# Patient Record
Sex: FEMALE | Race: BLACK OR AFRICAN AMERICAN | Employment: FULL TIME | URBAN - METROPOLITAN AREA
[De-identification: names, ages, dates, MRNs, and addresses within clinical notes are randomized per-mention and may not be internally consistent; named-entity substitution may affect disease eponyms.]

---

## 2017-02-15 ENCOUNTER — OFFICE VISIT (OUTPATIENT)
Dept: ENDOCRINOLOGY | Age: 37
End: 2017-02-15

## 2017-02-15 VITALS
HEIGHT: 60 IN | SYSTOLIC BLOOD PRESSURE: 125 MMHG | WEIGHT: 195.6 LBS | HEART RATE: 82 BPM | BODY MASS INDEX: 38.4 KG/M2 | DIASTOLIC BLOOD PRESSURE: 76 MMHG

## 2017-02-15 DIAGNOSIS — E05.90 HYPERTHYROIDISM: Primary | ICD-10-CM

## 2017-02-15 DIAGNOSIS — R00.0 TACHYCARDIA: ICD-10-CM

## 2017-02-15 RX ORDER — PROPRANOLOL HYDROCHLORIDE 20 MG/1
10 TABLET ORAL 2 TIMES DAILY
Qty: 60 TAB | Refills: 3
Start: 2017-02-15 | End: 2017-02-16 | Stop reason: SDUPTHER

## 2017-02-15 NOTE — PROGRESS NOTES
Chief Complaint   Patient presents with    Thyroid Problem     pcp and pharmacy verified   Records since last visit reviewed  History of Present Illness: Andrew Argueta is a 39 y.o. female here for follow up of hyperthyroidism. Pt was originally diagnosed with hyperthyroidism in 2010. She was started on MMI back in 2010 but she was not always able to afford the medication so she has not been on MMI consistently. Dr. Agus Shelby restarted her MMI at 20mg daily in March 2016. In February her TRAb was elevated at 21.29. At her last visit in October 2016 her TSH was 0.776 with FT4 of 0.95 and TT3 of 106 on MMI 10mg daily. She had been feeling sluggish and sleepy, so we decreased her MMI to 5mg daily. After 6 weeks her TSH was 0.192 with FT4 of 1.27 and TT3 of 128. Pt is taking the MMI 5mg daily and Propranolol 20mg BID. She is not having as much of the sluggish and sleepy feelings, she is concerned that she has gained about 7 pounds since October 2016. She denies issues of dysphagia, dysphonia or chocking. She denies CP, SOB, palpitations, tremors, diarrhea. She denies heat or cold intolerance. Pt is still having issues of dry eyes, she is using saline eye drops as needed. Mostly at night. She denies erythema or eye pain. She saw her eye doctor who gave her glasses and eye drops to help. Current Outpatient Prescriptions   Medication Sig    propranolol (INDERAL) 20 mg tablet Take 0.5 Tabs by mouth two (2) times a day.  methIMAzole (TAPAZOLE) 5 mg tablet TAKE ONE TABLET BY MOUTH ONCE DAILY    doxycycline (MONODOX) 100 mg capsule Take 100 mg by mouth two (2) times a day.  busPIRone (BUSPAR) 15 mg tablet Take 0.5 Tabs by mouth two (2) times a day. May increase to 1 tab 2 times daily in 5-7 days if needed.  albuterol (PROVENTIL HFA, VENTOLIN HFA, PROAIR HFA) 90 mcg/actuation inhaler Take 1-2 Puffs by inhalation every six (6) hours as needed for Wheezing.      No current facility-administered medications for this visit. No Known Allergies  Review of Systems:  - Cardiovascular: no chest pain  - Neurological: no tremors  - Integumentary: skin is normal    Physical Examination:  Blood pressure 125/76, pulse 82, height 5' (1.524 m), weight 195 lb 9.6 oz (88.7 kg). - General: pleasant, no distress, good eye contact, + exopthalmous  - Neck: minimal thyromegaly or thyroid bruits  - Cardiovascular: regular, normal rate, nl s1 and s2, no m/r/g   - Integumentary: skin is normal, no edema  - Neurological: reflexes 2+ at biceps, no tremors  - Psychiatric: normal mood and affect    Data Reviewed:   Component      Latest Ref Rng 10/11/2016 10/11/2016 10/11/2016           9:12 AM  9:12 AM  9:12 AM   TSH      0.450 - 4.500 uIU/mL  0.776    T4, Free      0.82 - 1.77 ng/dL   0.95   T3, total      71 - 180 ng/dL 106         Assessment/Plan:   1) Hyperthyroidism > Pt is clinically euthyroid today. He symptoms of edema have improved, she is not having as much sluggishness or fatigue and the cramping has improved. Will check TFTs today as adjust her dose as needed. Pt instructed to cut her Propranolol in half and take 10mg BID. Pt voices understanding and agreement with the plan. RTC 3 months    Patient Instructions   1) Cut your Propranolol in half, and take 1/2 pill twice daily. Follow-up Disposition:  Return in about 3 months (around 5/15/2017).     Copy sent to:  Dr. Ortiz Soni

## 2017-02-15 NOTE — MR AVS SNAPSHOT
Visit Information Date & Time Provider Department Dept. Phone Encounter #  
 2/15/2017 10:50 AM Shadia Hebert MD [facility] [phone] [id number] Upcoming Health Maintenance Date Due DTaP/Tdap/Td series (1 - Tdap) 5/8/2001 PAP AKA CERVICAL CYTOLOGY 5/8/2001 INFLUENZA AGE 9 TO ADULT 8/1/2016 Allergies as of 2/15/2017  Review Complete On: 2/15/2017 By: Shadia Hebert MD  
 No Known Allergies Current Immunizations  Never Reviewed No immunizations on file. Not reviewed this visit Vitals BP Pulse Height(growth percentile) Weight(growth percentile) BMI OB Status 125/76 82 5' (1.524 m) 195 lb 9.6 oz (88.7 kg) 38.2 kg/m2 Having regular periods Smoking Status Never Smoker Vitals History BMI and BSA Data Body Mass Index Body Surface Area  
 38.2 kg/m 2 1.94 m 2 Preferred Pharmacy Pharmacy Name Phone [facility] [address] [place] [address] [phone] Your Updated Medication List  
  
   
This list is accurate as of: 2/15/17 11:14 AM.  Always use your most recent med list.  
  
  
  
  
 albuterol 90 mcg/actuation inhaler Commonly known as:  PROVENTIL HFA, VENTOLIN HFA, PROAIR HFA Take 1-2 Puffs by inhalation every six (6) hours as needed for Wheezing. busPIRone 15 mg tablet Commonly known as:  BUSPAR Take 0.5 Tabs by mouth two (2) times a day. May increase to 1 tab 2 times daily in 5-7 days if needed. doxycycline 100 mg capsule Commonly known as:  Donnella Span Take 100 mg by mouth two (2) times a day. methIMAzole 5 mg tablet Commonly known as:  TAPAZOLE  
TAKE ONE TABLET BY MOUTH ONCE DAILY propranolol 20 mg tablet Commonly known as:  INDERAL Take 1 Tab by mouth two (2) times a day. Patient Instructions 1) Cut your Propranolol in half, and take 1/2 pill twice daily. Introducing [facility]! [facility] introduces [employer] patient portal. Now you can access parts of your medical record, email your doctor's office, and request medication refills online. 1. In your internet browser, go to [email or web address] 2. Click on the First Time User? Click Here link in the Sign In box. You will see the New Member Sign Up page. 3. Enter your [employer] Access Code exactly as it appears below. You will not need to use this code after youve completed the sign-up process. If you do not sign up before the expiration date, you must request a new code. · [employer] Access Code: [email or web address] Expires: 5/16/2017 10:50 AM 
 
4. Enter the last four digits of your Social Security Number (xxxx) and Date of Birth (mm/dd/yyyy) as indicated and click Submit. You will be taken to the next sign-up page. 5. Create a [employer] ID. This will be your [employer] login ID and cannot be changed, so think of one that is secure and easy to remember. 6. Create a [employer] password. You can change your password at any time. 7. Enter your Password Reset Question and Answer. This can be used at a later time if you forget your password. 8. Enter your e-mail address. You will receive e-mail notification when new information is available in [address] 9. Click Sign Up. You can now view and download portions of your medical record. 10. Click the Download Summary menu link to download a portable copy of your medical information. If you have questions, please visit the Frequently Asked Questions section of the [employer] website. Remember, [employer] is NOT to be used for urgent needs. For medical emergencies, dial 911. Now available from your iPhone and Android! Please provide this summary of care documentation to your next provider. Your primary care clinician is listed as [address]. If you have any questions after today's visit, please call [phone].

## 2017-02-16 DIAGNOSIS — R00.0 TACHYCARDIA: ICD-10-CM

## 2017-02-16 DIAGNOSIS — E05.90 HYPERTHYROIDISM: ICD-10-CM

## 2017-02-16 LAB
T3 SERPL-MCNC: 118 NG/DL (ref 71–180)
T4 FREE SERPL-MCNC: 1.32 NG/DL (ref 0.82–1.77)
TSH SERPL DL<=0.005 MIU/L-ACNC: 0.21 UIU/ML (ref 0.45–4.5)

## 2017-02-16 RX ORDER — PROPRANOLOL HYDROCHLORIDE 10 MG/1
10 TABLET ORAL 2 TIMES DAILY
Qty: 60 TAB | Refills: 3 | Status: SHIPPED | OUTPATIENT
Start: 2017-02-16 | End: 2018-01-18 | Stop reason: SDUPTHER

## 2017-02-16 NOTE — PROGRESS NOTES
Spoke with pt regarding her thyroid labs. Her T4/T4 are in a good range and her TSH is stable. Pt is clinically euthyroid. Will continue the MMI 5mg daily. Pt to decrease her Propranolol to 10mg BID. Pt voices understanding and agreement with the plan.

## 2017-03-19 ENCOUNTER — HOSPITAL ENCOUNTER (EMERGENCY)
Age: 37
Discharge: HOME | End: 2017-03-19
Attending: EMERGENCY MEDICINE | Admitting: EMERGENCY MEDICINE
Payer: MEDICAID

## 2017-03-19 VITALS
WEIGHT: 196 LBS | HEART RATE: 87 BPM | TEMPERATURE: 98.6 F | HEIGHT: 60 IN | DIASTOLIC BLOOD PRESSURE: 89 MMHG | SYSTOLIC BLOOD PRESSURE: 129 MMHG | OXYGEN SATURATION: 98 % | BODY MASS INDEX: 38.48 KG/M2 | RESPIRATION RATE: 16 BRPM

## 2017-03-19 DIAGNOSIS — R09.81 NASAL SINUS CONGESTION: Primary | ICD-10-CM

## 2017-03-19 PROCEDURE — 99282 EMERGENCY DEPT VISIT SF MDM: CPT

## 2017-03-19 RX ORDER — FLUTICASONE PROPIONATE 50 MCG
2 SPRAY, SUSPENSION (ML) NASAL DAILY
Qty: 1 BOTTLE | Refills: 0 | Status: SHIPPED | OUTPATIENT
Start: 2017-03-19 | End: 2019-06-25

## 2017-03-19 RX ORDER — FLUTICASONE PROPIONATE 50 MCG
2 SPRAY, SUSPENSION (ML) NASAL DAILY
Qty: 1 BOTTLE | Refills: 0 | Status: SHIPPED | OUTPATIENT
Start: 2017-03-19 | End: 2017-03-19

## 2017-03-19 RX ORDER — CETIRIZINE HYDROCHLORIDE, PSEUDOEPHEDRINE HYDROCHLORIDE 5; 120 MG/1; MG/1
1 TABLET, FILM COATED, EXTENDED RELEASE ORAL 2 TIMES DAILY
Qty: 20 TAB | Refills: 0 | Status: SHIPPED | OUTPATIENT
Start: 2017-03-19 | End: 2017-03-19

## 2017-03-19 RX ORDER — CETIRIZINE HYDROCHLORIDE, PSEUDOEPHEDRINE HYDROCHLORIDE 5; 120 MG/1; MG/1
1 TABLET, FILM COATED, EXTENDED RELEASE ORAL 2 TIMES DAILY
Qty: 20 TAB | Refills: 0 | Status: SHIPPED | OUTPATIENT
Start: 2017-03-19 | End: 2019-06-25

## 2017-03-19 NOTE — ED PROVIDER NOTES
HPI Comments: Viri Hall is a 39 y.o. female who presents home to the ED with a c/o congestion. Pt states that she has had a lot of sinus congestion for the last couple weeks. She has been taking mucinex with minimal to no relief. She states that she has been spitting up yellow mucous, has head pressure, and has a sore throat. She denies any ear ache. She specifically denies any fevers, chills, nausea, vomiting, chest pain, shortness of breath, headache, rash, diarrhea, sweating or weight loss. PCP: None  PMHx significant for: Past Medical History:  No date: Anxiety      Comment: History of panic attacks. No date: Asthma  No date: Endocrine disease      Comment: hyperthyroidism    PSHx significant for: Past Surgical History:  2008: HX  SECTION    Social Hx: Tobacco: None EtOH: Social Illicit drug use: none. She is a collection caller for work    There are no further complaints or symptoms at this time. )    Note written by Lawanda Martino, as dictated by Chaz SALMERON 9:39 AM      The history is provided by the patient. Past Medical History:   Diagnosis Date    Anxiety     History of panic attacks.  Asthma     Endocrine disease     hyperthyroidism       Past Surgical History:   Procedure Laterality Date    HX  SECTION           Family History:   Problem Relation Age of Onset    Heart Attack Mother     Diabetes Father     Diabetes Sister     Thyroid Disease Sister     No Known Problems Brother     Stroke Paternal Grandmother     Diabetes Paternal Grandmother     Thyroid Disease Paternal Aunt     Cancer Neg Hx        Social History     Social History    Marital status: SINGLE     Spouse name: N/A    Number of children: N/A    Years of education: N/A     Occupational History    Not on file.      Social History Main Topics    Smoking status: Never Smoker    Smokeless tobacco: Never Used    Alcohol use 0.0 oz/week     0 Standard drinks or equivalent per week      Comment: socially    Drug use: No    Sexual activity: Not Currently     Other Topics Concern    Not on file     Social History Narrative         ALLERGIES: Review of patient's allergies indicates no known allergies. Review of Systems   HENT: Positive for congestion, sinus pressure and sore throat. Negative for ear pain. All other systems reviewed and are negative. Vitals:    03/19/17 0933   BP: 129/89   Pulse: 87   Resp: 16   Temp: 98.6 °F (37 °C)   SpO2: 98%   Weight: 88.9 kg (196 lb)   Height: 5' (1.524 m)            Physical Exam   Constitutional: She is oriented to person, place, and time. She appears well-developed and well-nourished. No distress. HENT:   Head: Normocephalic and atraumatic. Right Ear: External ear normal.   Left Ear: External ear normal.   Erythematous boggy nares with clear rhinorrhea + PND   Neck: Neck supple. Cardiovascular: Normal rate, regular rhythm, normal heart sounds and intact distal pulses. Exam reveals no gallop and no friction rub. No murmur heard. Pulmonary/Chest: Effort normal and breath sounds normal. No stridor. No respiratory distress. She has no wheezes. She has no rales. She exhibits no tenderness. Abdominal: Soft. Bowel sounds are normal. She exhibits no distension and no mass. There is no tenderness. There is no rebound and no guarding. Musculoskeletal: Normal range of motion. She exhibits no edema, tenderness or deformity. Neurological: She is alert and oriented to person, place, and time. No cranial nerve deficit. Coordination normal.   Skin: No rash noted. No erythema. No pallor. Psychiatric: She has a normal mood and affect. Her behavior is normal.   Nursing note and vitals reviewed.        MDM  Number of Diagnoses or Management Options  Nasal sinus congestion:      Amount and/or Complexity of Data Reviewed  Review and summarize past medical records: yes    Patient Progress  Patient progress: stable    ED Course Procedures    9:45 AM  Discussed pt, sx, hx and current findings with Dr Scout De Anda. He is in agreement with plan  Domenico Adams. DANILO Downey      LABORATORY TESTS:  No results found for this or any previous visit (from the past 12 hour(s)). IMAGING RESULTS:  No orders to display       MEDICATIONS GIVEN:  Medications - No data to display    IMPRESSION:  1. Nasal sinus congestion        PLAN:  1. Discharge Medication List as of 3/19/2017  9:47 AM      START taking these medications    Details   fluticasone (FLONASE) 50 mcg/actuation nasal spray 2 Sprays by Both Nostrils route daily. , Normal, Disp-1 Bottle, R-0      cetirizine-psuedoePHEDrine (ZYRTEC-D) 5-120 mg per tablet Take 1 Tab by mouth two (2) times a day., Normal, Disp-20 Tab, R-0         CONTINUE these medications which have NOT CHANGED    Details   propranolol (INDERAL) 10 mg tablet Take 1 Tab by mouth two (2) times a day., Normal, Disp-60 Tab, R-3      methIMAzole (TAPAZOLE) 5 mg tablet TAKE ONE TABLET BY MOUTH ONCE DAILY, Normal, Disp-30 Tab, R-3      busPIRone (BUSPAR) 15 mg tablet Take 0.5 Tabs by mouth two (2) times a day.  May increase to 1 tab 2 times daily in 5-7 days if needed., Normal, Disp-60 Tab, R-1      albuterol (PROVENTIL HFA, VENTOLIN HFA, PROAIR HFA) 90 mcg/actuation inhaler Take 1-2 Puffs by inhalation every six (6) hours as needed for Wheezing., Print, Disp-1 Inhaler, R-0         STOP taking these medications       doxycycline (MONODOX) 100 mg capsule Comments:   Reason for Stoppin.   Follow-up Information     Follow up With Details Comments [address], [place] Schedule an appointment as soon as possible for a visit 2-4 days for recheck [address] and [facility] [address]  [address] Schedule an appointment as soon as possible for a visit 2-4 days for recheck [phone] Return to ED if worse     9:45 AM  Pt has been reexamined. Pt has no new complaints, changes or physical findings. Care plan outlined and precautions discussed. All available results were reviewed with pt. All medications were reviewed with pt. All of pt's questions and concerns were addressed. Pt agrees to F/U as instructed and agrees to return to ED upon further deterioration. Pt is ready to go home.   Tulio Larsen PA-C

## 2017-03-19 NOTE — DISCHARGE INSTRUCTIONS
Sinusitis: Care Instructions  Your Care Instructions    Sinusitis is an infection of the lining of the sinus cavities in your head. Sinusitis often follows a cold. It causes pain and pressure in your head and face. In most cases, sinusitis gets better on its own in 1 to 2 weeks. But some mild symptoms may last for several weeks. Sometimes antibiotics are needed. Follow-up care is a key part of your treatment and safety. Be sure to make and go to all appointments, and call your doctor if you are having problems. It's also a good idea to know your test results and keep a list of the medicines you take. How can you care for yourself at home? · Take an over-the-counter pain medicine, such as acetaminophen (Tylenol), ibuprofen (Advil, Motrin), or naproxen (Aleve). Read and follow all instructions on the label. · If the doctor prescribed antibiotics, take them as directed. Do not stop taking them just because you feel better. You need to take the full course of antibiotics. · Be careful when taking over-the-counter cold or flu medicines and Tylenol at the same time. Many of these medicines have acetaminophen, which is Tylenol. Read the labels to make sure that you are not taking more than the recommended dose. Too much acetaminophen (Tylenol) can be harmful. · Breathe warm, moist air from a steamy shower, a hot bath, or a sink filled with hot water. Avoid cold, dry air. Using a humidifier in your home may help. Follow the directions for cleaning the machine. · Use saline (saltwater) nasal washes to help keep your nasal passages open and wash out mucus and bacteria. You can buy saline nose drops at a grocery store or drugstore. Or you can make your own at home by adding 1 teaspoon of salt and 1 teaspoon of baking soda to 2 cups of distilled water. If you make your own, fill a bulb syringe with the solution, insert the tip into your nostril, and squeeze gently. Zabrina Jennings your nose.   · Put a hot, wet towel or a warm gel pack on your face 3 or 4 times a day for 5 to 10 minutes each time. · Try a decongestant nasal spray like oxymetazoline (Afrin). Do not use it for more than 3 days in a row. Using it for more than 3 days can make your congestion worse. When should you call for help? Call your doctor now or seek immediate medical care if:  · You have new or worse swelling or redness in your face or around your eyes. · You have a new or higher fever. Watch closely for changes in your health, and be sure to contact your doctor if:  · You have new or worse facial pain. · The mucus from your nose becomes thicker (like pus) or has new blood in it. · You are not getting better as expected. Where can you learn more? Go to http://darek-esther.info/. Enter Z791 in the search box to learn more about \"Sinusitis: Care Instructions. \"  Current as of: July 29, 2016  Content Version: 11.1  © 4855-2149 [employer]. Care instructions adapted under license by [employer] (which disclaims liability or warranty for this information). If you have questions about a medical condition or this instruction, always ask your healthcare professional. [place] [address] any warranty or liability for your use of this information. Saline Nasal Washes: Care Instructions  Your Care Instructions  Saline nasal washes help keep the nasal passages open by washing out thick or dried mucus. This simple remedy can help relieve symptoms of allergies, sinusitis, and colds. It also can make the nose feel more comfortable by keeping the mucous membranes moist. You may notice a little burning sensation in your nose the first few times you use the solution, but this usually gets better in a few days. Follow-up care is a key part of your treatment and safety. Be sure to make and go to all appointments, and call your doctor if you are having problems.  It's also a good idea to know your test results and keep a list of the medicines you take. How can you care for yourself at home? · You can buy premixed saline solution in a squeeze bottle or other sinus rinse products at a drugstore. Read and follow the instructions on the label. · You also can make your own saline solution by adding 1 teaspoon of salt and 1 teaspoon of baking soda to 2 cups of distilled water. · If you use a homemade solution, pour a small amount into a clean bowl. Using a rubber bulb syringe, squeeze the syringe and place the tip in the salt water. Pull a small amount of the salt water into the syringe by relaxing your hand. · Sit down with your head tilted slightly back. Do not lie down. Put the tip of the bulb syringe or the squeeze bottle a little way into one of your nostrils. Gently drip or squirt a few drops into the nostril. Repeat with the other nostril. Some sneezing and gagging are normal at first.  · Gently blow your nose. · Wipe the syringe or bottle tip clean after each use. · Repeat this 2 or 3 times a day. · Use nasal washes gently if you have nosebleeds often. When should you call for help? Watch closely for changes in your health, and be sure to contact your doctor if:  · You often get nosebleeds. · You have problems doing the nasal washes. Where can you learn more? Go to http://darek-esther.info/. Enter 441 [id number] in the search box to learn more about \"Saline Nasal Washes: Care Instructions. \"  Current as of: July 29, 2016  Content Version: 11.1  © 6696-5955 [employer]. Care instructions adapted under license by [employer] (which disclaims liability or warranty for this information). If you have questions about a medical condition or this instruction, always ask your healthcare professional. [address] any warranty or liability for your use of this information. We hope that we have addressed all of your medical concerns.  The examination and treatment you received in the Emergency Department were for an emergent problem and were not intended as complete care. It is important that you follow up with your healthcare provider(s) for ongoing care. If your symptoms worsen or do not improve as expected, and you are unable to reach your usual health care provider(s), you should return to the Emergency Department. Today's healthcare is undergoing tremendous change, and patient satisfaction surveys are one of the many tools to assess the quality of medical care. You may receive a survey from the [employer] regarding your experience in the Emergency Department. I hope that your experience has been completely positive, particularly the medical care that I provided. As such, please participate in the survey; anything less than excellent does not meet my expectations or intentions. [address] [facility] and [address] [facility] participate in nationally recognized quality of care measures. If your blood pressure is greater than 120/80, as reported below, we urge that you seek medical care to address the potential of high blood pressure, commonly known as hypertension. Hypertension can be hereditary or can be caused by certain medical conditions, pain, stress, or \"white coat syndrome. \"       Please make an appointment with your health care provider(s) for follow up of your Emergency Department visit. VITALS:   Patient Vitals for the past 8 hrs:   Temp Pulse Resp BP SpO2   03/19/17 0933 98.6 °F (37 °C) 87 16 129/89 98 %          Thank you for allowing us to provide you with medical care today. We realize that you have many choices for your emergency care needs. Please choose us in the future for any continued health care needs. [employer]  [place], [address] [facility] [address]   Office: [phone]

## 2017-03-19 NOTE — ED TRIAGE NOTES
Pt reports nasal congestion and sinus pain for the past 2 weeks. Pt ha been taking Mucinex OTC with minimal relief.

## 2017-05-22 ENCOUNTER — OFFICE VISIT (OUTPATIENT)
Dept: ENDOCRINOLOGY | Age: 37
End: 2017-05-22

## 2017-05-22 VITALS
SYSTOLIC BLOOD PRESSURE: 118 MMHG | HEIGHT: 60 IN | BODY MASS INDEX: 38.56 KG/M2 | HEART RATE: 95 BPM | DIASTOLIC BLOOD PRESSURE: 79 MMHG | WEIGHT: 196.4 LBS

## 2017-05-22 DIAGNOSIS — E05.90 HYPERTHYROIDISM: Primary | ICD-10-CM

## 2017-05-22 NOTE — PATIENT INSTRUCTIONS
Radioactive Iodine: What to Expect at Home  Your Recovery  Radioactive iodine is absorbed and concentrated by the thyroid gland. You get it in liquid or pill form. The radiation will pass out of your body through your urine within days. Until that time, you will give off radiation in your sweat, your saliva, your urine, and anything else that comes out of your body. It is important to avoid exposing other people to the radioactivity from your body. Your doctor will give you more written instructions. Follow these carefully. The instructions will tell you how far to stay away from people and how long you need to follow the precautions listed below. They will list other ways to keep other people safe. They will also tell you when it will be safe to go out, go to work, and do other activities. How can you care for yourself at home? General recommendations  · For a period of time, you will need to keep your distance from other people, especially young children and pregnant women. · Avoid close contact, kissing, and sexual activity. You may need to sleep in a separate bed from your partner. · Keep the toilet very clean. Men should urinate sitting down to avoid splashing. Flush the toilet 2 or 3 times after each use. Wash your hands well with soap and lots of water each time you use the toilet. · Rinse the bathroom sink and tub well after you use them. · Use separate towels, washcloths, and sheets. Wash these and your personal clothing by themselves. Don't wash them with other people's laundry. · You may want to use a special plastic trash bag for all your trash, such as bandages, paper or plastic dishes, menstrual pads, tissues, or paper towels. Talk to your treatment facility to see if they will handle the disposal. Or after 80 days, this bag can be thrown out with your other trash. · Wash your dishes in a  or by hand.  If you use disposable dishes, they must be thrown away in the special plastic trash bag. · Don't cook for other people. If you must cook, use plastic gloves. Then throw them away in the special plastic trash bag. Don't share cups, dishes, or utensils. Pregnancy and children  · Your doctor will tell you when it is safe to have sex and become pregnant. · You should not breastfeed your baby after you have been treated with radioactive iodine. Ask your doctor when it's safe to breastfeed. Travel  · Don't take public transportation. If you are able, it's best to drive yourself. · It is important to prepare for any problems you may have at airport security. People who have had radioactive iodine treatment can set off the radiation detection machines in airports for a week to 10 days. Check with local authorities about any steps or permission you may need to travel. · If you plan to travel on the interstate, you may set off radiation detectors. Most police and transportation workers are aware of medical radiation, but it may help to carry some paperwork from your doctor. Follow-up care is a key part of your treatment and safety. Be sure to make and go to all appointments, and call your doctor if you are having problems. It's also a good idea to know your test results and keep a list of the medicines you take. When should you call for help? Watch closely for any changes in your health, and be sure to contact your doctor if:  · You have a sore throat. · You vomit. · You have diarrhea. Where can you learn more? Go to http://darek-esther.info/. Enter C257 in the search box to learn more about \"Radioactive Iodine: What to Expect at Home. \"  Current as of: July 28, 2016  Content Version: 11.2  © 4009-2878 [employer]. Care instructions adapted under license by [employer] (which disclaims liability or warranty for this information).  If you have questions about a medical condition or this instruction, always ask your healthcare professional. [facility] disclaims any warranty or liability for your use of this information. Thyroidectomy: Before Your Surgery  What is a thyroidectomy? A thyroidectomy is surgery to take out your thyroid gland. This gland is shaped like a butterfly. It lies across the windpipe (trachea). The gland makes hormones that control how your body makes and uses energy (metabolism). A doctor removes the gland when it gets too big, does not work right, or has a tumor. Most tumors that grow in this gland are benign. This means they are not cancer. The doctor will take out the thyroid through a cut (incision) in the front of your neck. You will likely have a tube, called a drain, in your neck to let fluid out of the cut. The drain is most often taken out before you go home. You may go home on the same day. Or you may stay one or more nights in the hospital. You may return to work or your normal routine in 1 to 2 weeks. This depends on whether you need more treatment and how you feel. It may also depend on the kind of work you do. Your doctor will check your incision in about a week. You may need to take thyroid medicine. If you have thyroid cancer, you may need to have radioactive iodine therapy. Your doctor will talk to you about what happens next. Follow-up care is a key part of your treatment and safety. Be sure to make and go to all appointments, and call your doctor if you are having problems. It's also a good idea to know your test results and keep a list of the medicines you take. What happens before surgery? Surgery can be stressful. This information will help you understand what you can expect. And it will help you safely prepare for surgery. Preparing for surgery  · Understand exactly what surgery is planned, along with the risks, benefits, and other options. · Tell your doctors ALL the medicines, vitamins, supplements, and herbal remedies you take.  Some of these can increase the risk of bleeding or interact with anesthesia. · If you take blood thinners, such as warfarin (Coumadin), clopidogrel (Plavix), or aspirin, be sure to talk to your doctor. He or she will tell you if you should stop taking these medicines before your surgery. Make sure that you understand exactly what your doctor wants you to do. · Your doctor will tell you which medicines to take or stop before your surgery. You may need to stop taking certain medicines a week or more before surgery. So talk to your doctor as soon as you can. · If you have an advance directive, let your doctor know. It may include a living will and a durable power of  for health care. Bring a copy to the hospital. If you don't have one, you may want to prepare one. It lets your doctor and loved ones know your health care wishes. Doctors suggest that everyone prepare these papers before any type of surgery or procedure. What happens on the day of surgery? · Follow the instructions exactly about when to stop eating and drinking. If you don't, your surgery may be canceled. If your doctor told you to take your medicines on the day of surgery, take them with only a sip of water. · Take a bath or shower before you come in for your surgery. Do not apply lotions, perfumes, deodorants, or nail polish. · Do not shave the surgical site yourself. · Take off all jewelry and piercings. And take out contact lenses, if you wear them. At the hospital or surgery center  · Bring a picture ID. · The area for surgery is often marked to make sure there are no errors. · You will be kept comfortable and safe by your anesthesia provider. You will be asleep during the surgery. · How long your surgery takes depends on how complex it is. Going home  · Be sure you have someone to drive you home. Anesthesia and pain medicine make it unsafe for you to drive. · You will be given more specific instructions about recovering from your surgery.  They will cover things like diet, wound care, follow-up care, driving, and getting back to your normal routine. When should you call your doctor? · You have questions or concerns. · You don't understand how to prepare for your surgery. · You become ill before the surgery (such as fever, flu, or a cold). · You need to reschedule or have changed your mind about having the surgery. Where can you learn more? Go to http://darek-esther.info/. Enter H883 in the search box to learn more about \"Thyroidectomy: Before Your Surgery. \"  Current as of: July 28, 2016  Content Version: 11.2  © 1702-2539 [employer]. Care instructions adapted under license by [employer] (which disclaims liability or warranty for this information). If you have questions about a medical condition or this instruction, always ask your healthcare professional. [address] any warranty or liability for your use of this information.

## 2017-05-22 NOTE — PROGRESS NOTES
Chief Complaint   Patient presents with    Thyroid Problem     pcp and pharmacy verified   Records since last visit reviewed  History of Present Illness: Dari Ferreira is a 40 y.o. female here for follow up of hyperthyroidism. Pt was originally diagnosed with hyperthyroidism in 2010. She was started on MMI back in 2010 but she was not always able to afford the medication so she has not been on MMI consistently. Dr. Mary Arriaga restarted her MMI at 20mg daily in March 2016. In February her TRAb was elevated at 21.29. At her last visit in February 2017 her TSH was 0.207 with FT4 of 1.32 and TT3 of 118 on MMI 5mg daily and Propranolol 20mg BID. I instructed her to decrease her Propranolol to 10mg daily and continue the MMI 5mg daily. Pt notes she had her wisdom teeth surgically removed in April and just recently returned to work. Pt is taking the MMI 5mg daily and Propranolol 10mg daily. Pt notes she is feeling stressed out and \"overwhelmed\" because of stress at work and school. She weight is stable since February 2017. She denies issues of dysphagia, dysphonia or chocking. She denies CP, SOB, palpitations, tremors, diarrhea. She denies heat or cold intolerance. Pt is still having issues of dry eyes, she is using saline eye drops as needed. Mostly at night. She denies erythema or eye pain. She saw her eye doctor in March 2017, they told her she was near sighted and gave her glasses. He also gave her eye drops to help with the dry eyes, which have helped. Current Outpatient Prescriptions   Medication Sig    fluticasone (FLONASE) 50 mcg/actuation nasal spray 2 Sprays by Both Nostrils route daily.  cetirizine-psuedoePHEDrine (ZYRTEC-D) 5-120 mg per tablet Take 1 Tab by mouth two (2) times a day.  propranolol (INDERAL) 10 mg tablet Take 1 Tab by mouth two (2) times a day.     methIMAzole (TAPAZOLE) 5 mg tablet TAKE ONE TABLET BY MOUTH ONCE DAILY    busPIRone (BUSPAR) 15 mg tablet Take 0.5 Tabs by mouth two (2) times a day. May increase to 1 tab 2 times daily in 5-7 days if needed.  albuterol (PROVENTIL HFA, VENTOLIN HFA, PROAIR HFA) 90 mcg/actuation inhaler Take 1-2 Puffs by inhalation every six (6) hours as needed for Wheezing. No current facility-administered medications for this visit. No Known Allergies  Review of Systems:  - Cardiovascular: no chest pain  - Neurological: no tremors  - Integumentary: skin is normal    Physical Examination:  Blood pressure 118/79, pulse 95, height 5' (1.524 m), weight 196 lb 6.4 oz (89.1 kg). - General: pleasant, no distress, good eye contact, + exopthalmous  - Neck: minimal thyromegaly or thyroid bruits  - Cardiovascular: regular, normal rate, nl s1 and s2, no m/r/g   - Integumentary: skin is normal, no edema  - Neurological: reflexes 2+ at biceps, no tremors  - Psychiatric: normal mood and affect    Data Reviewed:   -none    Assessment/Plan:   1) Hyperthyroidism > Pt is clinically euthyroid today on MMI 5mg daily. Will check TFTs today and adjust her dose as able. We discussed at length [facility] and thyroidectomy, the pros and cons vs long term MMI. Pt voices understanding and agreement with the plan. RTC 3 months    Patient Instructions        Radioactive Iodine: What to Expect at Home  Your Recovery  Radioactive iodine is absorbed and concentrated by the thyroid gland. You get it in liquid or pill form. The radiation will pass out of your body through your urine within days. Until that time, you will give off radiation in your sweat, your saliva, your urine, and anything else that comes out of your body. It is important to avoid exposing other people to the radioactivity from your body. Your doctor will give you more written instructions. Follow these carefully. The instructions will tell you how far to stay away from people and how long you need to follow the precautions listed below.  They will list other ways to keep other people safe. They will also tell you when it will be safe to go out, go to work, and do other activities. How can you care for yourself at home? General recommendations  · For a period of time, you will need to keep your distance from other people, especially young children and pregnant women. · Avoid close contact, kissing, and sexual activity. You may need to sleep in a separate bed from your partner. · Keep the toilet very clean. Men should urinate sitting down to avoid splashing. Flush the toilet 2 or 3 times after each use. Wash your hands well with soap and lots of water each time you use the toilet. · Rinse the bathroom sink and tub well after you use them. · Use separate towels, washcloths, and sheets. Wash these and your personal clothing by themselves. Don't wash them with other people's laundry. · You may want to use a special plastic trash bag for all your trash, such as bandages, paper or plastic dishes, menstrual pads, tissues, or paper towels. Talk to your treatment facility to see if they will handle the disposal. Or after 80 days, this bag can be thrown out with your other trash. · Wash your dishes in a  or by hand. If you use disposable dishes, they must be thrown away in the special plastic trash bag. · Don't cook for other people. If you must cook, use plastic gloves. Then throw them away in the special plastic trash bag. Don't share cups, dishes, or utensils. Pregnancy and children  · Your doctor will tell you when it is safe to have sex and become pregnant. · You should not breastfeed your baby after you have been treated with radioactive iodine. Ask your doctor when it's safe to breastfeed. Travel  · Don't take public transportation. If you are able, it's best to drive yourself. · It is important to prepare for any problems you may have at airport security.  People who have had radioactive iodine treatment can set off the radiation detection machines in airports for a week to 10 days. Check with local authorities about any steps or permission you may need to travel. · If you plan to travel on the interstate, you may set off radiation detectors. Most police and transportation workers are aware of medical radiation, but it may help to carry some paperwork from your doctor. Follow-up care is a key part of your treatment and safety. Be sure to make and go to all appointments, and call your doctor if you are having problems. It's also a good idea to know your test results and keep a list of the medicines you take. When should you call for help? Watch closely for any changes in your health, and be sure to contact your doctor if:  · You have a sore throat. · You vomit. · You have diarrhea. Where can you learn more? Go to http://darek-esther.info/. Enter M581 in the search box to learn more about \"Radioactive Iodine: What to Expect at Home. \"  Current as of: July 28, 2016  Content Version: 11.2  © 3314-0069 [employer]. Care instructions adapted under license by [employer] (which disclaims liability or warranty for this information). If you have questions about a medical condition or this instruction, always ask your healthcare professional. [place] [address] any warranty or liability for your use of this information. Thyroidectomy: Before Your Surgery  What is a thyroidectomy? A thyroidectomy is surgery to take out your thyroid gland. This gland is shaped like a butterfly. It lies across the windpipe (trachea). The gland makes hormones that control how your body makes and uses energy (metabolism). A doctor removes the gland when it gets too big, does not work right, or has a tumor. Most tumors that grow in this gland are benign. This means they are not cancer. The doctor will take out the thyroid through a cut (incision) in the front of your neck.  You will likely have a tube, called a drain, in your neck to let fluid out of the cut. The drain is most often taken out before you go home. You may go home on the same day. Or you may stay one or more nights in the hospital. You may return to work or your normal routine in 1 to 2 weeks. This depends on whether you need more treatment and how you feel. It may also depend on the kind of work you do. Your doctor will check your incision in about a week. You may need to take thyroid medicine. If you have thyroid cancer, you may need to have radioactive iodine therapy. Your doctor will talk to you about what happens next. Follow-up care is a key part of your treatment and safety. Be sure to make and go to all appointments, and call your doctor if you are having problems. It's also a good idea to know your test results and keep a list of the medicines you take. What happens before surgery? Surgery can be stressful. This information will help you understand what you can expect. And it will help you safely prepare for surgery. Preparing for surgery  · Understand exactly what surgery is planned, along with the risks, benefits, and other options. · Tell your doctors ALL the medicines, vitamins, supplements, and herbal remedies you take. Some of these can increase the risk of bleeding or interact with anesthesia. · If you take blood thinners, such as warfarin (Coumadin), clopidogrel (Plavix), or aspirin, be sure to talk to your doctor. He or she will tell you if you should stop taking these medicines before your surgery. Make sure that you understand exactly what your doctor wants you to do. · Your doctor will tell you which medicines to take or stop before your surgery. You may need to stop taking certain medicines a week or more before surgery. So talk to your doctor as soon as you can. · If you have an advance directive, let your doctor know. It may include a living will and a durable power of  for health care.  Bring a copy to the hospital. If you don't have one, you may want to prepare one. It lets your doctor and loved ones know your health care wishes. Doctors suggest that everyone prepare these papers before any type of surgery or procedure. What happens on the day of surgery? · Follow the instructions exactly about when to stop eating and drinking. If you don't, your surgery may be canceled. If your doctor told you to take your medicines on the day of surgery, take them with only a sip of water. · Take a bath or shower before you come in for your surgery. Do not apply lotions, perfumes, deodorants, or nail polish. · Do not shave the surgical site yourself. · Take off all jewelry and piercings. And take out contact lenses, if you wear them. At the hospital or surgery center  · Bring a picture ID. · The area for surgery is often marked to make sure there are no errors. · You will be kept comfortable and safe by your anesthesia provider. You will be asleep during the surgery. · How long your surgery takes depends on how complex it is. Going home  · Be sure you have someone to drive you home. Anesthesia and pain medicine make it unsafe for you to drive. · You will be given more specific instructions about recovering from your surgery. They will cover things like diet, wound care, follow-up care, driving, and getting back to your normal routine. When should you call your doctor? · You have questions or concerns. · You don't understand how to prepare for your surgery. · You become ill before the surgery (such as fever, flu, or a cold). · You need to reschedule or have changed your mind about having the surgery. Where can you learn more? Go to http://darek-esther.info/. Enter A345 in the search box to learn more about \"Thyroidectomy: Before Your Surgery. \"  Current as of: July 28, 2016  Content Version: 11.2  © 6342-6529 [employer].  Care instructions adapted under license by [employer] (which disclaims liability or warranty for this information). If you have questions about a medical condition or this instruction, always ask your healthcare professional. [place] [address] any warranty or liability for your use of this information. Follow-up Disposition:  Return in about 3 months (around 8/22/2017).     Copy sent to:  Dr. Daquan Real

## 2017-05-22 NOTE — MR AVS SNAPSHOT
Visit Information Date & Time Provider Department Dept. Phone Encounter #  
 5/22/2017 11:30 AM Annetta Hanna, [address] [place] [address] Diabetes and Endocrinology [phone] [id number] Upcoming Health Maintenance Date Due DTaP/Tdap/Td series (1 - Tdap) 5/8/2001 PAP AKA CERVICAL CYTOLOGY 5/8/2001 INFLUENZA AGE 9 TO ADULT 8/1/2017 Allergies as of 5/22/2017  Review Complete On: 5/22/2017 By: Annetta Hanna MD  
 No Known Allergies Current Immunizations  Never Reviewed No immunizations on file. Not reviewed this visit Vitals BP Pulse Height(growth percentile) Weight(growth percentile) BMI OB Status 118/79 95 5' (1.524 m) 196 lb 6.4 oz (89.1 kg) 38.36 kg/m2 Having regular periods Smoking Status Never Smoker Vitals History BMI and BSA Data Body Mass Index Body Surface Area  
 38.36 kg/m 2 1.94 m 2 Preferred Pharmacy Pharmacy Name Phone [facility] [address] [place] [address] [phone] Your Updated Medication List  
  
   
This list is accurate as of: 5/22/17 12:01 PM.  Always use your most recent med list.  
  
  
  
  
 albuterol 90 mcg/actuation inhaler Commonly known as:  PROVENTIL HFA, VENTOLIN HFA, PROAIR HFA Take 1-2 Puffs by inhalation every six (6) hours as needed for Wheezing. busPIRone 15 mg tablet Commonly known as:  BUSPAR Take 0.5 Tabs by mouth two (2) times a day. May increase to 1 tab 2 times daily in 5-7 days if needed. cetirizine-psuedoePHEDrine 5-120 mg per tablet Commonly known as:  ZyrTEC-D Take 1 Tab by mouth two (2) times a day. fluticasone 50 mcg/actuation nasal spray Commonly known as:  Latrice Aid 2 Sprays by Both Nostrils route daily. methIMAzole 5 mg tablet Commonly known as:  TAPAZOLE  
TAKE ONE TABLET BY MOUTH ONCE DAILY propranolol 10 mg tablet Commonly known as:  INDERAL Take 1 Tab by mouth two (2) times a day. Patient Instructions Radioactive Iodine: What to Expect at [facility] Your Recovery Radioactive iodine is absorbed and concentrated by the thyroid gland. You get it in liquid or pill form. The radiation will pass out of your body through your urine within days. Until that time, you will give off radiation in your sweat, your saliva, your urine, and anything else that comes out of your body. It is important to avoid exposing other people to the radioactivity from your body. Your doctor will give you more written instructions. Follow these carefully. The instructions will tell you how far to stay away from people and how long you need to follow the precautions listed below. They will list other ways to keep other people safe. They will also tell you when it will be safe to go out, go to work, and do other activities. How can you care for yourself at home? General recommendations · For a period of time, you will need to keep your distance from other people, especially young children and pregnant women. · Avoid close contact, kissing, and sexual activity. You may need to sleep in a separate bed from your partner. · Keep the toilet very clean. Men should urinate sitting down to avoid splashing. Flush the toilet 2 or 3 times after each use. Wash your hands well with soap and lots of water each time you use the toilet. · Rinse the bathroom sink and tub well after you use them. · Use separate towels, washcloths, and sheets. Wash these and your personal clothing by themselves. Don't wash them with other people's laundry. · You may want to use a special plastic trash bag for all your trash, such as bandages, paper or plastic dishes, menstrual pads, tissues, or paper towels. Talk to your treatment facility to see if they will handle the disposal. Or after 80 days, this bag can be thrown out with your other trash. · Wash your dishes in a  or by hand.  If you use disposable dishes, they must be thrown away in the special plastic trash bag. · Don't cook for other people. If you must cook, use plastic gloves. Then throw them away in the special plastic trash bag. Don't share cups, dishes, or utensils. Pregnancy and children · Your doctor will tell you when it is safe to have sex and become pregnant. · You should not breastfeed your baby after you have been treated with radioactive iodine. Ask your doctor when it's safe to breastfeed. Travel · Don't take public transportation. If you are able, it's best to drive yourself. · It is important to prepare for any problems you may have at airport security. People who have had radioactive iodine treatment can set off the radiation detection machines in airports for a week to 10 days. Check with local authorities about any steps or permission you may need to travel. · If you plan to travel on the interstate, you may set off radiation detectors. Most police and transportation workers are aware of medical radiation, but it may help to carry some paperwork from your doctor. Follow-up care is a key part of your treatment and safety. Be sure to make and go to all appointments, and call your doctor if you are having problems. It's also a good idea to know your test results and keep a list of the medicines you take. When should you call for help? Watch closely for any changes in your health, and be sure to contact your doctor if: 
· You have a sore throat. · You vomit. · You have diarrhea. Where can you learn more? Go to http://darek-esther.info/. Enter C324 in the search box to learn more about \"Radioactive Iodine: What to Expect at Home. \" Current as of: July 28, 2016 Content Version: 11.2 © 5194-9870 [employer], [place]un[place]. Care instructions adapted under license by [employer] (which disclaims liability or warranty for this information).  If you have questions about a medical condition or this instruction, always ask your healthcare professional. [address] any warranty or liability for your use of this information. Thyroidectomy: Before Your Surgery What is a thyroidectomy? A thyroidectomy is surgery to take out your thyroid gland. This gland is shaped like a butterfly. It lies across the windpipe (trachea). The gland makes hormones that control how your body makes and uses energy (metabolism). A doctor removes the gland when it gets too big, does not work right, or has a tumor. Most tumors that grow in this gland are benign. This means they are not cancer. The doctor will take out the thyroid through a cut (incision) in the front of your neck. You will likely have a tube, called a drain, in your neck to let fluid out of the cut. The drain is most often taken out before you go home. You may go home on the same day. Or you may stay one or more nights in the hospital. You may return to work or your normal routine in 1 to 2 weeks. This depends on whether you need more treatment and how you feel. It may also depend on the kind of work you do. Your doctor will check your incision in about a week. You may need to take thyroid medicine. If you have thyroid cancer, you may need to have radioactive iodine therapy. Your doctor will talk to you about what happens next. Follow-up care is a key part of your treatment and safety. Be sure to make and go to all appointments, and call your doctor if you are having problems. It's also a good idea to know your test results and keep a list of the medicines you take. What happens before surgery? Surgery can be stressful. This information will help you understand what you can expect. And it will help you safely prepare for surgery. Preparing for surgery · Understand exactly what surgery is planned, along with the risks, benefits, and other options.  
· Tell your doctors ALL the medicines, vitamins, supplements, and herbal remedies you take. Some of these can increase the risk of bleeding or interact with anesthesia. · If you take blood thinners, such as warfarin (Coumadin), clopidogrel (Plavix), or aspirin, be sure to talk to your doctor. He or she will tell you if you should stop taking these medicines before your surgery. Make sure that you understand exactly what your doctor wants you to do. · Your doctor will tell you which medicines to take or stop before your surgery. You may need to stop taking certain medicines a week or more before surgery. So talk to your doctor as soon as you can. · If you have an advance directive, let your doctor know. It may include a living will and a durable power of  for health care. Bring a copy to the hospital. If you don't have one, you may want to prepare one. It lets your doctor and loved ones know your health care wishes. Doctors suggest that everyone prepare these papers before any type of surgery or procedure. What happens on the day of surgery? · Follow the instructions exactly about when to stop eating and drinking. If you don't, your surgery may be canceled. If your doctor told you to take your medicines on the day of surgery, take them with only a sip of water. · Take a bath or shower before you come in for your surgery. Do not apply lotions, perfumes, deodorants, or nail polish. · Do not shave the surgical site yourself. · Take off all jewelry and piercings. And take out contact lenses, if you wear them. At the hospital or surgery center · Bring a picture ID. · The area for surgery is often marked to make sure there are no errors. · You will be kept comfortable and safe by your anesthesia provider. You will be asleep during the surgery. · How long your surgery takes depends on how complex it is. Going home · Be sure you have someone to drive you home. Anesthesia and pain medicine make it unsafe for you to drive. · You will be given more specific instructions about recovering from your surgery. They will cover things like diet, wound care, follow-up care, driving, and getting back to your normal routine. When should you call your doctor? · You have questions or concerns. · You don't understand how to prepare for your surgery. · You become ill before the surgery (such as fever, flu, or a cold). · You need to reschedule or have changed your mind about having the surgery. Where can you learn more? Go to http://darek-esther.info/. Enter G685 in the search box to learn more about \"Thyroidectomy: Before Your Surgery. \" Current as of: July 28, 2016 Content Version: 11.2 © 3229-5480 [employer]. Care instructions adapted under license by [employer] (which disclaims liability or warranty for this information). If you have questions about a medical condition or this instruction, always ask your healthcare professional. [place] [address] any warranty or liability for your use of this information. Introducing [facility]! [employer] introduces [employer] patient portal. Now you can access parts of your medical record, email your doctor's office, and request medication refills online. 1. In your internet browser, go to [email or web address] 2. Click on the First Time User? Click Here link in the Sign In box. You will see the New Member Sign Up page. 3. Enter your [employer] Access Code exactly as it appears below. You will not need to use this code after youve completed the sign-up process. If you do not sign up before the expiration date, you must request a new code. · [employer] Access Code: [email or web address] Expires: 8/20/2017 12:01 PM 
 
4. Enter the last four digits of your Social Security Number (xxxx) and Date of Birth (mm/dd/yyyy) as indicated and click Submit. You will be taken to the next sign-up page. 5. Create a [employer] ID. This will be your [employer] login ID and cannot be changed, so think of one that is secure and easy to remember. 6. Create a [employer] password. You can change your password at any time. 7. Enter your Password Reset Question and Answer. This can be used at a later time if you forget your password. 8. Enter your e-mail address. You will receive e-mail notification when new information is available in [address]. 9. Click Sign Up. You can now view and download portions of your medical record. 10. Click the Download Summary menu link to download a portable copy of your medical information. If you have questions, please visit the Frequently Asked Questions section of the [employer] website. Remember, [employer] is NOT to be used for urgent needs. For medical emergencies, dial 911. Now available from your iPhone and Android! Please provide this summary of care documentation to your next provider. Your primary care clinician is listed as [address] [facility]. If you have any questions after today's visit, please call [phone].

## 2017-05-23 DIAGNOSIS — E05.90 HYPERTHYROIDISM: Primary | ICD-10-CM

## 2017-05-23 LAB
T3 SERPL-MCNC: 114 NG/DL (ref 71–180)
T4 FREE SERPL-MCNC: 1.28 NG/DL (ref 0.82–1.77)
TSH SERPL DL<=0.005 MIU/L-ACNC: 0.49 UIU/ML (ref 0.45–4.5)

## 2017-05-23 NOTE — PROGRESS NOTES
Her Thyroid labs are looking good. Decrease her Methimazole to 2.5mg (1/2 of a 5mg tablet) every day. She will need repeat thyroid labs (TSH, FT4 TT3) in 6 weeks to ensure she does not become hyperthyroid again, on the lower dose of the Methimazole.

## 2017-07-07 ENCOUNTER — LAB ONLY (OUTPATIENT)
Dept: ENDOCRINOLOGY | Age: 37
End: 2017-07-07

## 2017-07-07 DIAGNOSIS — E05.90 HYPERTHYROIDISM: Primary | ICD-10-CM

## 2017-07-08 LAB
T3 SERPL-MCNC: 147 NG/DL (ref 71–180)
T4 FREE SERPL-MCNC: 1.63 NG/DL (ref 0.82–1.77)
TSH SERPL DL<=0.005 MIU/L-ACNC: 0.06 UIU/ML (ref 0.45–4.5)

## 2017-07-10 ENCOUNTER — TELEPHONE (OUTPATIENT)
Dept: ENDOCRINOLOGY | Age: 37
End: 2017-07-10

## 2017-07-10 NOTE — PROGRESS NOTES
Spoke with pt regarding her labs. She is trending back to hyperthyroid on the MMI 5mg every other day. Pt to restart taking her MMI 5mg every day. Pt voices understanding and agreement with the plan.

## 2017-11-15 DIAGNOSIS — E05.90 HYPERTHYROIDISM: ICD-10-CM

## 2017-11-16 RX ORDER — METHIMAZOLE 5 MG/1
TABLET ORAL
Qty: 30 TAB | Refills: 3 | Status: SHIPPED | OUTPATIENT
Start: 2017-11-16 | End: 2018-01-18 | Stop reason: SDUPTHER

## 2018-01-18 ENCOUNTER — OFFICE VISIT (OUTPATIENT)
Dept: ENDOCRINOLOGY | Age: 38
End: 2018-01-18

## 2018-01-18 VITALS
BODY MASS INDEX: 37.07 KG/M2 | DIASTOLIC BLOOD PRESSURE: 88 MMHG | HEART RATE: 96 BPM | HEIGHT: 60 IN | SYSTOLIC BLOOD PRESSURE: 136 MMHG | WEIGHT: 188.8 LBS

## 2018-01-18 DIAGNOSIS — E05.90 HYPERTHYROIDISM: Primary | ICD-10-CM

## 2018-01-18 DIAGNOSIS — R00.0 TACHYCARDIA: ICD-10-CM

## 2018-01-18 RX ORDER — PROPRANOLOL HYDROCHLORIDE 10 MG/1
10 TABLET ORAL 2 TIMES DAILY
Qty: 60 TAB | Refills: 3 | Status: SHIPPED | OUTPATIENT
Start: 2018-01-18 | End: 2018-05-30 | Stop reason: SDUPTHER

## 2018-01-18 RX ORDER — METHIMAZOLE 5 MG/1
TABLET ORAL
Qty: 30 TAB | Refills: 3 | Status: SHIPPED | OUTPATIENT
Start: 2018-01-18 | End: 2018-04-20

## 2018-01-18 NOTE — PROGRESS NOTES
Chief Complaint   Patient presents with    Thyroid Problem   Records since last visit reviewed  History of Present Illness: Miguel Wood is a 40 y.o. female here for follow up of hyperthyroidism. Pt was originally diagnosed with hyperthyroidism in 2010. She was started on MMI back in 2010 but she was not always able to afford the medication so she has not been on MMI consistently. Dr. Eli Chery restarted her MMI at 20mg daily in March 2016. In February her TRAb was elevated at 21.29. At her last visit in May 2017 her TSH was 0.489 with FT4 of 1.28 and TT3 of 114 on MMI 5mg daily. I recommended she decrease her MMI to 2.5mg daily but after 6 weeks her TSH decreased to 0.057 with FT4 of 1.63 and TT3 of 147. I instructed her to increase her MMI back to 5mg daily. She has been on MMI since March 2016, she has been euthyroid on the MMI 5mg daily, but when I tried to wean her dose any lower she became hyperthyroid again. Pt is taking the MMI 5mg daily and Propranolol 10mg daily. Pt notes she has been feeling well overall, but notes she has been felt fatigued. She denies issues of CP, SOB, palpitations, tremors, diarrhea, constipation, heat or cold intolerance. She denies chocking issues of dysphagia. She denies abdominal pain, muscle or joint pains. Pt is still having issues of dry eyes, she is using saline eye drops as needed. Mostly at night. She denies erythema or eye pain. She saw her eye doctor in March 2017, they told her she was near sighted and gave her glasses. He also gave her eye drops to help with the dry eyes, which have helped. Current Outpatient Prescriptions   Medication Sig    methIMAzole (TAPAZOLE) 5 mg tablet TAKE ONE TABLET BY MOUTH ONCE DAILY    fluticasone (FLONASE) 50 mcg/actuation nasal spray 2 Sprays by Both Nostrils route daily.  cetirizine-psuedoePHEDrine (ZYRTEC-D) 5-120 mg per tablet Take 1 Tab by mouth two (2) times a day.     propranolol (INDERAL) 10 mg tablet Take 1 Tab by mouth two (2) times a day.  busPIRone (BUSPAR) 15 mg tablet Take 0.5 Tabs by mouth two (2) times a day. May increase to 1 tab 2 times daily in 5-7 days if needed.  albuterol (PROVENTIL HFA, VENTOLIN HFA, PROAIR HFA) 90 mcg/actuation inhaler Take 1-2 Puffs by inhalation every six (6) hours as needed for Wheezing. No current facility-administered medications for this visit. No Known Allergies  Review of Systems:  - Cardiovascular: no chest pain  - Neurological: no tremors  - Integumentary: skin is normal    Physical Examination:  Blood pressure 136/88, pulse 96, height 5' (1.524 m), weight 188 lb 12.8 oz (85.6 kg). - General: pleasant, no distress, good eye contact, + exopthalmous  - Neck: minimal thyromegaly or thyroid bruits  - Cardiovascular: regular, normal rate, nl s1 and s2, no m/r/g   - Integumentary: skin is normal, no edema  - Neurological: reflexes 2+ at biceps, no tremors  - Psychiatric: normal mood and affect    Data Reviewed:   -none    Assessment/Plan:   1) Hyperthyroidism > Pt is clinically euthyroid today on MMI 5mg daily. Will check TFTs today and adjust her dose as able. We discussed at length BURKETT and thyroidectomy, the pros and cons vs long term MMI. We agreed to wait and see the results of today's labs and discuss it again. Pt voices understanding and agreement with the plan. RTC 3 months      Follow-up Disposition:  Return in about 3 months (around 4/18/2018).     Copy sent to:  Dr. Kurt Larson

## 2018-01-18 NOTE — MR AVS SNAPSHOT
[address] [place] [address] [address] [address] [phone] Patient: Brandi Rendon 
MRN: [id number] RICK:[id number] Visit Information Date & Time Provider Department Dept. Phone Encounter #  
 1/18/2018 11:10 AM MD Lazaro French Diabetes and Endocrinology [phone] [id number] Follow-up Instructions Return in about 3 months (around 4/18/2018). Upcoming Health Maintenance Date Due DTaP/Tdap/Td series (1 - Tdap) 5/8/2001 PAP AKA CERVICAL CYTOLOGY 5/8/2001 Influenza Age 5 to Adult 8/1/2017 Allergies as of 1/18/2018  Review Complete On: 1/18/2018 By: Jeni Garza MD  
 No Known Allergies Current Immunizations  Never Reviewed No immunizations on file. Not reviewed this visit You Were Diagnosed With   
  
 Codes Comments Hyperthyroidism    -  Primary ICD-10-CM: E05.90 ICD-9-CM: 242.90 Vitals BP Pulse Height(growth percentile) Weight(growth percentile) BMI OB Status 136/88 96 5' (1.524 m) 188 lb 12.8 oz (85.6 kg) 36.87 kg/m2 Having regular periods Smoking Status Never Smoker Vitals History BMI and BSA Data Body Mass Index Body Surface Area  
 36.87 kg/m 2 1.9 m 2 Preferred Pharmacy Pharmacy Name Phone [id number] [place] [place] [phone] Your Updated Medication List  
  
   
This list is accurate as of: 1/18/18 11:25 AM.  Always use your most recent med list.  
  
  
  
  
 albuterol 90 mcg/actuation inhaler Commonly known as:  PROVENTIL HFA, VENTOLIN HFA, PROAIR HFA Take 1-2 Puffs by inhalation every six (6) hours as needed for Wheezing. busPIRone 15 mg tablet Commonly known as:  BUSPAR Take 0.5 Tabs by mouth two (2) times a day. May increase to 1 tab 2 times daily in 5-7 days if needed. cetirizine-psuedoePHEDrine 5-120 mg per tablet Commonly known as:  ZyrTEC-D Take 1 Tab by mouth two (2) times a day. fluticasone 50 mcg/actuation nasal spray Commonly known as:  Cheron Shawn 2 Sprays by Both Nostrils route daily. methIMAzole 5 mg tablet Commonly known as:  TAPAZOLE  
TAKE ONE TABLET BY MOUTH ONCE DAILY propranolol 10 mg tablet Commonly known as:  INDERAL Take 1 Tab by mouth two (2) times a day. We Performed the Following CBC W/O DIFF [33700 CPT(R)] METABOLIC PANEL, COMPREHENSIVE [22807 CPT(R)] TX COLLECTION VENOUS BLOOD,VENIPUNCTURE [id number] CPT(R)] SPECIMEN HANDLING,DR OFF->LAB [32005 CPT(R)]   
 T3 TOTAL [id number] CPT(R)] T4, FREE [id number] CPT(R)] TSH 3RD GENERATION [66004 CPT(R)] Follow-up Instructions Return in about 3 months (around 4/18/2018). Introducing [facility]! [employer] introduces [employer] patient portal. Now you can access parts of your medical record, email your doctor's office, and request medication refills online. 1. In your internet browser, go to [email or web address] in [email or web address] 2. Click on the First Time User? Click Here link in the Sign In box. You will see the New Member Sign Up page. 3. Enter your [employer] Access Code exactly as it appears below. You will not need to use this code after youve completed the sign-up process. If you do not sign up before the expiration date, you must request a new code. · [employer] Access Code: [email or web address] Expires: 4/18/2018 11:25 AM 
 
4. Enter the last four digits of your Social Security Number (xxxx) and Date of Birth (mm/dd/yyyy) as indicated and click Submit. You will be taken to the next sign-up page. 5. Create a [employer] ID. This will be your [employer] login ID and cannot be changed, so think of one that is secure and easy to remember. 6. Create a MyChart password. You can change your password at any time. 7. Enter your Password Reset Question and Answer. This can be used at a later time if you forget your password. 8. Enter your e-mail address. You will receive e-mail notification when new information is available in [address] 9. Click Sign Up. You can now view and download portions of your medical record. 10. Click the Download Summary menu link to download a portable copy of your medical information. If you have questions, please visit the Frequently Asked Questions section of the [employer] website. Remember, [employer] is NOT to be used for urgent needs. For medical emergencies, dial 911. Now available from your iPhone and Android! Please provide this summary of care documentation to your next provider. Your primary care clinician is listed as [address]. If you have any questions after today's visit, please call [phone].

## 2018-01-19 ENCOUNTER — TELEPHONE (OUTPATIENT)
Dept: ENDOCRINOLOGY | Age: 38
End: 2018-01-19

## 2018-01-19 LAB
ALBUMIN SERPL-MCNC: 4.5 G/DL (ref 3.5–5.5)
ALBUMIN/GLOB SERPL: 1.6 {RATIO} (ref 1.2–2.2)
ALP SERPL-CCNC: 62 IU/L (ref 39–117)
ALT SERPL-CCNC: 9 IU/L (ref 0–32)
AST SERPL-CCNC: 14 IU/L (ref 0–40)
BILIRUB SERPL-MCNC: 0.3 MG/DL (ref 0–1.2)
BUN SERPL-MCNC: 11 MG/DL (ref 6–20)
BUN/CREAT SERPL: 14 (ref 9–23)
CALCIUM SERPL-MCNC: 9.8 MG/DL (ref 8.7–10.2)
CHLORIDE SERPL-SCNC: 99 MMOL/L (ref 96–106)
CO2 SERPL-SCNC: 24 MMOL/L (ref 18–29)
CREAT SERPL-MCNC: 0.8 MG/DL (ref 0.57–1)
ERYTHROCYTE [DISTWIDTH] IN BLOOD BY AUTOMATED COUNT: 13.8 % (ref 12.3–15.4)
GLOBULIN SER CALC-MCNC: 2.9 G/DL (ref 1.5–4.5)
GLUCOSE SERPL-MCNC: 93 MG/DL (ref 65–99)
HCT VFR BLD AUTO: 37.5 % (ref 34–46.6)
HGB BLD-MCNC: 12.5 G/DL (ref 11.1–15.9)
MCH RBC QN AUTO: 28.5 PG (ref 26.6–33)
MCHC RBC AUTO-ENTMCNC: 33.3 G/DL (ref 31.5–35.7)
MCV RBC AUTO: 85 FL (ref 79–97)
PLATELET # BLD AUTO: 376 X10E3/UL (ref 150–379)
POTASSIUM SERPL-SCNC: 4.5 MMOL/L (ref 3.5–5.2)
PROT SERPL-MCNC: 7.4 G/DL (ref 6–8.5)
RBC # BLD AUTO: 4.39 X10E6/UL (ref 3.77–5.28)
SODIUM SERPL-SCNC: 139 MMOL/L (ref 134–144)
T3 SERPL-MCNC: 126 NG/DL (ref 71–180)
T4 FREE SERPL-MCNC: 1.42 NG/DL (ref 0.82–1.77)
TSH SERPL DL<=0.005 MIU/L-ACNC: 0.4 UIU/ML (ref 0.45–4.5)
WBC # BLD AUTO: 7.5 X10E3/UL (ref 3.4–10.8)

## 2018-01-19 NOTE — PROGRESS NOTES
Spoke with pt regarding her TFTs. The MMI 5mg has her thyroid labs in a good range. Her CBC and CMP look good. Will continue the MMI 5mg daily.

## 2018-04-13 ENCOUNTER — LAB ONLY (OUTPATIENT)
Dept: ENDOCRINOLOGY | Age: 38
End: 2018-04-13

## 2018-04-13 DIAGNOSIS — E05.90 HYPERTHYROIDISM: Primary | ICD-10-CM

## 2018-04-14 LAB
T3 SERPL-MCNC: 129 NG/DL (ref 71–180)
T4 FREE SERPL-MCNC: 1.75 NG/DL (ref 0.82–1.77)
TSH SERPL DL<=0.005 MIU/L-ACNC: 0.47 UIU/ML (ref 0.45–4.5)

## 2018-04-20 ENCOUNTER — OFFICE VISIT (OUTPATIENT)
Dept: ENDOCRINOLOGY | Age: 38
End: 2018-04-20

## 2018-04-20 VITALS
WEIGHT: 189.8 LBS | HEART RATE: 91 BPM | HEIGHT: 60 IN | SYSTOLIC BLOOD PRESSURE: 116 MMHG | BODY MASS INDEX: 37.26 KG/M2 | DIASTOLIC BLOOD PRESSURE: 60 MMHG

## 2018-04-20 DIAGNOSIS — E05.90 HYPERTHYROIDISM: Primary | ICD-10-CM

## 2018-04-20 RX ORDER — PROPYLTHIOURACIL 50 MG/1
50 TABLET ORAL 2 TIMES DAILY
Qty: 60 TAB | Refills: 1 | Status: SHIPPED | OUTPATIENT
Start: 2018-04-20 | End: 2018-05-20

## 2018-04-20 RX ORDER — DICLOFENAC SODIUM 75 MG/1
TABLET, DELAYED RELEASE ORAL
Refills: 1 | COMMUNITY
Start: 2018-03-29 | End: 2019-06-25

## 2018-04-20 NOTE — PROGRESS NOTES
Chief Complaint   Patient presents with    Thyroid Problem     pcp and pharmacy verified   Records since last visit reviewed  History of Present Illness: Micheal Gonzalez is a 40 y.o. female here for follow up of hyperthyroidism. Pt was originally diagnosed with hyperthyroidism in 2010. She was started on MMI back in 2010 but she was not always able to afford the medication so she had not been on MMI consistently. Dr. William Mcduffie restarted her MMI at 20mg daily in March 2016. In February her TRAb was elevated at 21.29. She has been on MMI since March 2016, she has been euthyroid on the MMI 5mg daily, but when I tried to wean her dose any lower, we have tried 2.5mg on two different occasions, she became hyperthyroid again. At her last visit in January 2018 her TSH was 0.403 with FT4 of 1.42 and TT3 of 126 on MMI 5mg daily. Pt is taking the MMI 5mg daily and Propranolol 10mg daily. Her TSH last week was 0.472 with FT4 of 1.75 and TT3 of 129. Her CBC and CMP in January 2018 looked good. Pt reports that she found out last week she was pregnant. She notes she missed her cycle on 4/8/18. She is not sure how far into her pregnancy she currently is. She has an appointment with OB-Gyn of 4/24/18. She denies issues of CP, SOB, palpitations, tremors, diarrhea, constipation, heat or cold intolerance. She denies chocking issues of dysphagia. She denies abdominal pain, muscle or joint pains. Pt is still having issues of dry eyes, she is using saline eye drops as needed. Mostly at night. She denies erythema or eye pain. She notes that she has a new job and once her new insurance becomes active she is going to go back to the eye doctor. Current Outpatient Prescriptions   Medication Sig    diclofenac EC (VOLTAREN) 75 mg EC tablet TAKE 1 TABLET BY MOUTH TWICE A DAY AFTER EATING    propylthiouracil (PTU) 50 mg tablet Take 1 Tab by mouth two (2) times a day for 30 days.     propranolol (INDERAL) 10 mg tablet Take 1 Tab by mouth two (2) times a day.  fluticasone (FLONASE) 50 mcg/actuation nasal spray 2 Sprays by Both Nostrils route daily.  cetirizine-psuedoePHEDrine (ZYRTEC-D) 5-120 mg per tablet Take 1 Tab by mouth two (2) times a day.  busPIRone (BUSPAR) 15 mg tablet Take 0.5 Tabs by mouth two (2) times a day. May increase to 1 tab 2 times daily in 5-7 days if needed.  albuterol (PROVENTIL HFA, VENTOLIN HFA, PROAIR HFA) 90 mcg/actuation inhaler Take 1-2 Puffs by inhalation every six (6) hours as needed for Wheezing. No current facility-administered medications for this visit. No Known Allergies  Review of Systems:  - Cardiovascular: no chest pain  - Neurological: no tremors  - Integumentary: skin is normal    Physical Examination:  Blood pressure 116/60, pulse 91, height 5' (1.524 m), weight 189 lb 12.8 oz (86.1 kg). - General: pleasant, no distress, good eye contact, + exopthalmous  - Neck: minimal thyromegaly or thyroid bruits  - Cardiovascular: regular, normal rate, nl s1 and s2, no m/r/g   - Integumentary: skin is normal, no edema  - Neurological: reflexes 2+ at biceps, no tremors  - Psychiatric: normal mood and affect    Data Reviewed:   Component      Latest Ref Rng & Units 4/13/2018 4/13/2018 4/13/2018           9:32 AM  9:32 AM  9:32 AM   TSH      0.450 - 4.500 uIU/mL   0.472   T4, Free      0.82 - 1.77 ng/dL  1.75    T3, total      71 - 180 ng/dL 129         Assessment/Plan:   1) Hyperthyroidism > Pt is clinically and biochemically euthyroid today on MMI 5mg daily. Since she is now pregnant, we will switch her to PTU 50mg BID. I will want pt to have repeat TFTs in 4 weeks and we can adjust her PTU as needed. Pt voices understanding and agreement with the plan. RTC 2 months      Follow-up Disposition:  Return in 8 weeks (on 6/15/2018).     Copy sent to:  Dr. Snider Client

## 2018-05-30 DIAGNOSIS — E05.90 HYPERTHYROIDISM: ICD-10-CM

## 2018-05-30 DIAGNOSIS — R00.0 TACHYCARDIA: ICD-10-CM

## 2018-05-30 RX ORDER — PROPRANOLOL HYDROCHLORIDE 10 MG/1
TABLET ORAL
Qty: 60 TAB | Refills: 3 | Status: SHIPPED | OUTPATIENT
Start: 2018-05-30 | End: 2019-03-21 | Stop reason: SDUPTHER

## 2018-09-04 ENCOUNTER — OFFICE VISIT (OUTPATIENT)
Dept: ENDOCRINOLOGY | Age: 38
End: 2018-09-04

## 2018-09-04 VITALS
WEIGHT: 200.2 LBS | HEIGHT: 60 IN | DIASTOLIC BLOOD PRESSURE: 74 MMHG | BODY MASS INDEX: 39.3 KG/M2 | SYSTOLIC BLOOD PRESSURE: 130 MMHG | HEART RATE: 99 BPM

## 2018-09-04 DIAGNOSIS — E05.90 HYPERTHYROIDISM: Primary | ICD-10-CM

## 2018-09-04 NOTE — PROGRESS NOTES
Chief Complaint   Patient presents with    Thyroid Problem     pcp and pharmacy verified   Records since last visit reviewed  History of Present Illness: Brett Muir is a 45 y.o. female here for follow up of hyperthyroidism. Pt was originally diagnosed with hyperthyroidism in 2010. She was started on MMI back in 2010 but she was not always able to afford the medication so she had not been on MMI consistently. Dr. Felipe Loera restarted her MMI at 20mg daily in March 2016. In February her TRAb was elevated at 21.29. She had been on MMI since March 2016, she has been euthyroid on the MMI 5mg daily, but when I tried to wean her dose any lower, we have tried 2.5mg on two different occasions, she became hyperthyroid again. At our last visit in April 2018 she had just found out she was pregnant. She was still taking the MMI 5mg daily and Propranolol 10mg BID. Since she was pregnant I changed her MMI 5mg daily to PTU 50mg BID. Pt was to follow up with me in 4 weeks. Since that time pt has had a cancellation and a no show. Pt had a miscarriage in May 2018. Pt notes that after her miscarriage she restarted the MMI 5mg daily. Pt is taking the MMI 5mg daily and Propranolol 10mg BID. She denies issues of CP, SOB, palpitations, tremors, diarrhea, constipation, heat or cold intolerance. She denies chocking issues of dysphagia. She denies abdominal pain, muscle or joint pains. Pt is still having issues of dry eyes, she is using saline eye drops as needed. Mostly at night. She denies erythema or eye pain. She was able to see the eye doctor who gave her eye drops, which have been helping. Current Outpatient Prescriptions   Medication Sig    propranolol (INDERAL) 10 mg tablet TAKE 1 TABLET BY MOUTH TWO (2) TIMES A DAY.     diclofenac EC (VOLTAREN) 75 mg EC tablet TAKE 1 TABLET BY MOUTH TWICE A DAY AFTER EATING    fluticasone (FLONASE) 50 mcg/actuation nasal spray 2 Sprays by Both Nostrils route daily.  cetirizine-psuedoePHEDrine (ZYRTEC-D) 5-120 mg per tablet Take 1 Tab by mouth two (2) times a day.  busPIRone (BUSPAR) 15 mg tablet Take 0.5 Tabs by mouth two (2) times a day. May increase to 1 tab 2 times daily in 5-7 days if needed.  albuterol (PROVENTIL HFA, VENTOLIN HFA, PROAIR HFA) 90 mcg/actuation inhaler Take 1-2 Puffs by inhalation every six (6) hours as needed for Wheezing. No current facility-administered medications for this visit. No Known Allergies  Review of Systems:  - Cardiovascular: no chest pain  - Neurological: no tremors  - Integumentary: skin is normal    Physical Examination:  Blood pressure 130/74, pulse 99, height 5' (1.524 m), weight 200 lb 3.2 oz (90.8 kg). - General: pleasant, no distress, good eye contact, + exopthalmous  - Neck: minimal thyromegaly or thyroid bruits  - Cardiovascular: regular, normal rate, nl s1 and s2, no m/r/g   - Integumentary: skin is normal, no edema  - Neurological: reflexes 2+ at biceps, no tremors  - Psychiatric: normal mood and affect    Data Reviewed:   - None    Assessment/Plan:   1) Hyperthyroidism > Pt has been back on MMI 5mg daily since her Miscarriage in May 2018. She is clinically and biochemically euthyroid on the MMI 5mg daily. Will check TFTs and adjust her MMI as able. She will need a refill on her MMI once the TFTs are back. Pt voices understanding and agreement with the plan. RTC 3 months      Follow-up Disposition:  Return in about 3 months (around 12/4/2018).     Copy sent to:  Dr. Helen Willson

## 2018-09-05 LAB
ALBUMIN SERPL-MCNC: 4.7 G/DL (ref 3.5–5.5)
ALBUMIN/GLOB SERPL: 1.6 {RATIO} (ref 1.2–2.2)
ALP SERPL-CCNC: 60 IU/L (ref 39–117)
ALT SERPL-CCNC: 8 IU/L (ref 0–32)
AST SERPL-CCNC: 13 IU/L (ref 0–40)
BILIRUB SERPL-MCNC: 0.4 MG/DL (ref 0–1.2)
BUN SERPL-MCNC: 12 MG/DL (ref 6–20)
BUN/CREAT SERPL: 14 (ref 9–23)
CALCIUM SERPL-MCNC: 9.4 MG/DL (ref 8.7–10.2)
CHLORIDE SERPL-SCNC: 103 MMOL/L (ref 96–106)
CO2 SERPL-SCNC: 20 MMOL/L (ref 20–29)
CREAT SERPL-MCNC: 0.83 MG/DL (ref 0.57–1)
ERYTHROCYTE [DISTWIDTH] IN BLOOD BY AUTOMATED COUNT: 13.6 % (ref 12.3–15.4)
GLOBULIN SER CALC-MCNC: 3 G/DL (ref 1.5–4.5)
GLUCOSE SERPL-MCNC: 121 MG/DL (ref 65–99)
HCT VFR BLD AUTO: 37.5 % (ref 34–46.6)
HGB BLD-MCNC: 12.2 G/DL (ref 11.1–15.9)
MCH RBC QN AUTO: 28.6 PG (ref 26.6–33)
MCHC RBC AUTO-ENTMCNC: 32.5 G/DL (ref 31.5–35.7)
MCV RBC AUTO: 88 FL (ref 79–97)
PLATELET # BLD AUTO: 339 X10E3/UL (ref 150–379)
POTASSIUM SERPL-SCNC: 3.9 MMOL/L (ref 3.5–5.2)
PROT SERPL-MCNC: 7.7 G/DL (ref 6–8.5)
RBC # BLD AUTO: 4.27 X10E6/UL (ref 3.77–5.28)
SODIUM SERPL-SCNC: 142 MMOL/L (ref 134–144)
T3 SERPL-MCNC: 145 NG/DL (ref 71–180)
T4 FREE SERPL-MCNC: 1.76 NG/DL (ref 0.82–1.77)
TSH SERPL DL<=0.005 MIU/L-ACNC: 0.09 UIU/ML (ref 0.45–4.5)
WBC # BLD AUTO: 9 X10E3/UL (ref 3.4–10.8)

## 2018-09-05 RX ORDER — METHIMAZOLE 5 MG/1
5 TABLET ORAL DAILY
Qty: 30 TAB | Refills: 5 | Status: SHIPPED | OUTPATIENT
Start: 2018-09-05 | End: 2018-12-05 | Stop reason: DRUGHIGH

## 2018-11-26 ENCOUNTER — LAB ONLY (OUTPATIENT)
Dept: ENDOCRINOLOGY | Age: 38
End: 2018-11-26

## 2018-11-26 DIAGNOSIS — E05.90 HYPERTHYROIDISM: Primary | ICD-10-CM

## 2018-11-27 LAB
T3 SERPL-MCNC: 217 NG/DL (ref 71–180)
T4 FREE SERPL-MCNC: 2.75 NG/DL (ref 0.82–1.77)
TSH SERPL DL<=0.005 MIU/L-ACNC: <0.006 UIU/ML (ref 0.45–4.5)

## 2018-12-04 DIAGNOSIS — E05.90 HYPERTHYROIDISM: ICD-10-CM

## 2018-12-05 ENCOUNTER — OFFICE VISIT (OUTPATIENT)
Dept: ENDOCRINOLOGY | Age: 38
End: 2018-12-05

## 2018-12-05 VITALS
WEIGHT: 201.6 LBS | BODY MASS INDEX: 39.58 KG/M2 | SYSTOLIC BLOOD PRESSURE: 129 MMHG | TEMPERATURE: 98 F | DIASTOLIC BLOOD PRESSURE: 76 MMHG | HEIGHT: 60 IN

## 2018-12-05 DIAGNOSIS — E05.90 HYPERTHYROIDISM: Primary | ICD-10-CM

## 2018-12-05 RX ORDER — METHIMAZOLE 10 MG/1
20 TABLET ORAL DAILY
Qty: 60 TAB | Refills: 1 | Status: SHIPPED | OUTPATIENT
Start: 2018-12-05 | End: 2018-12-19 | Stop reason: SDUPTHER

## 2018-12-05 NOTE — PATIENT INSTRUCTIONS
Radioactive Iodine: What to Expect at Home  Your Recovery  Radioactive iodine is absorbed and concentrated by the thyroid gland. You get it in liquid or pill form. The radiation will pass out of your body through your urine within days. Until that time, you will give off radiation in your sweat, your saliva, your urine, and anything else that comes out of your body. It is important to avoid exposing other people to the radioactivity from your body. Your doctor will give you more written instructions. Follow these carefully. The instructions will tell you how far to stay away from people and how long you need to follow the precautions listed below. They will list other ways to keep other people safe. They will also tell you when it will be safe to go out, go to work, and do other activities. How can you care for yourself at home? General recommendations  · For a period of time, you will need to keep your distance from other people, especially young children and pregnant women. · Avoid close contact, kissing, and sexual activity. You may need to sleep in a separate bed from your partner. · Keep the toilet very clean. Men should urinate sitting down to avoid splashing. Flush the toilet 2 or 3 times after each use. Wash your hands well with soap and lots of water each time you use the toilet. · Rinse the bathroom sink and tub well after you use them. · Use separate towels, washcloths, and sheets. Wash these and your personal clothing by themselves. Don't wash them with other people's laundry. · You may want to use a special plastic trash bag for all your trash, such as bandages, paper or plastic dishes, menstrual pads, tissues, or paper towels. Talk to your treatment facility to see if they will handle the disposal. Or after 80 days, this bag can be thrown out with your other trash. · Wash your dishes in a  or by hand.  If you use disposable dishes, they must be thrown away in the special plastic trash bag. · Don't cook for other people. If you must cook, use plastic gloves. Then throw them away in the special plastic trash bag. Don't share cups, dishes, or utensils. Pregnancy and children  · Your doctor will tell you when it is safe to have sex and become pregnant. · You should not breastfeed your baby after you have been treated with radioactive iodine. Ask your doctor when it's safe to breastfeed. Travel  · Don't take public transportation. If you are able, it's best to drive yourself. · It is important to prepare for any problems you may have at airport security. People who have had radioactive iodine treatment can set off the radiation detection machines in airports for a week to 10 days. Check with local authorities about any steps or permission you may need to travel. · If you plan to travel on the interstate, you may set off radiation detectors. Most police and transportation workers are aware of medical radiation, but it may help to carry some paperwork from your doctor. Follow-up care is a key part of your treatment and safety. Be sure to make and go to all appointments, and call your doctor if you are having problems. It's also a good idea to know your test results and keep a list of the medicines you take. When should you call for help? Watch closely for any changes in your health, and be sure to contact your doctor if:    · You have a sore throat.     · You vomit.     · You have diarrhea. Where can you learn more? Go to http://darek-esther.info/. Enter M281 in the search box to learn more about \"Radioactive Iodine: What to Expect at Home. \"  Current as of: March 15, 2018  Content Version: 11.8  © 1563-1012 [employer]. Care instructions adapted under license by [employer] (which disclaims liability or warranty for this information).  If you have questions about a medical condition or this instruction, always ask your healthcare professional. [employer], [facility] disclaims any warranty or liability for your use of this information. Thyroidectomy: Before Your Surgery  What is a thyroidectomy? A thyroidectomy is surgery to take out your thyroid gland. This gland is shaped like a butterfly. It lies across the windpipe (trachea). The gland makes hormones that control how your body makes and uses energy (metabolism). A doctor removes the gland when it gets too big, does not work right, or has a tumor. Most tumors that grow in this gland are benign. This means they are not cancer. The doctor will take out the thyroid through a cut (incision) in the front of your neck. You will likely have a tube, called a drain, in your neck to let fluid out of the cut. The drain is most often taken out before you go home. You may go home on the same day. Or you may stay one or more nights in the hospital. You may return to work or your normal routine in 1 to 2 weeks. This depends on whether you need more treatment and how you feel. It may also depend on the kind of work you do. Your doctor will check your incision in about a week. You may need to take thyroid medicine. If you have thyroid cancer, you may need to have radioactive iodine therapy. Your doctor will talk to you about what happens next. Follow-up care is a key part of your treatment and safety. Be sure to make and go to all appointments, and call your doctor if you are having problems. It's also a good idea to know your test results and keep a list of the medicines you take. What happens before surgery?   Surgery can be stressful. This information will help you understand what you can expect. And it will help you safely prepare for surgery.   Preparing for surgery    · Understand exactly what surgery is planned, along with the risks, benefits, and other options. · Tell your doctors ALL the medicines, vitamins, supplements, and herbal remedies you take.  Some of these can increase the risk of bleeding or interact with anesthesia.     · If you take blood thinners, such as warfarin (Coumadin), clopidogrel (Plavix), or aspirin, be sure to talk to your doctor. He or she will tell you if you should stop taking these medicines before your surgery. Make sure that you understand exactly what your doctor wants you to do.     · Your doctor will tell you which medicines to take or stop before your surgery. You may need to stop taking certain medicines a week or more before surgery. So talk to your doctor as soon as you can.     · If you have an advance directive, let your doctor know. It may include a living will and a durable power of  for health care. Bring a copy to the hospital. If you don't have one, you may want to prepare one. It lets your doctor and loved ones know your health care wishes. Doctors suggest that everyone prepare these papers before any type of surgery or procedure. What happens on the day of surgery? · Follow the instructions exactly about when to stop eating and drinking. If you don't, your surgery may be canceled. If your doctor told you to take your medicines on the day of surgery, take them with only a sip of water.     · Take a bath or shower before you come in for your surgery. Do not apply lotions, perfumes, deodorants, or nail polish.     · Do not shave the surgical site yourself.     · Take off all jewelry and piercings. And take out contact lenses, if you wear them.    At the hospital or surgery center   · Bring a picture ID.     · The area for surgery is often marked to make sure there are no errors.     · You will be kept comfortable and safe by your anesthesia provider. You will be asleep during the surgery.     · How long your surgery takes depends on how complex it is. Going home   · Be sure you have someone to drive you home.  Anesthesia and pain medicine make it unsafe for you to drive.     · You will be given more specific instructions about recovering from your surgery. They will cover things like diet, wound care, follow-up care, driving, and getting back to your normal routine. When should you call your doctor? · You have questions or concerns.     · You don't understand how to prepare for your surgery.     · You become ill before the surgery (such as fever, flu, or a cold).     · You need to reschedule or have changed your mind about having the surgery. Where can you learn more? Go to http://darek-esther.info/. Enter J057 in the search box to learn more about \"Thyroidectomy: Before Your Surgery. \"  Current as of: March 15, 2018  Content Version: 11.8  © 1183-3919 [employer], [employer]. Care instructions adapted under license by [employer] (which disclaims liability or warranty for this information). If you have questions about a medical condition or this instruction, always ask your healthcare professional. [address] any warranty or liability for your use of this information.

## 2018-12-05 NOTE — PROGRESS NOTES
Chief Complaint   Patient presents with    Thyroid Problem     pcp and pharmacy verified   Records since last visit reviewed  History of Present Illness: Rosario Lacey is a 45 y.o. female here for follow up of hyperthyroidism. Pt was originally diagnosed with hyperthyroidism in 2010. She was started on MMI back in 2010 but she was not always able to afford the medication so she had not been on MMI consistently. At our visit in April 2018 she had just found out she was pregnant. She was still taking the MMI 5mg daily and Propranolol 10mg BID. Since she was pregnant I changed her MMI 5mg daily to PTU 50mg BID. Pt was to follow up with me in 4 weeks. Pt had a miscarriage in May 2018. Pt notes that after her miscarriage she restarted the MMI 5mg daily. At our last visit in September 2018 her TSH was 0.087 and pt was instructed to continue the MMI 5mg daily. Last week her TSH was <0.006 with FT4 of 2.75 with FT4 of 217. Pt is taking the MMI 5mg every day. She notes she does not typically take it on the weekend, though she is taking it Mon-Fri. She is also taking the Propranolol 10mg in the AM only. She notes if she takes it HS it causes her to go to the bathroom all night. She notes she has been having more palpitations, but not tremors, SOB, CP. She denies issues of diarrhea, constipation, heat or cold intolerance. She denies chocking issues of dysphagia. Pt notes her dry eyes are improved. She is using saline eye drops as needed. Mostly at night. She denies erythema or eye pain. She was able to see the eye doctor who gave her eye drops, which have been helping. Current Outpatient Medications   Medication Sig    methIMAzole (TAPAZOLE) 10 mg tablet Take 2 Tabs by mouth daily.  propranolol (INDERAL) 10 mg tablet TAKE 1 TABLET BY MOUTH TWO (2) TIMES A DAY.     diclofenac EC (VOLTAREN) 75 mg EC tablet TAKE 1 TABLET BY MOUTH TWICE A DAY AFTER EATING    fluticasone (FLONASE) 50 mcg/actuation nasal spray 2 Sprays by Both Nostrils route daily.  busPIRone (BUSPAR) 15 mg tablet Take 0.5 Tabs by mouth two (2) times a day. May increase to 1 tab 2 times daily in 5-7 days if needed.  albuterol (PROVENTIL HFA, VENTOLIN HFA, PROAIR HFA) 90 mcg/actuation inhaler Take 1-2 Puffs by inhalation every six (6) hours as needed for Wheezing.  cetirizine-psuedoePHEDrine (ZYRTEC-D) 5-120 mg per tablet Take 1 Tab by mouth two (2) times a day. No current facility-administered medications for this visit. No Known Allergies  Review of Systems:  - Cardiovascular: no chest pain  - Neurological: no tremors  - Integumentary: skin is normal    Physical Examination:  Blood pressure 129/76, temperature 98 °F (36.7 °C), height 5' (1.524 m), weight 201 lb 9.6 oz (91.4 kg). - General: pleasant, no distress, good eye contact, + exopthalmous  - Neck: minimal thyromegaly or thyroid bruits  - Cardiovascular: regular, normal rate, nl s1 and s2, no m/r/g   - Integumentary: skin is normal, no edema  - Neurological: reflexes 2+ at biceps, no tremors  - Psychiatric: normal mood and affect    Data Reviewed:   Component      Latest Ref Rng & Units 11/26/2018 11/26/2018 11/26/2018          10:33 AM 10:33 AM 10:33 AM   TSH      0.450 - 4.500 uIU/mL   <0.006 (L)   T4, Free      0.82 - 1.77 ng/dL  2.75 (H)    T3, total      71 - 180 ng/dL 217 (H)         Assessment/Plan:   1) Hyperthyroidism > Pt is again hyperthyroid. We will increase her MMI to 20mg daily for the next 6 weeks and then repeat her TFTs in 6 weeks. We discussed B[place] and thyroidectomy, but she wants to stick with the MMI for now. Pt voices understanding and agreement with the plan. RTC 3 months      Follow-up Disposition:  Return in about 3 months (around 3/5/2019).     Copy sent to:  Dr. Rivka Beth

## 2018-12-19 DIAGNOSIS — E05.90 HYPERTHYROIDISM: Primary | ICD-10-CM

## 2018-12-19 RX ORDER — METHIMAZOLE 10 MG/1
20 TABLET ORAL DAILY
Qty: 180 TAB | Refills: 0 | Status: SHIPPED | OUTPATIENT
Start: 2018-12-19 | End: 2019-06-07 | Stop reason: SDUPTHER

## 2019-01-16 DIAGNOSIS — E05.90 HYPERTHYROIDISM: ICD-10-CM

## 2019-03-21 DIAGNOSIS — R00.0 TACHYCARDIA: ICD-10-CM

## 2019-03-21 DIAGNOSIS — E05.90 HYPERTHYROIDISM: ICD-10-CM

## 2019-03-21 RX ORDER — PROPRANOLOL HYDROCHLORIDE 10 MG/1
TABLET ORAL
Qty: 60 TAB | Refills: 3 | Status: SHIPPED | OUTPATIENT
Start: 2019-03-21 | End: 2021-02-05

## 2019-05-08 ENCOUNTER — OFFICE VISIT (OUTPATIENT)
Dept: INTERNAL MEDICINE CLINIC | Age: 39
End: 2019-05-08

## 2019-05-08 VITALS
SYSTOLIC BLOOD PRESSURE: 139 MMHG | HEIGHT: 60 IN | BODY MASS INDEX: 38.32 KG/M2 | WEIGHT: 195.2 LBS | RESPIRATION RATE: 18 BRPM | DIASTOLIC BLOOD PRESSURE: 72 MMHG | TEMPERATURE: 99.3 F | HEART RATE: 107 BPM | OXYGEN SATURATION: 97 %

## 2019-05-08 DIAGNOSIS — J02.9 PHARYNGITIS, UNSPECIFIED ETIOLOGY: ICD-10-CM

## 2019-05-08 DIAGNOSIS — J40 BRONCHITIS: Primary | ICD-10-CM

## 2019-05-08 DIAGNOSIS — J45.20 MILD INTERMITTENT ASTHMA WITHOUT COMPLICATION: ICD-10-CM

## 2019-05-08 DIAGNOSIS — E66.01 SEVERE OBESITY (HCC): ICD-10-CM

## 2019-05-08 RX ORDER — CEFDINIR 300 MG/1
300 CAPSULE ORAL 2 TIMES DAILY
Qty: 20 CAP | Refills: 0 | Status: SHIPPED | OUTPATIENT
Start: 2019-05-08 | End: 2019-05-18

## 2019-05-08 RX ORDER — BENZONATATE 200 MG/1
200 CAPSULE ORAL
Qty: 30 CAP | Refills: 0 | Status: SHIPPED | OUTPATIENT
Start: 2019-05-08 | End: 2019-05-15

## 2019-05-08 RX ORDER — CODEINE PHOSPHATE AND GUAIFENESIN 10; 100 MG/5ML; MG/5ML
5 SOLUTION ORAL
Qty: 118 ML | Refills: 0 | Status: SHIPPED | OUTPATIENT
Start: 2019-05-08 | End: 2019-05-11

## 2019-05-08 NOTE — LETTER
NOTIFICATION RETURN TO WORK / SCHOOL 
 
5/8/2019 10:13 AM 
 
Ms. Sherryle Freund 
[address] [address] To Whom It May Concern: 
 
Sherryle Freund is currently under the care of [facility]. She will return to work/school on: May 10, 2019 If there are questions or concerns please have the patient contact our office. Sincerely, Mirza Knight NP

## 2019-05-08 NOTE — PROGRESS NOTES
Exam room Dagoberto Adams is a 44 y.o. female    Chief Complaint   Patient presents with    Cough     1. Have you been to the ER, urgent care clinic since your last visit? Hospitalized since your last visit? Yes When: 4/27/2019 Where: [address] [place] [address] Reason for visit: Pharyngitis   Strep test done in ER and negative. 2. Have you seen or consulted any other health care providers outside of the [address] [place] [address] since your last visit? Include any pap smears or colon screening.  No    Health Maintenance Due   Topic Date Due    DTaP/Tdap/Td series (1 - Tdap) 05/08/2001    PAP AKA CERVICAL CYTOLOGY  05/08/2001

## 2019-05-08 NOTE — PROGRESS NOTES
HISTORY OF PRESENT ILLNESS  Casandar Garcia is a 44 y.o. female. HPI  April 27 was seen in ED with chills,  Dizziness, ST, cough     Diagnosed with pharyngitis, prescribed cough syrup strep negative. Yesterday cough got worse  threw up, stress incontinence     + chills, no fever    No history pneumonia, + asthma  mild      Past Medical History:   Diagnosis Date    Anxiety     History of panic attacks.  Asthma     Endocrine disease     hyperthyroidism    Patellar tracking disorder     Bilat. Ortho eval with Dr. Isidro Pean April 2018. Primary osteoarthritis right knee. Current Outpatient Medications   Medication Sig    cefdinir (OMNICEF) 300 mg capsule Take 1 Cap by mouth two (2) times a day for 10 days.  propranolol (INDERAL) 10 mg tablet TAKE 1 TABLET BY MOUTH TWO (2) TIMES A DAY.  diclofenac EC (VOLTAREN) 75 mg EC tablet TAKE 1 TABLET BY MOUTH TWICE A DAY AFTER EATING    busPIRone (BUSPAR) 15 mg tablet Take 0.5 Tabs by mouth two (2) times a day. May increase to 1 tab 2 times daily in 5-7 days if needed.  albuterol (PROVENTIL HFA, VENTOLIN HFA, PROAIR HFA) 90 mcg/actuation inhaler Take 1-2 Puffs by inhalation every six (6) hours as needed for Wheezing.  methIMAzole (TAPAZOLE) 10 mg tablet Take 2 Tabs by mouth daily. (Patient not taking: Reported on 5/8/2019)    fluticasone (FLONASE) 50 mcg/actuation nasal spray 2 Sprays by Both Nostrils route daily. (Patient not taking: Reported on 5/8/2019)    cetirizine-psuedoePHEDrine (ZYRTEC-D) 5-120 mg per tablet Take 1 Tab by mouth two (2) times a day. No current facility-administered medications for this visit. No Known Allergies  Review of Systems   Constitutional: Positive for malaise/fatigue. Negative for chills and fever. HENT: Positive for sore throat. Negative for congestion. Eyes: Negative. Respiratory: Positive for cough and sputum production. Negative for hemoptysis, shortness of breath and wheezing. Cardiovascular: Negative. Gastrointestinal: Negative. Musculoskeletal: Negative. Skin: Negative. Neurological: Positive for dizziness. Visit Vitals  /72 (BP 1 Location: Left arm, BP Patient Position: Sitting)   Pulse (!) 107   Temp 99.3 °F (37.4 °C) (Oral)   Resp 18   Ht 5' (1.524 m)   Wt 195 lb 3.2 oz (88.5 kg)   LMP 04/16/2019   SpO2 97%   BMI 38.12 kg/m²     Physical Exam   Constitutional: She is oriented to person, place, and time. She appears well-developed and well-nourished. No distress. Cardiovascular: Regular rhythm. Tachycardia present. Pulmonary/Chest: Effort normal and breath sounds normal. No accessory muscle usage. No respiratory distress. She has no decreased breath sounds. She has no wheezes. She has no rhonchi. She has no rales. Paroxysmal cough    Musculoskeletal: She exhibits no edema or tenderness. Lymphadenopathy:     She has no cervical adenopathy. Neurological: She is alert and oriented to person, place, and time. Skin: Skin is warm and dry. No rash noted. She is not diaphoretic. No erythema. Psychiatric: She has a normal mood and affect. Her behavior is normal. Judgment and thought content normal.       ASSESSMENT and PLAN    ICD-10-CM ICD-9-CM    1. Bronchitis J40 490 cefdinir (OMNICEF) 300 mg capsule      benzonatate (TESSALON) 200 mg capsule      guaiFENesin-codeine (ROBITUSSIN AC) 100-10 mg/5 mL solution   2. Pharyngitis, unspecified etiology J02.9 462    3. Mild intermittent asthma without complication Q05.20 000.75      Follow-up and Dispositions    · Return if symptoms worsen or fail to improve. reviewed medications and side effects in detail    Reviewed supportive care measure, indications for call back    Patient voices understanding and acceptance of this advice and will call back if any further questions or concerns. An After Visit Summary was printed and given to the patient.

## 2019-05-08 NOTE — PATIENT INSTRUCTIONS
Sore Throat: Care Instructions  Your Care Instructions    Infection by bacteria or a virus causes most sore throats. Cigarette smoke, dry air, air pollution, allergies, and yelling can also cause a sore throat. Sore throats can be painful and annoying. Fortunately, most sore throats go away on their own. If you have a bacterial infection, your doctor may prescribe antibiotics. Follow-up care is a key part of your treatment and safety. Be sure to make and go to all appointments, and call your doctor if you are having problems. It's also a good idea to know your test results and keep a list of the medicines you take. How can you care for yourself at home? · If your doctor prescribed antibiotics, take them as directed. Do not stop taking them just because you feel better. You need to take the full course of antibiotics. · Gargle with warm salt water once an hour to help reduce swelling and relieve discomfort. Use 1 teaspoon of salt mixed in 1 cup of warm water. · Take an over-the-counter pain medicine, such as acetaminophen (Tylenol), ibuprofen (Advil, Motrin), or naproxen (Aleve). Read and follow all instructions on the label. · Be careful when taking over-the-counter cold or flu medicines and Tylenol at the same time. Many of these medicines have acetaminophen, which is Tylenol. Read the labels to make sure that you are not taking more than the recommended dose. Too much acetaminophen (Tylenol) can be harmful. · Drink plenty of fluids. Fluids may help soothe an irritated throat. Hot fluids, such as tea or soup, may help decrease throat pain. · Use over-the-counter throat lozenges to soothe pain. Regular cough drops or hard candy may also help. These should not be given to young children because of the risk of choking. · Do not smoke or allow others to smoke around you. If you need help quitting, talk to your doctor about stop-smoking programs and medicines.  These can increase your chances of quitting for good. · Use a vaporizer or humidifier to add moisture to your bedroom. Follow the directions for cleaning the machine. When should you call for help? Call your doctor now or seek immediate medical care if:    · You have new or worse trouble swallowing.     · Your sore throat gets much worse on one side.    Watch closely for changes in your health, and be sure to contact your doctor if you do not get better as expected. Where can you learn more? Go to http://darek-esther.info/. Enter 062 [id number] in the search box to learn more about \"Sore Throat: Care Instructions. \"  Current as of: March 27, 2018  Content Version: 11.9  © 9647-5521 [employer]. Care instructions adapted under license by [employer] (which disclaims liability or warranty for this information). If you have questions about a medical condition or this instruction, always ask your healthcare professional. [place] [address] any warranty or liability for your use of this information. Bronchitis: Care Instructions  Your Care Instructions    Bronchitis is inflammation of the bronchial tubes, which carry air to the lungs. The tubes swell and produce mucus, or phlegm. The mucus and inflamed bronchial tubes make you cough. You may have trouble breathing. Most cases of bronchitis are caused by viruses like those that cause colds. Antibiotics usually do not help and they may be harmful. Bronchitis usually develops rapidly and lasts about 2 to 3 weeks in otherwise healthy people. Follow-up care is a key part of your treatment and safety. Be sure to make and go to all appointments, and call your doctor if you are having problems. It's also a good idea to know your test results and keep a list of the medicines you take. How can you care for yourself at home? · Take all medicines exactly as prescribed. Call your doctor if you think you are having a problem with your medicine.   · Get some extra rest.  · Take an over-the-counter pain medicine, such as acetaminophen (Tylenol), ibuprofen (Advil, Motrin), or naproxen (Aleve) to reduce fever and relieve body aches. Read and follow all instructions on the label. · Do not take two or more pain medicines at the same time unless the doctor told you to. Many pain medicines have acetaminophen, which is Tylenol. Too much acetaminophen (Tylenol) can be harmful. · Take an over-the-counter cough medicine that contains dextromethorphan to help quiet a dry, hacking cough so that you can sleep. Avoid cough medicines that have more than one active ingredient. Read and follow all instructions on the label. · Breathe moist air from a humidifier, hot shower, or sink filled with hot water. The heat and moisture will thin mucus so you can cough it out. · Do not smoke. Smoking can make bronchitis worse. If you need help quitting, talk to your doctor about stop-smoking programs and medicines. These can increase your chances of quitting for good. When should you call for help? Call 911 anytime you think you may need emergency care. For example, call if:    · You have severe trouble breathing.    Call your doctor now or seek immediate medical care if:    · You have new or worse trouble breathing.     · You cough up dark brown or bloody mucus (sputum).     · You have a new or higher fever.     · You have a new rash.    Watch closely for changes in your health, and be sure to contact your doctor if:    · You cough more deeply or more often, especially if you notice more mucus or a change in the color of your mucus.     · You are not getting better as expected. Where can you learn more? Go to http://darek-esther.info/. Enter H333 in the search box to learn more about \"Bronchitis: Care Instructions. \"  Current as of: September 5, 2018  Content Version: 11.9  © 1958-1858 [employer].  Care instructions adapted under license by [employer] (which disclaims liability or warranty for this information). If you have questions about a medical condition or this instruction, always ask your healthcare professional. [address] any warranty or liability for your use of this information. Learning About Asthma Triggers  What are asthma triggers? When you have asthma, certain things can make your symptoms worse. These are called triggers. Learn what triggers an asthma attack for you, and avoid the triggers when you can. Common triggers include colds, smoke, air pollution, dust, pollen, pets, stress, and cold air. How do asthma triggers affect you? Triggers can make it harder for your lungs to work as they should. They can lead to sudden breathing problems and other symptoms. When you are around a trigger, an asthma attack is more likely. If your symptoms are severe, you may need emergency treatment or have to go to the hospital for treatment. What can you do to avoid triggers? The first thing is to know your triggers. When you are having symptoms, note the things around you that might be causing them. Then look for patterns that may be triggering your symptoms. Record your triggers on a piece of paper or in an asthma diary. When you have your list of possible triggers, work with your doctor to find ways to avoid them. Avoid colds and flu. Get a pneumococcal vaccine shot. If you have had one before, ask your doctor whether you need a second dose. Get a flu vaccine every year, as soon as it's available. If you must be around people with colds or the flu, wash your hands often. Here are some ways to avoid a few common triggers. · Do not smoke or allow others to smoke around you. If you need help quitting, talk to your doctor about stop-smoking programs and medicines. These can increase your chances of quitting for good. · If there is a lot of pollution, pollen, or dust outside, stay at home and keep your windows closed. Use an air conditioner or air filter in your home. Check your local weather report or newspaper for air quality and pollen reports. What else should you know? · Take your controller medicine every day, not just when you have symptoms. It helps prevent problems before they occur. · Your doctor may suggest that you check how well your lungs are working by measuring your peak expiratory flow (PEF) throughout the day. Your PEF may drop when you are near things that trigger symptoms. Where can you learn more? Go to http://darek-esther.info/. Enter M876 in the search box to learn more about \"Learning About Asthma Triggers. \"  Current as of: September 5, 2018  Content Version: 11.9  © 8781-8327 [employer]. Care instructions adapted under license by [employer] (which disclaims liability or warranty for this information). If you have questions about a medical condition or this instruction, always ask your healthcare professional. [address] any warranty or liability for your use of this information.

## 2019-05-10 ENCOUNTER — TELEPHONE (OUTPATIENT)
Dept: INTERNAL MEDICINE CLINIC | Age: 39
End: 2019-05-10

## 2019-05-10 NOTE — TELEPHONE ENCOUNTER
pt is requesting an extra day off work because she is still feeling bad. Needs today off included in work note. Wants to be called when work note is ready for . Spoke w/ pt verified name /. States she is still not feeling well and coughing alot. Informed pt that she needs to complete antibiotic dosing before getting re-assessd and give it more time to recover. Informed to take antibiotic, benzonatate cough Perles, mucinex, and plenty of water.   She verbalized understanding andm agreed with plan

## 2019-05-10 NOTE — TELEPHONE ENCOUNTER
----- Message from Eric Jenkins sent at 5/10/2019  7:35 AM EDT -----  Regarding: NP Gunnar Berger  /  Telephone  Patient was seen in the practice on 5/8/19 for sore throat, coughing and dizziness. Patient's symptoms have not improved and is supposed to return to work today. She is requesting an additional day off from work along with a doctor's note. Would like for the nurse to return her call to determine if she needs to be seen again. Best contact number is [phone].

## 2019-05-17 PROBLEM — E66.01 SEVERE OBESITY (HCC): Status: ACTIVE | Noted: 2019-05-17

## 2019-06-07 DIAGNOSIS — E05.90 HYPERTHYROIDISM: ICD-10-CM

## 2019-06-07 RX ORDER — METHIMAZOLE 10 MG/1
TABLET ORAL
Qty: 180 TAB | Refills: 0 | Status: SHIPPED | OUTPATIENT
Start: 2019-06-07 | End: 2019-11-19 | Stop reason: SDUPTHER

## 2019-06-17 ENCOUNTER — TELEPHONE (OUTPATIENT)
Dept: ENDOCRINOLOGY | Age: 39
End: 2019-06-17

## 2019-06-17 NOTE — TELEPHONE ENCOUNTER
Spoke with patient. She states that she is going to bring FMLA paperwork to complete on July 1 at her appointment. She states that she needs the provider portion to indicate that she needs appointments every 3 months (or whenever  wants to see her) for job security. She also states that she has been fatigued, not sleeping well and has job/home stress. Advised that PCP would need to complete a different FMLA form. Patient expressed understanding.

## 2019-06-17 NOTE — TELEPHONE ENCOUNTER
----- Message from [facility] sent at 6/17/2019  1:36 PM EDT -----  Regarding: Dr. Estella Allan: [phone]  Pt is requesting a call back to discuss [facility] paperwork.

## 2019-06-25 ENCOUNTER — OFFICE VISIT (OUTPATIENT)
Dept: INTERNAL MEDICINE CLINIC | Age: 39
End: 2019-06-25

## 2019-06-25 VITALS
WEIGHT: 198 LBS | TEMPERATURE: 98.7 F | OXYGEN SATURATION: 100 % | SYSTOLIC BLOOD PRESSURE: 122 MMHG | RESPIRATION RATE: 18 BRPM | HEIGHT: 60 IN | HEART RATE: 72 BPM | BODY MASS INDEX: 38.87 KG/M2 | DIASTOLIC BLOOD PRESSURE: 82 MMHG

## 2019-06-25 DIAGNOSIS — M54.2 NECK PAIN: Primary | ICD-10-CM
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
## 2019-06-25 DIAGNOSIS — M54.6 ACUTE BILATERAL THORACIC BACK PAIN: ICD-10-CM

## 2019-06-25 DIAGNOSIS — M54.50 ACUTE BILATERAL LOW BACK PAIN WITHOUT SCIATICA: ICD-10-CM

## 2019-06-25 RX ORDER — IBUPROFEN 600 MG/1
TABLET ORAL
COMMUNITY
End: 2019-06-25 | Stop reason: SDUPTHER

## 2019-06-25 RX ORDER — ONDANSETRON 4 MG/1
4 TABLET, FILM COATED ORAL
COMMUNITY
End: 2019-11-11 | Stop reason: ALTCHOICE

## 2019-06-25 RX ORDER — IBUPROFEN 600 MG/1
600 TABLET ORAL
Qty: 30 TAB | Refills: 0 | Status: SHIPPED | OUTPATIENT
Start: 2019-06-25 | End: 2021-02-05
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
## 2019-06-25 NOTE — PATIENT INSTRUCTIONS
Neck: Exercises  Your Care Instructions  Here are some examples of typical rehabilitation exercises for your condition. Start each exercise slowly. Ease off the exercise if you start to have pain. Your doctor or physical therapist will tell you when you can start these exercises and which ones will work best for you. How to do the exercises  Neck stretch    1. This stretch works best if you keep your shoulder down as you lean away from it. To help you remember to do this, start by relaxing your shoulders and lightly holding on to your thighs or your chair. 2. Tilt your head toward your shoulder and hold for 15 to 30 seconds. Let the weight of your head stretch your muscles. 3. If you would like a little added stretch, use your hand to gently and steadily pull your head toward your shoulder. For example, keeping your right shoulder down, lean your head to the left. 4. Repeat 2 to 4 times toward each shoulder. Diagonal neck stretch    1. Turn your head slightly toward the direction you will be stretching, and tilt your head diagonally toward your chest and hold for 15 to 30 seconds. 2. If you would like a little added stretch, use your hand to gently and steadily pull your head forward on the diagonal.  3. Repeat 2 to 4 times toward each side. Dorsal glide stretch    1. Sit or stand tall and look straight ahead. 2. Slowly tuck your chin as you glide your head backward over your body  3. Hold for a count of 6, and then relax for up to 10 seconds. 4. Repeat 8 to 12 times. Chest and shoulder stretch    1. Sit or stand tall and glide your head backward as in the dorsal glide stretch. 2. Raise both arms so that your hands are next to your ears. 3. Take a deep breath, and as you breathe out, lower your elbows down and behind your back. You will feel your shoulder blades slide down and together, and at the same time you will feel a stretch across your chest and the front of your shoulders.   4. Hold for about 6 seconds, and then relax for up to 10 seconds. 5. Repeat 8 to 12 times. Strengthening: Hands on head    1. Move your head backward, forward, and side to side against gentle pressure from your hands, holding each position for about 6 seconds. 2. Repeat 8 to 12 times. Follow-up care is a key part of your treatment and safety. Be sure to make and go to all appointments, and call your doctor if you are having problems. It's also a good idea to know your test results and keep a list of the medicines you take. Where can you learn more? Go to http://darek-esther.info/. Enter P975 in the search box to learn more about \"Neck: Exercises. \"  Current as of: September 20, 2018  Content Version: 11.9  © 5736-9970 [employer]. Care instructions adapted under license by [employer] (which disclaims liability or warranty for this information). If you have questions about a medical condition or this instruction, always ask your healthcare professional. [place] [address] any warranty or liability for your use of this information. Learning About How to Have a Healthy Back  What causes back pain? Back pain is often caused by overuse, strain, or injury. For example, people often hurt their backs playing sports or working in the yard, being jolted in a car accident, or lifting something too heavy. Aging plays a part too. Your bones and muscles tend to lose strength as you age, which makes injury more likely. The spongy discs between the bones of the spine (vertebrae) may suffer from wear and tear and no longer provide enough cushion between the bones. A disc that bulges or breaks open (herniated disc) can press on nerves, causing back pain. In some people, back pain is the result of arthritis, broken vertebrae caused by bone loss (osteoporosis), illness, or a spine problem.   Although most people have back pain at one time or another, there are steps you can take to make it less likely. How can you have a healthy back? Reduce stress on your back through good posture  Slumping or slouching alone may not cause low back pain. But after the back has been strained or injured, bad posture can make pain worse. · Sleep in a position that maintains your back's normal curves and on a mattress that feels comfortable. Sleep on your side with a pillow between your knees, or sleep on your back with a pillow under your knees. These positions can reduce strain on your back. · Stand and sit up straight. \"Good posture\" generally means your ears, shoulders, and hips are in a straight line. · If you must stand for a long time, put one foot on a stool, ledge, or box. Switch feet every now and then. · Sit in a chair that is low enough to let you place both feet flat on the floor with both knees nearly level with your hips. If your chair or desk is too high, use a footrest to raise your knees. Place a small pillow, a rolled-up towel, or a lumbar roll in the curve of your back if you need extra support. · Try a kneeling chair, which helps tilt your hips forward. This takes pressure off your lower back. · Try sitting on an exercise ball. It can rock from side to side, which helps keep your back loose. · When driving, keep your knees nearly level with your hips. Sit straight, and drive with both hands on the steering wheel. Your arms should be in a slightly bent position. Reduce stress on your back through careful lifting  · Squat down, bending at the hips and knees only. If you need to, put one knee to the floor and extend your other knee in front of you, bent at a right angle (half kneeling). · Press your chest straight forward. This helps keep your upper back straight while keeping a slight arch in your low back. · Hold the load as close to your body as possible, at the level of your belly button (navel).   · Use your feet to change direction, taking small steps.  · Lead with your hips as you change direction. Keep your shoulders in line with your hips as you move. · Set down your load carefully, squatting with your knees and hips only. Exercise and stretch your back  · Do some exercise on most days of the week, if your doctor says it is okay. You can walk, run, swim, or cycle. · Stretch your back muscles. Here are a few exercises to try:  ? Lie on your back, and gently pull one bent knee to your chest. Put that foot back on the floor, and then pull the other knee to your chest.  ? Do pelvic tilts. Lie on your back with your knees bent. Tighten your stomach muscles. Pull your belly button (navel) in and up toward your ribs. You should feel like your back is pressing to the floor and your hips and pelvis are slightly lifting off the floor. Hold for 6 seconds while breathing smoothly. ? Sit with your back flat against a wall. · Keep your core muscles strong. The muscles of your back, belly (abdomen), and buttocks support your spine. ? Pull in your belly and imagine pulling your navel toward your spine. Hold this for 6 seconds, then relax. Remember to keep breathing normally as you tense your muscles. ? Do curl-ups. Always do them with your knees bent. Keep your low back on the floor, and curl your shoulders toward your knees using a smooth, slow motion. Keep your arms folded across your chest. If this bothers your neck, try putting your hands behind your neck (not your head), with your elbows spread apart. ? Lie on your back with your knees bent and your feet flat on the floor. Tighten your belly muscles, and then push with your feet and raise your buttocks up a few inches. Hold this position 6 seconds as you continue to breathe normally, then lower yourself slowly to the floor. Repeat 8 to 12 times. ? If you like group exercise, try Pilates or yoga. These classes have poses that strengthen the core muscles.   Lead a healthy lifestyle  · Stay at a healthy weight to avoid strain on your back. · Do not smoke. Smoking increases the risk of osteoporosis, which weakens the spine. If you need help quitting, talk to your doctor about stop-smoking programs and medicines. These can increase your chances of quitting for good. Where can you learn more? Go to http://darek-esther.info/. Enter L315 in the search box to learn more about \"Learning About How to Have a Healthy Back. \"  Current as of: September 20, 2018  Content Version: 11.9  © 1461-7318 [employer]. Care instructions adapted under license by [employer] (which disclaims liability or warranty for this information). If you have questions about a medical condition or this instruction, always ask your healthcare professional. [address] any warranty or liability for your use of this information.

## 2019-06-25 NOTE — LETTER
NOTIFICATION RETURN TO WORK / SCHOOL 
 
6/25/2019 11:55 AM 
 
Ms. Kenya Bennett 
[address] [facility] [address] [address] To Whom It May Concern: 
 
Kenya Bennett is currently under the care of Zandra. She will return to work/school on: July 1, 2019 If there are questions or concerns please have the patient contact our office. Sincerely, Hollie Berger NP

## 2019-07-01 ENCOUNTER — OFFICE VISIT (OUTPATIENT)
Dept: ENDOCRINOLOGY | Age: 39
End: 2019-07-01

## 2019-07-01 ENCOUNTER — DOCUMENTATION ONLY (OUTPATIENT)
Dept: INTERNAL MEDICINE CLINIC | Age: 39
End: 2019-07-01

## 2019-07-01 VITALS
SYSTOLIC BLOOD PRESSURE: 141 MMHG | WEIGHT: 197 LBS | HEIGHT: 60 IN | HEART RATE: 94 BPM | OXYGEN SATURATION: 99 % | RESPIRATION RATE: 16 BRPM | TEMPERATURE: 96.4 F | BODY MASS INDEX: 38.68 KG/M2 | DIASTOLIC BLOOD PRESSURE: 79 MMHG

## 2019-07-01 DIAGNOSIS — E05.90 HYPERTHYROIDISM: Primary | ICD-10-CM

## 2019-07-01 RX ORDER — DICLOFENAC SODIUM 75 MG/1
TABLET, DELAYED RELEASE ORAL
Refills: 1 | COMMUNITY
Start: 2019-06-15 | End: 2021-02-05

## 2019-07-01 NOTE — PROGRESS NOTES
Bob Hernnadez is a 44 y.o. female     Chief Complaint   Patient presents with    Thyroid Problem     follow up       Visit Vitals  /79 (BP 1 Location: Left arm, BP Patient Position: Sitting)   Pulse 94   Temp 96.4 °F (35.8 °C) (Oral)   Resp 16   Ht 5' (1.524 m)   Wt 197 lb (89.4 kg)   SpO2 99%   BMI 38.47 kg/m²       Health Maintenance Due   Topic Date Due    Pneumococcal 0-64 years (1 of 1 - PPSV23) 05/08/1986    DTaP/Tdap/Td series (1 - Tdap) 05/08/2001    PAP AKA CERVICAL CYTOLOGY  05/08/2001       1. Have you been to the ER, urgent care clinic since your last visit? Hospitalized since your last visit? No    2. Have you seen or consulted any other health care providers outside of the [address] [place] [address] since your last visit? Include any pap smears or colon screening.  No

## 2019-07-01 NOTE — PROGRESS NOTES
Blank Attending Physician Statement received via fax from the [address] [place] [address]. Form scanned into cc and placed in provider's bin.
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
## 2019-07-02 DIAGNOSIS — E05.90 HYPERTHYROIDISM: Primary | ICD-10-CM

## 2019-07-02 LAB
T3 SERPL-MCNC: 83 NG/DL (ref 71–180)
T4 FREE SERPL-MCNC: 1.22 NG/DL (ref 0.82–1.77)
TSH SERPL DL<=0.005 MIU/L-ACNC: 0.02 UIU/ML (ref 0.45–4.5)

## 2019-07-02 NOTE — PROGRESS NOTES
Form reviewed. She was seen by ADEEL Tomlinson on 6-25-19. Released to return to work on 7-1-19. I have not seen her since 2016. Form put in Ms. Scott's box to review when she returns. Please clarify if release needed from pt, as no release included with requested paperwork/form.

## 2019-07-03 NOTE — PROGRESS NOTES
Notified patient of Dr. Jannet Valenzuela recommendations below. Patient states that Mrs. Cristiano Harris told her to start therapy and then to make an appt to see Dr. Rober Moody on 7/2/19 and have form completed. Patient states when she called she was told that Dr. Rober Moody did not have any openings until 7/8/19. Patient scheduled appt with Dr. Rober Moody on 7/8/19. Patient is confused as to what she needs to do. Please advise [phone].

## 2019-07-05 NOTE — PROGRESS NOTES
Spoke with pt , after verifying pt name and . Went over what Dr. Octavio Hall had advised. Pt agreeable . Pt stated she would like kam written that form was not able to be completed on  . Pt stated she will get the letter when she comes to her appt. On 19. Answered any and all questions pt had, pt voiced understanding.

## 2019-07-05 NOTE — PROGRESS NOTES
Please review below with pt: At this time, unable to review provider's note from 6-25-19 visit. Can review directly with Ms. Nohemy Figueroa on 7-8-19 when she returns to office. At this time, would recommend she keep 7-8-19 appt and will complete form then. If she needs letter indicating provider she saw on 6-25 out and unable to complete form until 7-8-19, we can have letter/notation for pt documenting this today. Future Appointments   Date Time Provider Karen Moody   7/8/2019 11:30 AM Ricarda Medina MD [facility]   10/2/2019  3:50 PM Galindo Cazares MD [facility] [address]     I have form in my office/inbox currently.

## 2019-07-08 ENCOUNTER — OFFICE VISIT (OUTPATIENT)
Dept: INTERNAL MEDICINE CLINIC | Age: 39
End: 2019-07-08

## 2019-07-08 VITALS
DIASTOLIC BLOOD PRESSURE: 84 MMHG | HEIGHT: 60 IN | SYSTOLIC BLOOD PRESSURE: 141 MMHG | HEART RATE: 110 BPM | OXYGEN SATURATION: 98 % | BODY MASS INDEX: 39.07 KG/M2 | WEIGHT: 199 LBS | RESPIRATION RATE: 16 BRPM | TEMPERATURE: 98.9 F

## 2019-07-08 DIAGNOSIS — S06.0X0A CONCUSSION WITHOUT LOSS OF CONSCIOUSNESS, INITIAL ENCOUNTER: ICD-10-CM

## 2019-07-08 DIAGNOSIS — M54.6 ACUTE BILATERAL THORACIC BACK PAIN: ICD-10-CM
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
## 2019-07-08 DIAGNOSIS — Z02.89 ENCOUNTER FOR COMPLETION OF FORM WITH PATIENT: ICD-10-CM

## 2019-07-08 DIAGNOSIS — M54.50 ACUTE BILATERAL LOW BACK PAIN WITHOUT SCIATICA: ICD-10-CM

## 2019-07-08 DIAGNOSIS — M54.2 NECK PAIN: Primary | ICD-10-CM

## 2019-07-08 RX ORDER — METHOCARBAMOL 750 MG/1
TABLET, FILM COATED ORAL
Refills: 0 | COMMUNITY
Start: 2019-06-21 | End: 2019-07-08 | Stop reason: SDUPTHER

## 2019-07-08 RX ORDER — METHOCARBAMOL 500 MG/1
250-500 TABLET, FILM COATED ORAL
Qty: 40 TAB | Refills: 1 | Status: SHIPPED | OUTPATIENT
Start: 2019-07-08 | End: 2019-10-11 | Stop reason: ALTCHOICE

## 2019-07-08 RX ORDER — LIDOCAINE 50 MG/G
PATCH TOPICAL
Refills: 0 | COMMUNITY
Start: 2019-06-21 | End: 2019-08-29 | Stop reason: SDUPTHER

## 2019-07-08 NOTE — PROGRESS NOTES
Disability forms completed and faxed to [employer] -  # [phone] . Original and a copy given to pt at today's visit. Scanned in [facility].

## 2019-07-08 NOTE — PATIENT INSTRUCTIONS
1.  Please have the name of the concussion provider for new referral faxed to [phone], as reviewed/requested. Once you see the concussion specialist, they will likely complete future paperwork related to when you can return to full duty at work. If you need concussion specialist through [facility], we have as neurologists through [facility] or [facility], or through Sports Medicine as reviewed: [address], [address], [address] [facility], [place] [place] Final Phone [phone] Octaviano Mcgraw MD, CAQSM, RMSK 2. Please follow the following instructions to process/authorize your referral, if needed: 
 
Referrals processing Please verify with your insurance IF you need referral authorization submitted. For insurance plans which require this, please follow the following steps. FAILURE TO DO SO MAY RESULT IN INABILITY TO SEE THE SPECIALIST YOU HAVE BEEN REFERRED TO (once you are scheduled to see them). 1. Call and schedule appointment with specialist 
2. Call our clinic and leave message with provider name, and date of appointment 3. We will then submit the referral to your insurance. This process takes 2-5 business days. If you have questions about scheduling or authorizing referral, you can review with our referral coordinator Nicoletta Nageotte). You can review with her today if available/if you have time, or you can call to review once you have made your referral/appointment. If you are not sure if you need referral authorizations, please review with the referral coordinator(s), either prior to or after you have made the appointment, as reviewed.
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
## 2019-07-08 NOTE — PROGRESS NOTES
RM 16    Patient not fasting at this time. Patient participates in PT. Therapy waiting on occlusions specialist.     Chief Complaint   Patient presents with    Concussion     f/u      1. Have you been to the ER, urgent care clinic since your last visit? Hospitalized since your last visit? No    2. Have you seen or consulted any other health care providers outside of the [address] [place] [address] since your last visit? Include any pap smears or colon screening. Yes Reason for visit: 7/1/19, [facility], hyperthyroid    Health Maintenance Due   Topic Date Due    Pneumococcal 0-64 years (1 of 1 - PPSV23) 05/08/1986    DTaP/Tdap/Td series (1 - Tdap) 05/08/2001    PAP AKA CERVICAL CYTOLOGY  05/08/2001     Abuse Screening Questionnaire 7/8/2019   Do you ever feel afraid of your partner? N   Are you in a relationship with someone who physically or mentally threatens you? N   Is it safe for you to go home?  Y       3 most recent PHQ Screens 7/8/2019   Little interest or pleasure in doing things Not at all   Feeling down, depressed, irritable, or hopeless Not at all   Total Score PHQ 2 0     Learning Assessment 7/8/2019   PRIMARY LEARNER Patient   HIGHEST LEVEL OF EDUCATION - PRIMARY LEARNER  -   BARRIERS PRIMARY LEARNER NONE   PRIMARY LANGUAGE ENGLISH   LEARNER PREFERENCE PRIMARY READING     DEMONSTRATION   ANSWERED BY patient    RELATIONSHIP SELF

## 2019-07-12 ENCOUNTER — DOCUMENTATION ONLY (OUTPATIENT)
Dept: INTERNAL MEDICINE CLINIC | Age: 39
End: 2019-07-12

## 2019-07-12 NOTE — PROGRESS NOTES
Patient dropped off Authorization for Provider's Release of Medical Records to [facility].  Form scanned into [employer]

## 2019-08-02 ENCOUNTER — TELEPHONE (OUTPATIENT)
Dept: INTERNAL MEDICINE CLINIC | Age: 39
End: 2019-08-02

## 2019-08-13 DIAGNOSIS — E05.90 HYPERTHYROIDISM: ICD-10-CM

## 2019-08-29 ENCOUNTER — OFFICE VISIT (OUTPATIENT)
Dept: INTERNAL MEDICINE CLINIC | Age: 39
End: 2019-08-29

## 2019-08-29 VITALS
TEMPERATURE: 98.3 F | BODY MASS INDEX: 39.27 KG/M2 | HEIGHT: 60 IN | SYSTOLIC BLOOD PRESSURE: 164 MMHG | HEART RATE: 86 BPM | DIASTOLIC BLOOD PRESSURE: 80 MMHG | WEIGHT: 200 LBS | RESPIRATION RATE: 14 BRPM | OXYGEN SATURATION: 98 %

## 2019-08-29 DIAGNOSIS — S06.0X0A CONCUSSION WITHOUT LOSS OF CONSCIOUSNESS, INITIAL ENCOUNTER: ICD-10-CM
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
## 2019-08-29 DIAGNOSIS — M54.50 ACUTE BILATERAL LOW BACK PAIN WITHOUT SCIATICA: ICD-10-CM

## 2019-08-29 DIAGNOSIS — M54.6 ACUTE BILATERAL THORACIC BACK PAIN: Primary | ICD-10-CM

## 2019-08-29 RX ORDER — LIDOCAINE 50 MG/G
PATCH TOPICAL
Qty: 30 EACH | Refills: 0 | Status: SHIPPED | OUTPATIENT
Start: 2019-08-29 | End: 2021-02-05

## 2019-08-29 NOTE — PROGRESS NOTES
RM 17    Patient states medicaid effective on 9/1/19, request referral for Dr. Reji Mae. Patient request paper work for disability claim be completed by 9/3/19 for employer. Chief Complaint   Patient presents with    Concussion     3 week follow up     Physical Therapy     follow up      1. Have you been to the ER, urgent care clinic since your last visit? Hospitalized since your last visit? No    2. Have you seen or consulted any other health care providers outside of the [address] [place] [address] since your last visit? Include any pap smears or colon screening. No    Health Maintenance Due   Topic Date Due    Pneumococcal 0-64 years (1 of 1 - PPSV23) 05/08/1986    DTaP/Tdap/Td series (1 - Tdap) 05/08/2001    PAP AKA CERVICAL CYTOLOGY  05/08/2001    Influenza Age 5 to Adult  08/01/2019     Abuse Screening Questionnaire 8/29/2019   Do you ever feel afraid of your partner? N   Are you in a relationship with someone who physically or mentally threatens you? N   Is it safe for you to go home?  Y     3 most recent PHQ Screens 8/29/2019   Little interest or pleasure in doing things Not at all   Feeling down, depressed, irritable, or hopeless Not at all   Total Score PHQ 2 0
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
## 2019-09-10 ENCOUNTER — OFFICE VISIT (OUTPATIENT)
Dept: INTERNAL MEDICINE CLINIC | Age: 39
End: 2019-09-10

## 2019-09-10 VITALS
TEMPERATURE: 98 F | DIASTOLIC BLOOD PRESSURE: 84 MMHG | WEIGHT: 202 LBS | BODY MASS INDEX: 39.66 KG/M2 | RESPIRATION RATE: 16 BRPM | OXYGEN SATURATION: 97 % | HEART RATE: 92 BPM | SYSTOLIC BLOOD PRESSURE: 123 MMHG | HEIGHT: 60 IN

## 2019-09-10 DIAGNOSIS — M62.830 BACK SPASM: ICD-10-CM

## 2019-09-10 DIAGNOSIS — S06.0X0A CONCUSSION WITHOUT LOSS OF CONSCIOUSNESS, INITIAL ENCOUNTER: Primary | ICD-10-CM

## 2019-09-10 RX ORDER — PREDNISONE 20 MG/1
20 TABLET ORAL 3 TIMES DAILY
Qty: 21 TAB | Refills: 0 | Status: SHIPPED | OUTPATIENT
Start: 2019-09-10 | End: 2019-09-17

## 2019-09-11 ENCOUNTER — DOCUMENTATION ONLY (OUTPATIENT)
Dept: INTERNAL MEDICINE CLINIC | Age: 39
End: 2019-09-11

## 2019-09-11 NOTE — PROGRESS NOTES
Writer spoke to patient after verifying name and  regarding FMLA forms. Writer informed patient FMLA forms (Attending provider statement) has been faxed. Patient states employer faxed another form with questions and she needs that form completed. Writer informed patient no form with additional questions received at this time. Writer provided new fax number of [phone] to patient and patient's employer to have employer refax form that needs completion. Message also left on patient's employer voicemail [phone]) with information. Awaiting fax/ returned called.

## 2019-09-13 ENCOUNTER — HOSPITAL ENCOUNTER (OUTPATIENT)
Dept: PHYSICAL THERAPY | Age: 39
Discharge: HOME | End: 2019-09-13
Payer: MEDICAID

## 2019-09-13 PROCEDURE — 97535 SELF CARE MNGMENT TRAINING: CPT | Performed by: PHYSICAL THERAPIST

## 2019-09-13 PROCEDURE — 97161 PT EVAL LOW COMPLEX 20 MIN: CPT | Performed by: PHYSICAL THERAPIST

## 2019-09-13 PROCEDURE — 97014 ELECTRIC STIMULATION THERAPY: CPT | Performed by: PHYSICAL THERAPIST
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
## 2019-09-19 ENCOUNTER — HOSPITAL ENCOUNTER (OUTPATIENT)
Dept: PHYSICAL THERAPY | Age: 39
Discharge: HOME | End: 2019-09-19
Payer: MEDICAID

## 2019-09-19 PROCEDURE — 97112 NEUROMUSCULAR REEDUCATION: CPT | Performed by: PHYSICAL THERAPIST

## 2019-09-19 PROCEDURE — 97110 THERAPEUTIC EXERCISES: CPT | Performed by: PHYSICAL THERAPIST

## 2019-09-19 PROCEDURE — 97014 ELECTRIC STIMULATION THERAPY: CPT | Performed by: PHYSICAL THERAPIST

## 2019-09-19 NOTE — PROGRESS NOTES
PT DAILY TREATMENT NOTE 2-15    Patient Name: Brianne Shaffer  Date:2019  : 1980  [x]  Patient  Verified  Payor: [place] / Plan: 1500  / Product Type: Medicaid /    In time:835a  Out time:940a  Total Treatment Time (min): 65  Visit #:  2    Treatment Area: Low back pain [M54.5]  Neck pain [M54.2]  Consciousness loss of (HCC) [R40.20]    SUBJECTIVE  Pain Level (0-10 scale): 5/10  Any medication changes, allergies to medications, adverse drug reactions, diagnosis change, or new procedure performed?: [x] No    [] Yes (see summary sheet for update)  Subjective functional status/changes:   [] No changes reported  Pt reports that her neck and back are still sore.       OBJECTIVE    Modality rationale: decrease pain to improve the patients ability to maintain core stabilization with activity   Min Type Additional Details      15 [x] Estim: []Att   [x]Unatt    []TENS instruct                  []IFC  [x]Premod   []NMES                     []Other:  []w/US   []w/ice   [x]w/heat  Position: supine  Location: neck       []  Traction: [] Cervical       []Lumbar                       [] Prone          []Supine                       []Intermittent   []Continuous Lbs:  [] before manual  [] after manual  []w/heat    []  Ultrasound: []Continuous   [] Pulsed                       at: []1MHz   []3MHz Location:  W/cm2:    [] Paraffin         Location:   []w/heat    []  Ice     []  Heat  []  Ice massage Position:  Location:    []  Laser  []  Other: Position:  Location:      []  Vasopneumatic Device Pressure:       [] lo [] med [] hi   Temperature:      [x] Skin assessment post-treatment:  [x]intact []redness- no adverse reaction    []redness  adverse reaction:         40 min Therapeutic Exercise:  [x] See flow sheet :    Rationale: increase ROM, increase strength, improve coordination, improve balance and increase proprioception to improve the patients ability to maintain core stabilization with activity       10 min Neuromuscular Re-education:  []  See flow sheet : review of current status and addition of HEP   Rationale: increase ROM, increase strength, improve coordination, improve balance and increase proprioception  to improve the patients ability to resume normal performance      With   [] TE   [] TA   [x] neuro   [] other: Patient Education: [x] Review HEP    [] Progressed/Changed HEP based on:   [] positioning   [] body mechanics   [] transfers   [] heat/ice application    [] other:      Other Objective/Functional Measures: gaze stabilization dizziness before 60 bpm     Pain Level (0-10 scale) post treatment: 2/10    ASSESSMENT/Changes in Function:   Began therex for cervical and lumbar muscle performance. Difficulty with visual-vestibular training today coordinating the movement. Reported dizziness during training. Patient will continue to benefit from skilled PT services to modify and progress therapeutic interventions, address functional mobility deficits, address ROM deficits, address strength deficits, analyze and address soft tissue restrictions, analyze and cue movement patterns, analyze and modify body mechanics/ergonomics, assess and modify postural abnormalities and address imbalance/dizziness to attain remaining goals. []  See Plan of Care  []  See progress note/recertification  []  See Discharge Summary         Progress towards goals / Updated goals:  Short Term Goals: To be accomplished in 6-8 treatments:  1) Pt will be independent with HEP  2) Pt will be able to sit with upright posture >/= 5 minutes without increase of symptoms  3) Pt will report improvement in cervical Rotation to look over shoulders while driving. 4) Pt will be able to perform VOR x 1 at 100 bpm in standing without reproduction of symptoms     Long Term Goals:  To be accomplished in 10-16 treatments:  1) Pt will be able to read without increase of neck pain  2) Pt will be able to look over shoulders while driving without increase of neck pain  3) Pt will demonstrate ability to lift >/= 20 lbs without increase of symptoms  4) Pt will be able to sleep through the night without waking in pain  5) Pt will be able to rise from supine to sitting without head lag or pain.   6) Perform Gaze Stabilization x1 while walking for >/= 60 seconds without symptoms  7) Perform Gaze Stabilization x2 while walking for >/= 60 seconds without symptoms  8) Perform Saccades >/= 60 seconds without symptoms  9) Tolerate visual conflict while walking without symptoms  10) Tolerate return to physical activity protocol without symptoms       PLAN  [x]  Upgrade activities as tolerated     [x]  Continue plan of care  []  Update interventions per flow sheet       []  Discharge due to:_  []  Other:_      Luis A Boyce PT, DPT 9/19/2019

## 2019-09-24 ENCOUNTER — HOSPITAL ENCOUNTER (OUTPATIENT)
Dept: PHYSICAL THERAPY | Age: 39
Discharge: HOME | End: 2019-09-24
Payer: MEDICAID

## 2019-09-24 PROCEDURE — 97014 ELECTRIC STIMULATION THERAPY: CPT | Performed by: PHYSICAL THERAPIST

## 2019-09-24 PROCEDURE — 97110 THERAPEUTIC EXERCISES: CPT | Performed by: PHYSICAL THERAPIST

## 2019-09-24 NOTE — PROGRESS NOTES
PT DAILY TREATMENT NOTE 2-15    Patient Name: Catie Reynoso  Date:2019  : 1980  [x]  Patient  Verified  Payor: [place] / Plan: 1500 / Product Type: Medicaid /    In time: 840a Out time:945a  Total Treatment Time (min): 60  Visit #:  3    Treatment Area: Low back pain [M54.5]  Neck pain [M54.2]  Consciousness loss of (HCC) [R40.20]    SUBJECTIVE  Pain Level (0-10 scale): 5/10  Any medication changes, allergies to medications, adverse drug reactions, diagnosis change, or new procedure performed?: [x] No    [] Yes (see summary sheet for update)  Subjective functional status/changes:   [] No changes reported  Pt reports that the headaches and the neck and back pain are still giving her the biggest problem.   She does still feel an occasional dizziness with turning too fast.    OBJECTIVE    Modality rationale: decrease pain to improve the patients ability to maintain core stabilization with activity   Min Type Additional Details      15 [x] Estim: []Att   [x]Unatt    []TENS instruct                  []IFC  [x]Premod   []NMES                     []Other:  []w/US   []w/ice   [x]w/heat  Position: supine  Location: neck       []  Traction: [] Cervical       []Lumbar                       [] Prone          []Supine                       []Intermittent   []Continuous Lbs:  [] before manual  [] after manual  []w/heat    []  Ultrasound: []Continuous   [] Pulsed                       at: []1MHz   []3MHz Location:  W/cm2:    [] Paraffin         Location:   []w/heat    []  Ice     []  Heat  []  Ice massage Position:  Location:    []  Laser  []  Other: Position:  Location:      []  Vasopneumatic Device Pressure:       [] lo [] med [] hi   Temperature:      [x] Skin assessment post-treatment:  [x]intact []redness- no adverse reaction    []redness  adverse reaction:         45 min Therapeutic Exercise:  [x] See flow sheet :    Rationale: increase ROM, increase strength, improve coordination, improve balance and increase proprioception to improve the patients ability to maintain core stabilization with activity           With   [] TE   [] TA   [x] neuro   [] other: Patient Education: [x] Review HEP    [] Progressed/Changed HEP based on:   [] positioning   [] body mechanics   [] transfers   [] heat/ice application    [] other:      Other Objective/Functional Measures:-     Pain Level (0-10 scale) post treatment: 4/10    ASSESSMENT/Changes in Function:   Added total gym squats today./  Reports that this made her slightly dizzy but resolved when she stood up. Patient will continue to benefit from skilled PT services to modify and progress therapeutic interventions, address functional mobility deficits, address ROM deficits, address strength deficits, analyze and address soft tissue restrictions, analyze and cue movement patterns, analyze and modify body mechanics/ergonomics, assess and modify postural abnormalities and address imbalance/dizziness to attain remaining goals. []  See Plan of Care  []  See progress note/recertification  []  See Discharge Summary         Progress towards goals / Updated goals:  Short Term Goals: To be accomplished in 6-8 treatments:  1) Pt will be independent with HEP Progressing  2) Pt will be able to sit with upright posture >/= 5 minutes without increase of symptoms MET  3) Pt will report improvement in cervical Rotation to look over shoulders while driving. 4) Pt will be able to perform VOR x 1 at 100 bpm in standing without reproduction of symptoms     Long Term Goals:  To be accomplished in 10-16 treatments:  1) Pt will be able to read without increase of neck pain  2) Pt will be able to look over shoulders while driving without increase of neck pain  3) Pt will demonstrate ability to lift >/= 20 lbs without increase of symptoms  4) Pt will be able to sleep through the night without waking in pain  5) Pt will be able to rise from supine to sitting without head lag or pain.   6) Perform Gaze Stabilization x1 while walking for >/= 60 seconds without symptoms  7) Perform Gaze Stabilization x2 while walking for >/= 60 seconds without symptoms  8) Perform Saccades >/= 60 seconds without symptoms  9) Tolerate visual conflict while walking without symptoms  10) Tolerate return to physical activity protocol without symptoms       PLAN  [x]  Upgrade activities as tolerated     [x]  Continue plan of care  []  Update interventions per flow sheet       []  Discharge due to:_  []  Other:_      Mayur Dillon, PT, DPT 9/24/2019

## 2019-09-26 ENCOUNTER — APPOINTMENT (OUTPATIENT)
Dept: PHYSICAL THERAPY | Age: 39
End: 2019-09-26
Payer: MEDICAID

## 2019-09-27 ENCOUNTER — HOSPITAL ENCOUNTER (OUTPATIENT)
Dept: PHYSICAL THERAPY | Age: 39
Discharge: HOME | End: 2019-09-27
Payer: MEDICAID

## 2019-09-27 PROCEDURE — 97110 THERAPEUTIC EXERCISES: CPT | Performed by: PHYSICAL THERAPIST

## 2019-09-27 PROCEDURE — 97014 ELECTRIC STIMULATION THERAPY: CPT | Performed by: PHYSICAL THERAPIST

## 2019-09-27 NOTE — PROGRESS NOTES
PT DAILY TREATMENT NOTE 2-15    Patient Name: Silvana Query  Date:2019  : 1980  [x]  Patient  Verified  Payor: [place] / Plan: 1500  / Product Type: Medicaid /    In time: 4620Y Out time:1102a  Total Treatment Time (min): 60  Visit #:  4    Treatment Area: Low back pain [M54.5]  Neck pain [M54.2]  Consciousness loss of (HCC) [R40.20]    SUBJECTIVE  Pain Level (0-10 scale): 5/10  Any medication changes, allergies to medications, adverse drug reactions, diagnosis change, or new procedure performed?: [x] No    [] Yes (see summary sheet for update)  Subjective functional status/changes:   [] No changes reported  Pt reports that she is noticing improvement with each therapy session. \"Dizziness is rare. \"    OBJECTIVE    Modality rationale: decrease pain to improve the patients ability to maintain core stabilization with activity   Min Type Additional Details      15 [x] Estim: []Att   [x]Unatt    []TENS instruct                  []IFC  [x]Premod   []NMES                     []Other:  []w/US   []w/ice   [x]w/heat  Position: supine  Location: neck       []  Traction: [] Cervical       []Lumbar                       [] Prone          []Supine                       []Intermittent   []Continuous Lbs:  [] before manual  [] after manual  []w/heat    []  Ultrasound: []Continuous   [] Pulsed                       at: []1MHz   []3MHz Location:  W/cm2:    [] Paraffin         Location:   []w/heat    []  Ice     []  Heat  []  Ice massage Position:  Location:    []  Laser  []  Other: Position:  Location:      []  Vasopneumatic Device Pressure:       [] lo [] med [] hi   Temperature:      [x] Skin assessment post-treatment:  [x]intact []redness- no adverse reaction    []redness  adverse reaction:         45 min Therapeutic Exercise:  [x] See flow sheet :    Rationale: increase ROM, increase strength, improve coordination, improve balance and increase proprioception to improve the patients ability to maintain core stabilization with activity           With   [] TE   [] TA   [x] neuro   [] other: Patient Education: [x] Review HEP    [] Progressed/Changed HEP based on:   [] positioning   [] body mechanics   [] transfers   [] heat/ice application    [] other:      Other Objective/Functional Measures:-     Pain Level (0-10 scale) post treatment: 2/10    ASSESSMENT/Changes in Function:   Reducing pain with therex. Still labored supine to sit. Patient will continue to benefit from skilled PT services to modify and progress therapeutic interventions, address functional mobility deficits, address ROM deficits, address strength deficits, analyze and address soft tissue restrictions, analyze and cue movement patterns, analyze and modify body mechanics/ergonomics, assess and modify postural abnormalities and address imbalance/dizziness to attain remaining goals. []  See Plan of Care  []  See progress note/recertification  []  See Discharge Summary         Progress towards goals / Updated goals:  Short Term Goals: To be accomplished in 6-8 treatments:  1) Pt will be independent with HEP Progressing  2) Pt will be able to sit with upright posture >/= 5 minutes without increase of symptoms MET  3) Pt will report improvement in cervical Rotation to look over shoulders while driving. MET  4) Pt will be able to perform VOR x 1 at 100 bpm in standing without reproduction of symptoms      Long Term Goals: To be accomplished in 10-16 treatments:  1) Pt will be able to read without increase of neck pain  2) Pt will be able to look over shoulders while driving without increase of neck pain  3) Pt will demonstrate ability to lift >/= 20 lbs without increase of symptoms  4) Pt will be able to sleep through the night without waking in pain  5) Pt will be able to rise from supine to sitting without head lag or pain.   6) Perform Gaze Stabilization x1 while walking for >/= 60 seconds without symptoms  7) Perform Gaze Stabilization x2 while walking for >/= 60 seconds without symptoms  8) Perform Saccades >/= 60 seconds without symptoms  9) Tolerate visual conflict while walking without symptoms  10) Tolerate return to physical activity protocol without symptoms       PLAN  [x]  Upgrade activities as tolerated     [x]  Continue plan of care  []  Update interventions per flow sheet       []  Discharge due to:_  []  Other:_      Veronika Billingsley, PT, DPT 9/27/2019

## 2019-10-01 ENCOUNTER — APPOINTMENT (OUTPATIENT)
Dept: PHYSICAL THERAPY | Age: 39
End: 2019-10-01
Payer: MEDICAID

## 2019-10-02 ENCOUNTER — HOSPITAL ENCOUNTER (OUTPATIENT)
Dept: PHYSICAL THERAPY | Age: 39
Discharge: HOME | End: 2019-10-02
Payer: MEDICAID

## 2019-10-02 ENCOUNTER — OFFICE VISIT (OUTPATIENT)
Dept: ENDOCRINOLOGY | Age: 39
End: 2019-10-02

## 2019-10-02 VITALS
BODY MASS INDEX: 39.07 KG/M2 | HEART RATE: 96 BPM | SYSTOLIC BLOOD PRESSURE: 135 MMHG | DIASTOLIC BLOOD PRESSURE: 87 MMHG | WEIGHT: 199 LBS | HEIGHT: 60 IN

## 2019-10-02 DIAGNOSIS — E05.90 HYPERTHYROIDISM: Primary | ICD-10-CM

## 2019-10-02 PROCEDURE — 97110 THERAPEUTIC EXERCISES: CPT

## 2019-10-02 PROCEDURE — 97140 MANUAL THERAPY 1/> REGIONS: CPT

## 2019-10-02 NOTE — PROGRESS NOTES
PT DAILY TREATMENT NOTE 2-15    Patient Name: Franc Ear  Date:10/2/2019  : 1980  [x]  Patient  Verified  Payor: [address] / Plan: [address] [facility] / Product Type: Managed Care Medicaid /    In time: 12:25P Out time: 1:30P  Total Treatment Time (min): 65  Visit #:  5    Treatment Area: Low back pain [M54.5]  Neck pain [M54.2]  Consciousness loss of [R40.20]    SUBJECTIVE  Pain Level (0-10 scale): 2/10  Any medication changes, allergies to medications, adverse drug reactions, diagnosis change, or new procedure performed?: [x] No    [] Yes (see summary sheet for update)  Subjective functional status/changes:   [] No changes reported  Pt reported that her knee has been hurting her the last couple of days. She is unsure of what she may have done to contribute to that. \"maybe I came down my stairs wrong or something, I don't know. \"    OBJECTIVE    Modality rationale: decrease pain to improve the patients ability to maintain core stabilization with activity   Min Type Additional Details      15 [x] Estim: []Att   [x]Unatt    []TENS instruct                  []IFC  [x]Premod   []NMES                     []Other:  []w/US   []w/ice   [x]w/heat  Position: supine  Location: neck       []  Traction: [] Cervical       []Lumbar                       [] Prone          []Supine                       []Intermittent   []Continuous Lbs:  [] before manual  [] after manual  []w/heat    []  Ultrasound: []Continuous   [] Pulsed                       at: []1MHz   []3MHz Location:  W/cm2:    [] Paraffin         Location:   []w/heat    []  Ice     []  Heat  []  Ice massage Position:  Location:    []  Laser  []  Other: Position:  Location:      []  Vasopneumatic Device Pressure:       [] lo [] med [] hi   Temperature:      [x] Skin assessment post-treatment:  [x]intact []redness- no adverse reaction    []redness  adverse reaction:       50 min Therapeutic Exercise:  [x] See flow sheet :    Rationale: increase ROM, increase strength, improve coordination, improve balance and increase proprioception to improve the patients ability to maintain core stabilization with activity      With   [] TE   [] TA   [x] neuro   [] other: Patient Education: [x] Review HEP    [] Progressed/Changed HEP based on:   [] positioning   [] body mechanics   [] transfers   [] heat/ice application    [] other:      Other Objective/Functional Measures: --     Pain Level (0-10 scale) post treatment: 0/10    ASSESSMENT/Changes in Function:   Held on TG exercise secondary to knee pain. Pt tolerated farmer's carry without increase pain. Pt did report fatigue. Patient will continue to benefit from skilled PT services to modify and progress therapeutic interventions, address functional mobility deficits, address ROM deficits, address strength deficits, analyze and address soft tissue restrictions, analyze and cue movement patterns, analyze and modify body mechanics/ergonomics, assess and modify postural abnormalities and address imbalance/dizziness to attain remaining goals. [x]  See Plan of Care  []  See progress note/recertification  []  See Discharge Summary         Progress towards goals / Updated goals:  Short Term Goals: To be accomplished in 6-8 treatments:  1) Pt will be independent with HEP Progressing  2) Pt will be able to sit with upright posture >/= 5 minutes without increase of symptoms MET  3) Pt will report improvement in cervical Rotation to look over shoulders while driving. MET  4) Pt will be able to perform VOR x 1 at 100 bpm in standing without reproduction of symptoms      Long Term Goals:  To be accomplished in 10-16 treatments:  1) Pt will be able to read without increase of neck pain  2) Pt will be able to look over shoulders while driving without increase of neck pain progressing  3) Pt will demonstrate ability to lift >/= 20 lbs without increase of symptoms  4) Pt will be able to sleep through the night without waking in pain  5) Pt will be able to rise from supine to sitting without head lag or pain.   6) Perform Gaze Stabilization x1 while walking for >/= 60 seconds without symptoms  7) Perform Gaze Stabilization x2 while walking for >/= 60 seconds without symptoms  8) Perform Saccades >/= 60 seconds without symptoms  9) Tolerate visual conflict while walking without symptoms  10) Tolerate return to physical activity protocol without symptoms       PLAN  [x]  Upgrade activities as tolerated     [x]  Continue plan of care  []  Update interventions per flow sheet       []  Discharge due to:_  []  Other:_      Isaura Brower PTA, [facility] 10/2/2019
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
## 2019-10-03 ENCOUNTER — APPOINTMENT (OUTPATIENT)
Dept: PHYSICAL THERAPY | Age: 39
End: 2019-10-03
Payer: MEDICAID

## 2019-10-03 LAB
T3 SERPL-MCNC: 128 NG/DL (ref 71–180)
T4 FREE SERPL-MCNC: 1.37 NG/DL (ref 0.82–1.77)
TSH SERPL DL<=0.005 MIU/L-ACNC: 0.69 UIU/ML (ref 0.45–4.5)

## 2019-10-03 NOTE — PROGRESS NOTES
Spoke with pt regarding her TFTs. Pt to decrease her MMI from 20mg daily to 10mg daily. Pt voices understanding and agreement with the plan.

## 2019-10-04 ENCOUNTER — HOSPITAL ENCOUNTER (OUTPATIENT)
Dept: PHYSICAL THERAPY | Age: 39
Discharge: HOME | End: 2019-10-04
Payer: MEDICAID

## 2019-10-04 PROCEDURE — 97014 ELECTRIC STIMULATION THERAPY: CPT | Performed by: PHYSICAL THERAPIST

## 2019-10-04 PROCEDURE — 97110 THERAPEUTIC EXERCISES: CPT | Performed by: PHYSICAL THERAPIST

## 2019-10-04 NOTE — PROGRESS NOTES
PT DAILY TREATMENT NOTE 2-15    Patient Name: Manjit Her  Date:10/4/2019  : 1980  [x]  Patient  Verified  Payor: [address] / Plan: [address] [facility] / Product Type: Managed Care Medicaid /    In time: 130O  Out time: 1045a  Total Treatment Time (min): 72  Visit #:  6    Treatment Area: Low back pain [M54.5]  Neck pain [M54.2]  Consciousness loss of [R40.20]    SUBJECTIVE  Pain Level (0-10 scale): 2/10  Any medication changes, allergies to medications, adverse drug reactions, diagnosis change, or new procedure performed?: [x] No    [] Yes (see summary sheet for update)  Subjective functional status/changes:   [] No changes reported  Pt reported that her right knee is still hurting.     OBJECTIVE    Modality rationale: decrease pain to improve the patients ability to maintain core stabilization with activity   Min Type Additional Details      15 [x] Estim: []Att   [x]Unatt    []TENS instruct                  []IFC  [x]Premod   []NMES                     []Other:  []w/US   []w/ice   [x]w/heat  Position: supine  Location: neck       []  Traction: [] Cervical       []Lumbar                       [] Prone          []Supine                       []Intermittent   []Continuous Lbs:  [] before manual  [] after manual  []w/heat    []  Ultrasound: []Continuous   [] Pulsed                       at: []1MHz   []3MHz Location:  W/cm2:    [] Paraffin         Location:   []w/heat    []  Ice     []  Heat  []  Ice massage Position:  Location:    []  Laser  []  Other: Position:  Location:      []  Vasopneumatic Device Pressure:       [] lo [] med [] hi   Temperature:      [x] Skin assessment post-treatment:  [x]intact []redness- no adverse reaction    []redness  adverse reaction:       57 min Therapeutic Exercise:  [x] See flow sheet :    Rationale: increase ROM, increase strength, improve coordination, improve balance and increase proprioception to improve the patients ability to maintain core stabilization with activity      With   [] TE   [] TA   [x] neuro   [] other: Patient Education: [x] Review HEP    [] Progressed/Changed HEP based on:   [] positioning   [] body mechanics   [] transfers   [] heat/ice application    [] other:      Other Objective/Functional Measures: --     Pain Level (0-10 scale) post treatment: 0/10    ASSESSMENT/Changes in Function:   Held on TG exercise secondary to knee pain. Increased resistance load on standing rows, farmers carry and shoulder shrugs. Advanced VOR x 1 to 90 bpm.  Patient will continue to benefit from skilled PT services to modify and progress therapeutic interventions, address functional mobility deficits, address ROM deficits, address strength deficits, analyze and address soft tissue restrictions, analyze and cue movement patterns, analyze and modify body mechanics/ergonomics, assess and modify postural abnormalities and address imbalance/dizziness to attain remaining goals. []  See Plan of Care  []  See progress note/recertification  []  See Discharge Summary         Progress towards goals / Updated goals:  Short Term Goals: To be accomplished in 6-8 treatments:  1) Pt will be independent with HEP Progressing  2) Pt will be able to sit with upright posture >/= 5 minutes without increase of symptoms MET  3) Pt will report improvement in cervical Rotation to look over shoulders while driving. MET  4) Pt will be able to perform VOR x 1 at 100 bpm in standing without reproduction of symptoms      Long Term Goals: To be accomplished in 10-16 treatments:  1) Pt will be able to read without increase of neck pain  2) Pt will be able to look over shoulders while driving without increase of neck pain progressing  3) Pt will demonstrate ability to lift >/= 20 lbs without increase of symptoms  4) Pt will be able to sleep through the night without waking in pain  5) Pt will be able to rise from supine to sitting without head lag or pain.   6) Perform Gaze Stabilization x1 while walking for >/= 60 seconds without symptoms  7) Perform Gaze Stabilization x2 while walking for >/= 60 seconds without symptoms  8) Perform Saccades >/= 60 seconds without symptoms  9) Tolerate visual conflict while walking without symptoms  10) Tolerate return to physical activity protocol without symptoms       PLAN  [x]  Upgrade activities as tolerated     [x]  Continue plan of care  []  Update interventions per flow sheet       []  Discharge due to:_  []  Other:_      Nelson Wheatley, PT, DPT, PTA, [facility] 10/4/2019

## 2019-10-08 ENCOUNTER — DOCUMENTATION ONLY (OUTPATIENT)
Dept: INTERNAL MEDICINE CLINIC | Age: 39
End: 2019-10-08

## 2019-10-08 ENCOUNTER — APPOINTMENT (OUTPATIENT)
Dept: PHYSICAL THERAPY | Age: 39
End: 2019-10-08
Payer: MEDICAID

## 2019-10-08 NOTE — PROGRESS NOTES
Order signed 10/3/19 for [facility] PT faxed 10/8/19 to [phone]; order and confirmation scanned into cc.

## 2019-10-10 ENCOUNTER — APPOINTMENT (OUTPATIENT)
Dept: PHYSICAL THERAPY | Age: 39
End: 2019-10-10
Payer: MEDICAID

## 2019-10-11 ENCOUNTER — HOSPITAL ENCOUNTER (OUTPATIENT)
Dept: PHYSICAL THERAPY | Age: 39
Discharge: HOME | End: 2019-10-11
Payer: MEDICAID

## 2019-10-11 ENCOUNTER — OFFICE VISIT (OUTPATIENT)
Dept: INTERNAL MEDICINE CLINIC | Age: 39
End: 2019-10-11

## 2019-10-11 ENCOUNTER — TELEPHONE (OUTPATIENT)
Dept: INTERNAL MEDICINE CLINIC | Age: 39
End: 2019-10-11

## 2019-10-11 VITALS
BODY MASS INDEX: 38.87 KG/M2 | HEIGHT: 60 IN | DIASTOLIC BLOOD PRESSURE: 72 MMHG | OXYGEN SATURATION: 100 % | RESPIRATION RATE: 16 BRPM | HEART RATE: 88 BPM | WEIGHT: 198 LBS | SYSTOLIC BLOOD PRESSURE: 124 MMHG

## 2019-10-11 DIAGNOSIS — S06.0X0A CONCUSSION WITHOUT LOSS OF CONSCIOUSNESS, INITIAL ENCOUNTER: Primary | ICD-10-CM

## 2019-10-11 PROCEDURE — 97014 ELECTRIC STIMULATION THERAPY: CPT | Performed by: PHYSICAL THERAPIST

## 2019-10-11 PROCEDURE — 97110 THERAPEUTIC EXERCISES: CPT | Performed by: PHYSICAL THERAPIST
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
## 2019-10-11 NOTE — TELEPHONE ENCOUNTER
Val Grullon     [phone]    Regarding [place] paperwork that was filled out for patient. Has some questions regarding some conflicting information on the paperwork. Would like a call back as soon as possible. Thank you!

## 2019-10-11 NOTE — PROGRESS NOTES
PT DAILY TREATMENT NOTE 2-15    Patient Name: Darlene Gatica  Date:10/11/2019  : 1980  [x]  Patient  Verified  Payor: [address] / Plan: [address] [facility] / Product Type: Managed Care Medicaid /    In time: 905a  Out time: 1028a  Total Treatment Time (min): 75  Visit #:  7    Treatment Area: Low back pain [M54.5]  Neck pain [M54.2]  Consciousness loss of [R40.20]    SUBJECTIVE  Pain Level (0-10 scale): 2/10  Any medication changes, allergies to medications, adverse drug reactions, diagnosis change, or new procedure performed?: [x] No    [] Yes (see summary sheet for update)  Subjective functional status/changes:   [] No changes reported  Pt reported that her right knee is still hurting. Other than her knee bothering her she feels like she is doing much better.     OBJECTIVE    Modality rationale: decrease pain to improve the patients ability to maintain core stabilization with activity   Min Type Additional Details      15 [x] Estim: []Att   [x]Unatt    []TENS instruct                  []IFC  [x]Premod   []NMES                     []Other:  []w/US   []w/ice   [x]w/heat  Position: supine  Location: neck       []  Traction: [] Cervical       []Lumbar                       [] Prone          []Supine                       []Intermittent   []Continuous Lbs:  [] before manual  [] after manual  []w/heat    []  Ultrasound: []Continuous   [] Pulsed                       at: []1MHz   []3MHz Location:  W/cm2:    [] Paraffin         Location:   []w/heat    []  Ice     []  Heat  []  Ice massage Position:  Location:    []  Laser  []  Other: Position:  Location:      []  Vasopneumatic Device Pressure:       [] lo [] med [] hi   Temperature:      [x] Skin assessment post-treatment:  [x]intact []redness- no adverse reaction    []redness  adverse reaction:       60 min Therapeutic Exercise:  [x] See flow sheet :    Rationale: increase ROM, increase strength, improve coordination, improve balance and increase proprioception to improve the patients ability to maintain core stabilization with activity      With   [] TE   [] TA   [x] neuro   [] other: Patient Education: [x] Review HEP    [] Progressed/Changed HEP based on:   [] positioning   [] body mechanics   [] transfers   [] heat/ice application    [] other:      Other Objective/Functional Measures: --     Pain Level (0-10 scale) post treatment: 0/10    ASSESSMENT/Changes in Function:   No problem with visual performance today at 100 bpm.  Still limiting squatting activity to avoid aggravating her knee. Recommended her see her PCP for her knee pain. Patient will continue to benefit from skilled PT services to modify and progress therapeutic interventions, address functional mobility deficits, address ROM deficits, address strength deficits, analyze and address soft tissue restrictions, analyze and cue movement patterns, analyze and modify body mechanics/ergonomics, assess and modify postural abnormalities and address imbalance/dizziness to attain remaining goals. []  See Plan of Care  []  See progress note/recertification  []  See Discharge Summary         Progress towards goals / Updated goals:  Short Term Goals: To be accomplished in 6-8 treatments:  1) Pt will be independent with HEP Progressing  2) Pt will be able to sit with upright posture >/= 5 minutes without increase of symptoms MET  3) Pt will report improvement in cervical Rotation to look over shoulders while driving. MET  4) Pt will be able to perform VOR x 1 at 100 bpm in standing without reproduction of symptoms MET      Long Term Goals:  To be accomplished in 10-16 treatments:  1) Pt will be able to read without increase of neck pain  2) Pt will be able to look over shoulders while driving without increase of neck pain progressing  3) Pt will demonstrate ability to lift >/= 20 lbs without increase of symptoms  4) Pt will be able to sleep through the night without waking in pain  5) Pt will be able to rise from supine to sitting without head lag or pain.   6) Perform Gaze Stabilization x1 while walking for >/= 60 seconds without symptoms  7) Perform Gaze Stabilization x2 while walking for >/= 60 seconds without symptoms  8) Perform Saccades >/= 60 seconds without symptoms  9) Tolerate visual conflict while walking without symptoms  10) Tolerate return to physical activity protocol without symptoms       PLAN  [x]  Upgrade activities as tolerated     [x]  Continue plan of care  []  Update interventions per flow sheet       []  Discharge due to:_  []  Other:_      Rigo Toledo, PT, DPT, PTA, OPTA 10/11/2019

## 2019-11-11 ENCOUNTER — OFFICE VISIT (OUTPATIENT)
Dept: INTERNAL MEDICINE CLINIC | Age: 39
End: 2019-11-11

## 2019-11-11 VITALS
RESPIRATION RATE: 16 BRPM | HEIGHT: 60 IN | HEART RATE: 92 BPM | OXYGEN SATURATION: 100 % | WEIGHT: 197.6 LBS | DIASTOLIC BLOOD PRESSURE: 84 MMHG | SYSTOLIC BLOOD PRESSURE: 145 MMHG | TEMPERATURE: 98 F | BODY MASS INDEX: 38.79 KG/M2

## 2019-11-11 DIAGNOSIS — R42 DIZZINESS: ICD-10-CM

## 2019-11-11 DIAGNOSIS — S06.0X0D CONCUSSION WITHOUT LOSS OF CONSCIOUSNESS, SUBSEQUENT ENCOUNTER: Primary | ICD-10-CM

## 2019-11-11 DIAGNOSIS — R41.840 CONCENTRATION DEFICIT: ICD-10-CM
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
## 2020-01-10 ENCOUNTER — OFFICE VISIT (OUTPATIENT)
Dept: ENDOCRINOLOGY | Age: 40
End: 2020-01-10

## 2020-01-10 VITALS
HEIGHT: 60 IN | BODY MASS INDEX: 39.38 KG/M2 | WEIGHT: 200.6 LBS | SYSTOLIC BLOOD PRESSURE: 132 MMHG | HEART RATE: 78 BPM | DIASTOLIC BLOOD PRESSURE: 79 MMHG

## 2020-01-10 DIAGNOSIS — E05.90 HYPERTHYROIDISM: Primary | ICD-10-CM
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
## 2020-01-17 LAB
T3 SERPL-MCNC: 91 NG/DL (ref 71–180)
T4 FREE SERPL-MCNC: 1.23 NG/DL (ref 0.82–1.77)
TSH SERPL DL<=0.005 MIU/L-ACNC: 1.1 UIU/ML (ref 0.45–4.5)

## 2020-01-17 NOTE — PROGRESS NOTES
Spoke with pt regarding her TFTs. Pt to cut her MMI dose in half and take 5mg daily. Pt voices understanding and agreement with the plan.

## 2020-01-20 RX ORDER — IBUPROFEN 800 MG/1
TABLET ORAL
Qty: 15 TAB | Refills: 0 | Status: SHIPPED | OUTPATIENT
Start: 2020-01-20 | End: 2021-02-05

## 2020-03-19 NOTE — ANCILLARY DISCHARGE INSTRUCTIONS
[facility] [id number] [address], [address] Phone: [phone]  Fax: [phone] Discharge Summary  2-15 Patient name: Malinda   : 1980  Provider#: [id number] Referral source: Deanna Rod, * Medical/Treatment Diagnosis: Low back pain [M54.5] Neck pain [M54.2] Consciousness loss of [R40.20] Prior Hospitalization: see medical history Comorbidities: none Prior Level of Function: complete 20 minutes of exercise at least 3 times a week Medications: Verified on Patient Summary List 
  
Start of Care: 2019                                                                              Onset Date: 2019 Visits from Start of Care: 7     Missed Visits: 1 Reporting Period : 19 to 10/11/19 Short Term Goals: To be accomplished in 6-8 treatments: 
1) Pt will be independent with HEP Progressing 2) Pt will be able to sit with upright posture >/= 5 minutes without increase of symptoms MET 3) Pt will report improvement in cervical Rotation to look over shoulders while driving. MET 
4) Pt will be able to perform VOR x 1 at 100 bpm in standing without reproduction of symptoms MET 
   
Long Term Goals: To be accomplished in 10-16 treatments: 
1) Pt will be able to read without increase of neck pain 2) Pt will be able to look over shoulders while driving without increase of neck pain progressing 3) Pt will demonstrate ability to lift >/= 20 lbs without increase of symptoms 4) Pt will be able to sleep through the night without waking in pain 5) Pt will be able to rise from supine to sitting without head lag or pain. 6) Perform Gaze Stabilization x1 while walking for >/= 60 seconds without symptoms 7) Perform Gaze Stabilization x2 while walking for >/= 60 seconds without symptoms 8) Perform Saccades >/= 60 seconds without symptoms 9) Tolerate visual conflict while walking without symptoms 10) Tolerate return to physical activity protocol without symptoms ASSESSMENT/SUMMARY OF CARE: pt was seen for 7 sessions s/p neck and back pain. Treatment included therapeutic exercises, modalities, pt education, and HEP. Pt failed to FU following last session on 10/11/19. D/c from therapy at this time. RECOMMENDATIONS: 
[x]Discontinue therapy: []Patient has reached or is progressing toward set goals []Patient is non-compliant or has abdicated 
    []Due to lack of appreciable progress towards set goals Silvana Siegel, PT, DPT 3/19/2020

## 2020-04-08 DIAGNOSIS — E05.90 HYPERTHYROIDISM: ICD-10-CM

## 2020-04-08 RX ORDER — METHIMAZOLE 10 MG/1
TABLET ORAL
Qty: 30 TAB | Refills: 3 | Status: SHIPPED | OUTPATIENT
Start: 2020-04-08 | End: 2021-02-18 | Stop reason: SDUPTHER

## 2020-04-10 DIAGNOSIS — E05.90 HYPERTHYROIDISM: ICD-10-CM

## 2020-12-09 ENCOUNTER — VIRTUAL VISIT (OUTPATIENT)
Dept: ENDOCRINOLOGY | Age: 40
End: 2020-12-09

## 2021-01-08 ENCOUNTER — VIRTUAL VISIT (OUTPATIENT)
Dept: INTERNAL MEDICINE CLINIC | Age: 41
End: 2021-01-08
Payer: COMMERCIAL

## 2021-01-08 DIAGNOSIS — U07.1 COVID-19 VIRUS INFECTION: ICD-10-CM

## 2021-01-08 DIAGNOSIS — J45.20 MILD INTERMITTENT ASTHMA WITHOUT COMPLICATION: Primary | ICD-10-CM

## 2021-01-08 DIAGNOSIS — J40 BRONCHITIS: ICD-10-CM

## 2021-01-08 PROCEDURE — 99213 OFFICE O/P EST LOW 20 MIN: CPT | Performed by: NURSE PRACTITIONER

## 2021-01-08 RX ORDER — PREDNISONE 10 MG/1
TABLET ORAL
Qty: 21 TAB | Refills: 0 | Status: SHIPPED | OUTPATIENT
Start: 2021-01-08 | End: 2021-01-08 | Stop reason: SDUPTHER

## 2021-01-08 RX ORDER — PREDNISONE 10 MG/1
TABLET ORAL
Qty: 21 TAB | Refills: 0 | Status: SHIPPED | OUTPATIENT
Start: 2021-01-08 | End: 2021-02-05

## 2021-01-08 RX ORDER — AZITHROMYCIN 250 MG/1
TABLET, FILM COATED ORAL
Qty: 6 TAB | Refills: 0 | Status: SHIPPED | OUTPATIENT
Start: 2021-01-08 | End: 2021-01-08 | Stop reason: SDUPTHER

## 2021-01-08 RX ORDER — AZITHROMYCIN 250 MG/1
TABLET, FILM COATED ORAL
Qty: 6 TAB | Refills: 0 | Status: SHIPPED | OUTPATIENT
Start: 2021-01-08 | End: 2021-01-13

## 2021-01-08 NOTE — PROGRESS NOTES
Virtual visit    Chief Complaint   Patient presents with    Positive For Covid-19     Pt was told to have bronchial tubes checked    Rib Pain     right side, comes and goes. tested positive on 1/5/21      1. Have you been to the ER, urgent care clinic since your last visit? Hospitalized since your last visit? No    2. Have you seen or consulted any other health care providers outside of the [address] [place] [address] since your last visit? Include any pap smears or colon screening. No        Health Maintenance Due   Topic Date Due    DTaP/Tdap/Td series (1 - Tdap) 05/08/2001    PAP AKA CERVICAL CYTOLOGY  05/08/2001    Lipid Screen  05/08/2020    Flu Vaccine (1) 09/01/2020           3 most recent PHQ Screens 1/8/2021   Little interest or pleasure in doing things Not at all   Feeling down, depressed, irritable, or hopeless Several days   Total Score PHQ 2 1           Learning Assessment 7/8/2019   PRIMARY LEARNER Patient   HIGHEST LEVEL OF EDUCATION - PRIMARY LEARNER  -   BARRIERS PRIMARY LEARNER NONE   PRIMARY LANGUAGE ENGLISH   LEARNER PREFERENCE PRIMARY READING     DEMONSTRATION   ANSWERED BY patient    RELATIONSHIP SELF         An After Visit Summary was printed and given to the patient.

## 2021-01-12 NOTE — PROGRESS NOTES
Subjective  Taylor Chavez is a 36 y.o. female presents for acute care     Taylor Chavez, who was evaluated through a synchronous (real-time) audio-video encounter, and/or her healthcare decision maker, is aware that it is a billable service, with coverage as determined by her insurance carrier. She provided verbal consent to proceed: Yes, and patient identification was verified. It was conducted pursuant to the emergency declaration under the [address] [place] [address] and the [employer] Act. A caregiver was present when appropriate. Ability to conduct physical exam was limited. I was in the office. The patient was at home. HPI   Patient diagnosed with COVID-19 infection last Tuesday by VD  SOB better   Had fever \"first couple of days\"  Lost of sense of taste and smell   Was advised to follow up with PCP d/t risk for bacterial lung infection    Past Medical History:   Diagnosis Date    Anxiety     History of panic attacks.  Asthma     Endocrine disease     hyperthyroidism    Patellar tracking disorder     Bilat. Ortho eval with Dr. Sapphire Travis April 2018. Primary osteoarthritis right knee. Current Outpatient Medications   Medication Sig    azithromycin (ZITHROMAX) 250 mg tablet Take 2 tablets today, then take 1 tablet daily    predniSONE (STERAPRED DS) 10 mg dose pack See administration instruction per 10mg dose pack    methIMAzole (TAPAZOLE) 10 mg tablet TAKE 1 TABLET BY MOUTH EVERY DAY    diclofenac EC (VOLTAREN) 75 mg EC tablet TAKE 1 TABLET BY MOUTH TWICE A DAY AFTER EATING    propranolol (INDERAL) 10 mg tablet TAKE 1 TABLET BY MOUTH TWO (2) TIMES A DAY.     ibuprofen (MOTRIN) 800 mg tablet TAKE 1 TABLET EVERY 8 HOURS AS NEEDED    lidocaine (LIDODERM) 5 % APPLY 1 PATCH EVERY DAY (ON FOR 12 HOURS OFF FOR 12 HOURS)    ibuprofen (MOTRIN) 600 mg tablet Take 1 Tab by mouth every six (6) hours as needed for Pain. No current facility-administered medications for this visit. No Known Allergies           Review of Systems   Constitutional: Positive for chills. Negative for fever. HENT: Negative. Eyes: Negative. Respiratory: Positive for cough. Negative for hemoptysis, sputum production, shortness of breath and wheezing. Cardiovascular: Positive for chest pain (right side ). Gastrointestinal: Negative. Genitourinary: Negative. Musculoskeletal: Negative. Neurological: Negative for dizziness and tingling. Objective  Physical Exam     Assessment & Plan    ICD-10-CM ICD-9-CM    1. Mild intermittent asthma without complication  X76.08 038.90 predniSONE (STERAPRED DS) 10 mg dose pack      DISCONTINUED: predniSONE (STERAPRED DS) 10 mg dose pack   2. COVID-19 virus infection  U07.1 079.89    3. Bronchitis  J40 490 azithromycin (ZITHROMAX) 250 mg tablet      predniSONE (STERAPRED DS) 10 mg dose pack      DISCONTINUED: azithromycin (ZITHROMAX) 250 mg tablet      DISCONTINUED: predniSONE (STERAPRED DS) 10 mg dose pack       encouraged supportive care  Advised chest xray after completion of above medications   Encouraged use of PPE, hand hygiene, social distancing   Follow recommended quarantine period    reviewed diet, exercise and weight control  reviewed medications and side effects in detail    Patient voices understanding and acceptance of this advice and will call back if any further questions or concerns. Patient voices understanding and acceptance of this advice and will call back if any further questions or concerns.   Candace Vega NP

## 2021-02-05 ENCOUNTER — VIRTUAL VISIT (OUTPATIENT)
Dept: ENDOCRINOLOGY | Age: 41
End: 2021-02-05
Payer: COMMERCIAL

## 2021-02-05 DIAGNOSIS — E05.90 HYPERTHYROIDISM: Primary | ICD-10-CM

## 2021-02-05 PROCEDURE — 99214 OFFICE O/P EST MOD 30 MIN: CPT | Performed by: INTERNAL MEDICINE

## 2021-02-05 NOTE — PROGRESS NOTES
Chief Complaint   Patient presents with    Thyroid Problem     pcp and pharmacy verified. Doxy. Me   Records since last visit reviewed         **THIS IS A VIRTUAL VISIT VIA A VIDEO ENCOUNTER. PATIENT AGREED TO HAVE THEIR CARE DELIVERED OVER VIDEO IN PLACE OF THEIR REGULARLY SCHEDULED OFFICE VISIT**      History of Present Illness: Luisito Mae is a 36 y.o. female here for follow up of hyperthyroidism. At our last visit in January 2020 she was taking MMI 10mg daily. Her TSH was 1.100 with FT4 of 1.23 and TT3 of 91. I instructed her to cut her MMI in half and take 5mg daily. Pt has not been back to see me since January 2020. She notes that she had COVID in January 2021, \"I am still recovering from that. We think that my Grandmother got COVID in the hospital and when she came home for hospice, it spread through the family. \"    Pt notes she has had some weight gain \"which has been going on for awhile now. I have been trying intermitent fasting, watching what I eat and juicing. I am currently weighing 211 pounds. I am not working out and when I try to go out I am still coughing and get bad chills\"    Pt is taking MMI 10mg every other day. She denies issues of CP, palpitations, she notes she get chills and I will sometimes get hot flashes. She notes occasional diarrhea since she contracted COVID. She denies issues of dysphagia or dysphonia. She has not been needing the saline eye drops. She denies erythema or eye pain. She has follow up with the eye doctor in the near future. \"Last time he did note some bulging in my right eye, but nothing severe\". Current Outpatient Medications   Medication Sig    methIMAzole (TAPAZOLE) 10 mg tablet TAKE 1 TABLET BY MOUTH EVERY DAY (Patient taking differently: 10 mg every other day.)     No current facility-administered medications for this visit.       No Known Allergies  Review of Systems:  - Cardiovascular: no chest pain  - Neurological: no tremors  - Integumentary: skin is normal    Physical Examination:  There were no vitals taken for this visit. - GENERAL: NCAT, Appears well nourished   - EYES: EOMI, non-icteric, no proptosis   - Ear/Nose/Throat: NCAT, no visible inflammation or masses   - CARDIOVASCULAR: no cyanosis, no visible JVD   - RESPIRATORY: respiratory effort normal without any distress or labored breathing   - MUSCULOSKELETAL: Normal ROM of neck and upper extremities observed   - SKIN: No rash on face   - NEUROLOGIC:  No facial asymmetry (Cranial nerve 7 motor function), No gaze palsy   - PSYCHIATRIC: Normal affect, Normal insight and judgement   -     Data Reviewed:   - None  Assessment/Plan:   1) Hyperthyroidism > Pt has had some weight gain and notes some cold intolerance. Will order TFTs and adjust her MMI as able. Pt instructed to stay off the Propranolol, and only take it as needed for palpitations or tachycardia. We discussed BURKETT and thyroidectomy, but she wants to stick with the MMI for now. Pt voices understanding and agreement with the plan.   Pain noted and pt was recommended to call her PCP for further evaluation and treatment, as needed      RTC based on the above results          Copy sent to:  Dr. Ely Ulrich

## 2021-02-18 ENCOUNTER — TELEPHONE (OUTPATIENT)
Dept: ENDOCRINOLOGY | Age: 41
End: 2021-02-18

## 2021-02-18 DIAGNOSIS — E05.90 HYPERTHYROIDISM: ICD-10-CM

## 2021-02-18 LAB
ALBUMIN SERPL-MCNC: 4.3 G/DL (ref 3.8–4.8)
ALBUMIN/GLOB SERPL: 1.5 {RATIO} (ref 1.2–2.2)
ALP SERPL-CCNC: 72 IU/L (ref 39–117)
ALT SERPL-CCNC: 22 IU/L (ref 0–32)
AST SERPL-CCNC: 19 IU/L (ref 0–40)
BILIRUB SERPL-MCNC: 0.3 MG/DL (ref 0–1.2)
BUN SERPL-MCNC: 13 MG/DL (ref 6–24)
BUN/CREAT SERPL: 19 (ref 9–23)
CALCIUM SERPL-MCNC: 9.1 MG/DL (ref 8.7–10.2)
CHLORIDE SERPL-SCNC: 100 MMOL/L (ref 96–106)
CO2 SERPL-SCNC: 24 MMOL/L (ref 20–29)
CREAT SERPL-MCNC: 0.69 MG/DL (ref 0.57–1)
ERYTHROCYTE [DISTWIDTH] IN BLOOD BY AUTOMATED COUNT: 13.6 % (ref 11.7–15.4)
GLOBULIN SER CALC-MCNC: 2.9 G/DL (ref 1.5–4.5)
GLUCOSE SERPL-MCNC: 101 MG/DL (ref 65–99)
HCT VFR BLD AUTO: 36.4 % (ref 34–46.6)
HGB BLD-MCNC: 12.4 G/DL (ref 11.1–15.9)
MCH RBC QN AUTO: 27.4 PG (ref 26.6–33)
MCHC RBC AUTO-ENTMCNC: 34.1 G/DL (ref 31.5–35.7)
MCV RBC AUTO: 81 FL (ref 79–97)
PLATELET # BLD AUTO: 402 X10E3/UL (ref 150–450)
POTASSIUM SERPL-SCNC: 4.5 MMOL/L (ref 3.5–5.2)
PROT SERPL-MCNC: 7.2 G/DL (ref 6–8.5)
RBC # BLD AUTO: 4.52 X10E6/UL (ref 3.77–5.28)
SODIUM SERPL-SCNC: 136 MMOL/L (ref 134–144)
T3 SERPL-MCNC: 104 NG/DL (ref 71–180)
T4 FREE SERPL-MCNC: 1.12 NG/DL (ref 0.82–1.77)
TSH RECEP AB SER-ACNC: 3.26 IU/L (ref 0–1.75)
TSH SERPL DL<=0.005 MIU/L-ACNC: 0.02 UIU/ML (ref 0.45–4.5)
TSI SER-ACNC: 1.49 IU/L (ref 0–0.55)
WBC # BLD AUTO: 10.1 X10E3/UL (ref 3.4–10.8)

## 2021-02-18 RX ORDER — ATENOLOL 25 MG/1
25 TABLET ORAL DAILY
Qty: 30 TAB | Refills: 3 | Status: SHIPPED | OUTPATIENT
Start: 2021-02-18 | End: 2022-02-08

## 2021-02-18 RX ORDER — METHIMAZOLE 5 MG/1
5 TABLET ORAL DAILY
Qty: 30 TAB | Refills: 3 | Status: SHIPPED | OUTPATIENT
Start: 2021-02-18 | End: 2021-06-16 | Stop reason: SDUPTHER

## 2021-02-18 NOTE — TELEPHONE ENCOUNTER
----- Message from Andi Castillo sent at 2/18/2021  9:26 AM EST -----  Regarding: Dr. Keith Bones: [phone]  Patient return call    Caller's first and last name and relationship (if not the patient):Pt      Best contact number(s):[phone]      Whose call is being returned:Dr. Shama Giles      Details to clarify the request:Patient is returning a call regarding her blood work results.        Andi Castillo

## 2021-02-18 NOTE — TELEPHONE ENCOUNTER
Spoke with pt regarding her labs. Pt to change her MMI to 5mg once daily and the Atenolol 25mg PRN only for palpitations. Pt voices understanding and agreement with the plan.

## 2021-06-16 ENCOUNTER — OFFICE VISIT (OUTPATIENT)
Dept: ENDOCRINOLOGY | Age: 41
End: 2021-06-16
Payer: MEDICAID

## 2021-06-16 VITALS
BODY MASS INDEX: 40.44 KG/M2 | WEIGHT: 206 LBS | HEART RATE: 96 BPM | DIASTOLIC BLOOD PRESSURE: 79 MMHG | HEIGHT: 60 IN | SYSTOLIC BLOOD PRESSURE: 135 MMHG

## 2021-06-16 DIAGNOSIS — E05.90 HYPERTHYROIDISM: ICD-10-CM

## 2021-06-16 PROCEDURE — 99214 OFFICE O/P EST MOD 30 MIN: CPT | Performed by: INTERNAL MEDICINE

## 2021-06-16 RX ORDER — METHIMAZOLE 5 MG/1
5 TABLET ORAL DAILY
Qty: 90 TABLET | Refills: 1 | Status: SHIPPED | OUTPATIENT
Start: 2021-06-16 | End: 2022-05-16

## 2021-06-16 NOTE — LETTER
6/16/2021 Patient: Marbin Hanocck  
YOB: 1980 Date of Visit: 6/16/2021 Chioma Salomon MD 
[address] [place] [address] Via In [employer] Dear Chioma Salomon MD, Thank you for referring Ms. Marbin Hancock to [address] [place] [address] for evaluation. My notes for this consultation are attached. If you have questions, please do not hesitate to call me. I look forward to following your patient along with you. Sincerely, Claus Esparza MD

## 2021-09-30 ENCOUNTER — OFFICE VISIT (OUTPATIENT)
Dept: ENDOCRINOLOGY | Age: 41
End: 2021-09-30
Payer: MEDICAID

## 2021-09-30 VITALS
HEART RATE: 96 BPM | WEIGHT: 191.8 LBS | DIASTOLIC BLOOD PRESSURE: 78 MMHG | BODY MASS INDEX: 37.66 KG/M2 | HEIGHT: 60 IN | SYSTOLIC BLOOD PRESSURE: 130 MMHG

## 2021-09-30 DIAGNOSIS — E05.90 HYPERTHYROIDISM: Primary | ICD-10-CM

## 2021-09-30 PROCEDURE — 99213 OFFICE O/P EST LOW 20 MIN: CPT | Performed by: INTERNAL MEDICINE

## 2021-09-30 NOTE — LETTER
9/30/2021    Patient: Sal Veliz   YOB: 1980   Date of Visit: 9/30/2021     Briana Gomez MD  [address] [place] [id number]  Via In Basket    Dear Briana Gomez MD,      Thank you for referring Ms. Sal Veliz to [address] [place] [address] for evaluation. My notes for this consultation are attached. If you have questions, please do not hesitate to call me. I look forward to following your patient along with you.       Sincerely,    Александр Bonner MD

## 2021-09-30 NOTE — PROGRESS NOTES
Chief Complaint   Patient presents with    Thyroid Problem     pcp and pharmacy verified     History of Present Illness: Pascual Ham is a 39 y.o. female here for follow up of  hyperthyroidism. At our visit in February 2021 she was taking MMI 10mg every other day. Her TSH was 0.017 with FT4 1.12 and TT3 104. I changed her MMI to 5mg daily. At our last visit in June 2021 she was clinically euthyroid, but she did not have labs drawn, so we did not change her MMI dose. She denies any recent illnesses, inquiries or hospitalizations. She has not received her COVID vaccination. Her son who has Autism, just started 10th grade this year. She notes that she had COVID in January 2021. Pt is still taking the MMI 5mg once per day. She has not needed any Atenolol, she has not had any palpitations. Her weight today was 191 pounds, which is down 15 pounds since our last visit in June 2021. \"I have been working out and watching what I am eating. \"    She denies issues of palpitations, SOB she notes she get chills and I will sometimes get hot flashes. She denies issues of diarrhea or constipation. She denies issues of dysphagia or dysphonia. She has not been needing the saline eye drops. She denies erythema or eye pain. She has follow up with the eye doctor in the near future. She saw her eye doctor in February 2021. \"She said everything was looking fine\". Current Outpatient Medications   Medication Sig    methIMAzole (TAPAZOLE) 5 mg tablet Take 1 Tablet by mouth daily.  atenoloL (TENORMIN) 25 mg tablet Take 1 Tab by mouth daily. Take one pill daily as needed for palpitations. (Patient not taking: Reported on 6/16/2021)     No current facility-administered medications for this visit.      No Known Allergies  Review of Systems:  - Cardiovascular: + intermittent chest discomfort   - Neurological: no tremors  - Integumentary: skin is normal    Physical Examination:  Blood pressure 130/78, pulse 96, height 5' (1.524 m), weight 191 lb 12.8 oz (87 kg). - General: pleasant, no distress, good eye contact   - Neck: no thyromegaly or thyroid bruits  - Cardiovascular: regular, normal rate, nl s1 and s2, no m/r/g   - Integumentary: skin is normal, no edema  - Neurological: reflexes 2+ at biceps, no tremors  - Psychiatric: normal mood and affect    Data Reviewed:   - none new for review    Assessment/Plan:   1) Hyperthyroidism > Pt is clinically euthyroid on the MMI 5mg daily. She has not required any of the Atenolol. Will order TFTs and adjust her MMI dose as able. Will order a CBC and CMP. Pt voices understanding and agreement with the plan. RTC 4 months.         Copy sent to:  Dr. Alonzo Shah

## 2021-10-01 DIAGNOSIS — E05.90 HYPERTHYROIDISM: Primary | ICD-10-CM

## 2021-10-01 LAB
ALBUMIN SERPL-MCNC: 4.5 G/DL (ref 3.8–4.8)
ALBUMIN/GLOB SERPL: 1.6 {RATIO} (ref 1.2–2.2)
ALP SERPL-CCNC: 71 IU/L (ref 44–121)
ALT SERPL-CCNC: 7 IU/L (ref 0–32)
AST SERPL-CCNC: 11 IU/L (ref 0–40)
BILIRUB SERPL-MCNC: 0.4 MG/DL (ref 0–1.2)
BUN SERPL-MCNC: 10 MG/DL (ref 6–24)
BUN/CREAT SERPL: 15 (ref 9–23)
CALCIUM SERPL-MCNC: 9.4 MG/DL (ref 8.7–10.2)
CHLORIDE SERPL-SCNC: 101 MMOL/L (ref 96–106)
CO2 SERPL-SCNC: 24 MMOL/L (ref 20–29)
CREAT SERPL-MCNC: 0.65 MG/DL (ref 0.57–1)
ERYTHROCYTE [DISTWIDTH] IN BLOOD BY AUTOMATED COUNT: 12.6 % (ref 11.7–15.4)
GLOBULIN SER CALC-MCNC: 2.8 G/DL (ref 1.5–4.5)
GLUCOSE SERPL-MCNC: 85 MG/DL (ref 65–99)
HCT VFR BLD AUTO: 35.9 % (ref 34–46.6)
HGB BLD-MCNC: 11.9 G/DL (ref 11.1–15.9)
MCH RBC QN AUTO: 28.7 PG (ref 26.6–33)
MCHC RBC AUTO-ENTMCNC: 33.1 G/DL (ref 31.5–35.7)
MCV RBC AUTO: 87 FL (ref 79–97)
PLATELET # BLD AUTO: 388 X10E3/UL (ref 150–450)
POTASSIUM SERPL-SCNC: 4.5 MMOL/L (ref 3.5–5.2)
PROT SERPL-MCNC: 7.3 G/DL (ref 6–8.5)
RBC # BLD AUTO: 4.14 X10E6/UL (ref 3.77–5.28)
SODIUM SERPL-SCNC: 138 MMOL/L (ref 134–144)
T3 SERPL-MCNC: 107 NG/DL (ref 71–180)
T4 FREE SERPL-MCNC: 1.24 NG/DL (ref 0.82–1.77)
TSH SERPL DL<=0.005 MIU/L-ACNC: 2.32 UIU/ML (ref 0.45–4.5)
WBC # BLD AUTO: 9.1 X10E3/UL (ref 3.4–10.8)

## 2022-02-01 DIAGNOSIS — E05.90 HYPERTHYROIDISM: ICD-10-CM

## 2022-02-08 ENCOUNTER — OFFICE VISIT (OUTPATIENT)
Dept: ENDOCRINOLOGY | Age: 42
End: 2022-02-08
Payer: MEDICAID

## 2022-02-08 VITALS
DIASTOLIC BLOOD PRESSURE: 78 MMHG | BODY MASS INDEX: 40.52 KG/M2 | HEART RATE: 97 BPM | HEIGHT: 60 IN | SYSTOLIC BLOOD PRESSURE: 128 MMHG | WEIGHT: 206.4 LBS

## 2022-02-08 DIAGNOSIS — E05.90 HYPERTHYROIDISM: Primary | ICD-10-CM

## 2022-02-08 PROCEDURE — 99214 OFFICE O/P EST MOD 30 MIN: CPT | Performed by: INTERNAL MEDICINE

## 2022-02-08 NOTE — LETTER
2/8/2022    Patient: Elizabeth Monaco   YOB: 1980   Date of Visit: 2/8/2022     Marj Sanchez MD  [address] [place] [address]  Via In Basket    Dear Marj Sanchez MD,      Thank you for referring Ms. Elizabeth Monaco to [address] [place] [address] for evaluation. My notes for this consultation are attached. If you have questions, please do not hesitate to call me. I look forward to following your patient along with you.       Sincerely,    Hay Calhoun MD

## 2022-02-08 NOTE — PROGRESS NOTES
Chief Complaint   Patient presents with    Thyroid Problem     pcp and pharmacy verified     History of Present Illness: Corby Galvez is a 39 y.o. female here for follow up of  hyperthyroidism. At our visit in February 2021 she was taking MMI 10mg every other day. Her TSH was 0.017 with FT4 1.12 and TT3 104. I changed her MMI to 5mg daily. At our last visit in September 2021 she was clinically and biochemically euthyroid on the MMI 5mg daily and I instructed her to continue that dose. She denies any recent illnesses, inquiries or hospitalizations. She has not received her COVID vaccination. Her son who has Autism is still in the 10th grade and will start 11th grade next year. Pt is still taking the MMI 5mg once per day. She has not needed any Atenolol, she has not had any palpitations. She denies issues of palpitations, SOB she notes she get chills and I will sometimes get hot flashes. She denies issues of diarrhea or constipation. She denies issues of dysphagia or dysphonia. She has not been needing the saline eye drops. She denies erythema or eye pain. She has follow up with the eye doctor in the near future. She saw her eye doctor in December 2021. \"She said everything was looking fine\". Current Outpatient Medications   Medication Sig    methIMAzole (TAPAZOLE) 5 mg tablet Take 1 Tablet by mouth daily. No current facility-administered medications for this visit. No Known Allergies  Review of Systems:  - Cardiovascular: no CP  - Neurological: no tremors  - Integumentary: skin is normal    Physical Examination:  Blood pressure 128/78, pulse 97, height 5' (1.524 m), weight 206 lb 6.4 oz (93.6 kg).   - General: pleasant, no distress, good eye contact   - Neck: no thyromegaly or thyroid bruits  - Cardiovascular: regular, normal rate, nl s1 and s2, no m/r/g   - Integumentary: skin is normal, no edema  - Neurological: reflexes 2+ at biceps, no tremors  - Psychiatric: normal mood and affect    Data Reviewed:   - none new for review    Assessment/Plan:   1) Hyperthyroidism > Pt is clinically euthyroid on the MMI 5mg daily. She has not required any of the Atenolol. Will order TFTs and adjust her MMI dose as able. Will order a CBC and CMP. Pt voices understanding and agreement with the plan. RTC 6 months. Follow-up and Dispositions    · Return in about 6 months (around 8/8/2022).          Copy sent to:  Dr. Gwendolyn Ashby

## 2022-02-14 ENCOUNTER — TELEPHONE (OUTPATIENT)
Dept: ENDOCRINOLOGY | Age: 42
End: 2022-02-14

## 2022-02-14 DIAGNOSIS — E05.90 HYPERTHYROIDISM: Primary | ICD-10-CM

## 2022-02-14 NOTE — TELEPHONE ENCOUNTER
Spoke with pt regarding her labs. Results for orders placed or performed in visit on 33/31/00   METABOLIC PANEL, COMPREHENSIVE   Result Value Ref [place]    Sodium 139 136 - 145 mmol/L    Potassium 4.7 3.5 - 5.1 mmol/L    Chloride 113 (H) 97 - 108 mmol/L    CO2 22 21 - 32 mmol/L    Anion gap 4 (L) 5 - 15 mmol/L    Glucose 94 65 - 100 mg/dL    BUN 12 6 - 20 MG/DL    Creatinine 0.92 0.55 - 1.02 MG/DL    BUN/Creatinine ratio 13 12 - 20      GFR est AA >60 >60 ml/min/1.73m2    GFR est non-AA >60 >60 ml/min/1.73m2    Calcium 9.1 8.5 - 10.1 MG/DL    Bilirubin, total 0.4 0.2 - 1.0 MG/DL    ALT (SGPT) 14 12 - 78 U/L    AST (SGOT) 19 15 - 37 U/L    Alk. phosphatase 60 45 - 117 U/L    Protein, total 7.6 6.4 - 8.2 g/dL    Albumin 4.0 3.5 - 5.0 g/dL    Globulin 3.6 2.0 - 4.0 g/dL    A-G Ratio 1.1 1.1 - 2.2     CBC W/O DIFF   Result Value Ref [place]    WBC 8.6 3.6 - 11.0 K/uL    RBC 4.04 3.80 - 5.20 M/uL    HGB 11.7 11.5 - 16.0 g/dL    HCT 36.6 35.0 - 47.0 %    MCV 90.6 80.0 - 99.0 FL    MCH 29.0 26.0 - 34.0 PG    MCHC 32.0 30.0 - 36.5 g/dL    RDW 12.7 11.5 - 14.5 %    PLATELET 382 130 - 574 K/uL    MPV 11.3 8.9 - 12.9 FL    NRBC 0.0 0  WBC    ABSOLUTE NRBC 0.00 0.00 - 0.01 K/uL   THYROID STIMULATING IMMUNOGLOBULIN   Result Value Ref [place]    Thyroid Stim Immunoglobulin 0.55 0.00 - 0.55 IU/L   TSH RECEPTOR AB   Result Value Ref [place]    Thyrotropin Receptor Ab, serum 2.29 (H) 0.00 - 1.75 IU/L   T3 TOTAL   Result Value Ref [place]    T3, total 88 71 - 180 ng/dL   T4, FREE   Result Value Ref [place]    T4, Free 1.1 0.8 - 1.5 NG/DL   TSH 3RD GENERATION   Result Value Ref [place]    TSH 1.49 0.36 - 3.74 uIU/mL     Pt instructed to decrease her MMI to 2.5mg daily. Will repeat her TFTs in 8 weeks. Pt voices understanding and agreement with the plan.

## 2022-03-19 PROBLEM — E66.01 SEVERE OBESITY (HCC): Status: ACTIVE | Noted: 2019-05-17

## 2022-04-11 DIAGNOSIS — E05.90 HYPERTHYROIDISM: ICD-10-CM

## 2022-07-27 ENCOUNTER — OFFICE VISIT (OUTPATIENT)
Dept: INTERNAL MEDICINE CLINIC | Age: 42
End: 2022-07-27
Payer: MEDICAID

## 2022-07-27 VITALS
SYSTOLIC BLOOD PRESSURE: 131 MMHG | OXYGEN SATURATION: 100 % | DIASTOLIC BLOOD PRESSURE: 83 MMHG | BODY MASS INDEX: 41.27 KG/M2 | HEIGHT: 60 IN | HEART RATE: 86 BPM | TEMPERATURE: 98.3 F | WEIGHT: 210.2 LBS | RESPIRATION RATE: 16 BRPM

## 2022-07-27 DIAGNOSIS — Z11.3 ROUTINE SCREENING FOR STI (SEXUALLY TRANSMITTED INFECTION): ICD-10-CM

## 2022-07-27 DIAGNOSIS — R60.9 DEPENDENT EDEMA: ICD-10-CM

## 2022-07-27 DIAGNOSIS — J45.30 MILD PERSISTENT ASTHMA, UNSPECIFIED WHETHER COMPLICATED: Primary | ICD-10-CM

## 2022-07-27 DIAGNOSIS — Z86.16 HISTORY OF COVID-19: ICD-10-CM

## 2022-07-27 DIAGNOSIS — Z00.00 WELL ADULT HEALTH CHECK: ICD-10-CM

## 2022-07-27 DIAGNOSIS — R73.03 PREDIABETES: ICD-10-CM

## 2022-07-27 DIAGNOSIS — E05.90 HYPERTHYROIDISM: ICD-10-CM

## 2022-07-27 PROCEDURE — 99215 OFFICE O/P EST HI 40 MIN: CPT | Performed by: INTERNAL MEDICINE

## 2022-07-27 RX ORDER — MONTELUKAST SODIUM 10 MG/1
10 TABLET ORAL
Qty: 30 TABLET | Refills: 3 | Status: SHIPPED | OUTPATIENT
Start: 2022-07-27

## 2022-07-27 RX ORDER — IBUPROFEN 800 MG/1
800 TABLET ORAL
COMMUNITY
Start: 2022-07-10

## 2022-07-27 RX ORDER — DICLOFENAC SODIUM 10 MG/G
GEL TOPICAL
COMMUNITY
Start: 2022-05-23

## 2022-07-27 RX ORDER — ALBUTEROL SULFATE 90 UG/1
2 AEROSOL, METERED RESPIRATORY (INHALATION)
Qty: 18 G | Refills: 2 | Status: SHIPPED | OUTPATIENT
Start: 2022-07-27

## 2022-07-27 NOTE — PROGRESS NOTES
Rm:17    Pt states that she had Covid Twice and want to know that her lungs and chest is good     Pt states that she has had chest pains sometimes and want to get check out     Pt states that both of her feet been swelling       Chief Complaint   Patient presents with    Physical     She wants to make sure that she is healthy and good        Visit Vitals  /83 (BP 1 Location: Left upper arm, BP Patient Position: Sitting, BP Cuff Size: Adult long)   Pulse 86   Temp 98.3 °F (36.8 °C) (Oral)   Resp 16   Ht 5' (1.524 m)   Wt 210 lb 3.2 oz (95.3 kg)   LMP 07/25/2022   SpO2 100%   BMI 41.05 kg/m²       1. Have you been to the ER, urgent care clinic since your last visit? Hospitalized since your last visit? Yes Where: [place] of may of this year for an asthma attach     2. Have you seen or consulted any other health care providers outside of the [address] [place] [address] since your last visit? Include any pap smears or colon screening.  No

## 2022-07-27 NOTE — PROGRESS NOTES
Kendra Vega (: 1980) is a 43 y.o. female, established patient, here for evaluation of the following chief complaint(s):  Chief Complaint   Patient presents with    Physical     She wants to make sure that she is healthy and good        Assessment and Plan:       ICD-10-CM ICD-9-CM    1. Mild persistent asthma, unspecified whether complicated  J44.47 778.27 montelukast (Singulair) 10 mg tablet      albuterol (PROVENTIL HFA, VENTOLIN HFA, PROAIR HFA) 90 mcg/actuation inhaler      2. Well adult health check  Z00.00 V70.0 CBC WITH AUTOMATED DIFF      METABOLIC PANEL, COMPREHENSIVE      HEMOGLOBIN A1C WITH EAG      LIPID PANEL      LIPID PANEL      HEMOGLOBIN A1C WITH EAG      METABOLIC PANEL, COMPREHENSIVE      CBC WITH AUTOMATED DIFF      3. Hyperthyroidism  E05.90 242.90 T4, FREE      T3 TOTAL      TSH 3RD GENERATION      TSH 3RD GENERATION      T3 TOTAL      T4, FREE      4. Routine screening for STI (sexually transmitted infection)  Z11.3 V74.5 HIV 1/2 AG/AB, 4TH GENERATION,W RFLX CONFIRM      T PALLIDUM SCREEN W/REFLEX      HEP B SURFACE AG      HEP B SURFACE AB      HEPATITIS B CORE AB, TOTAL      HEPATITIS C AB      CT/NG/T.VAGINALIS AMPLIFICATION      CT/NG/T.VAGINALIS AMPLIFICATION      HEPATITIS C AB      HEPATITIS B CORE AB, TOTAL      HEP B SURFACE AB      HEP B SURFACE AG      T PALLIDUM SCREEN W/REFLEX      HIV 1/2 AG/AB, 4TH GENERATION,W RFLX CONFIRM      5. Dependent edema  R60.9 782.3 URINALYSIS W/ RFLX MICROSCOPIC      ECHO ADULT COMPLETE      URINALYSIS W/ RFLX MICROSCOPIC      6. History of COVID-19  Z86.16 V12.09 ECHO ADULT COMPLETE      7. Prediabetes  R73.03 790.29           1:  Medication(s), management and follow-up based on response reviewed at visit. Symptomatic management reviewed at visit. 2-5:  Lab monitoring reviewed. 5,6:  Imaging reviewed. Follow-up and Dispositions    Return in about 2 weeks (around 8/10/2022) for medication follow-up, lab review. lab results and schedule of future lab studies reviewed with patient  reviewed medications and side effects in detail  radiology results and schedule of future radiology studies reviewed with patient    Plan and evaluation (above) reviewed with pt at visit  Patient voiced understanding of plan and provided with time to ask/review questions. After Visit Summary (AVS) provided to pt after visit with additional instructions as needed/reviewed. Future Appointments   Date Time Provider Karen Moody   8/9/2022 12:10 PM Rose Marie Flores MD RDE [facility] [address] BS AMB   --Updated future visits after patient check-out. On this date 07/27/2022 I have spent 45 minutes reviewing previous notes, test results and face to face with the patient discussing the diagnosis and importance of compliance with the treatment plan as well as documenting on the day of the visit. History of Present Illness:     Notes (nursing/rooming note copied below in italics):  Pt states that she had Covid Twice and want to know that her lungs and chest is good  Pt states that she has had chest pains sometimes and want to get check out  Pt states that both of her feet been swelling       LOV here 8-29-19 with me for back pain. Had PT and follow-up with Dr. Sacha Park after that visit. Has seen Ortho in interim also. Saw Dr. Teresa Alonzo for hyperthyroidism last Feb 2022. Had Thyroid labs and H18, CMP then---latter were both normal.    No prior lipids in our system/records. Lab Results   Component Value Date/Time    Hemoglobin A1c 6.1 (H) 02/12/2016 03:16 PM       Reviewed and will update screening labs today with labs for upcoming endocrine visit. May have been having CP for possibly more than one year. May be in middle, may be on sides. She will have some dizziness with this at times. Sometimes this is associated with ringing in her ears. She has asthma. Typically gets when lying done.   Not having routinely with exercise or exertion or things that increase her respiratory work or breathing. She does not note may specific GERD symptoms. But has FH of this. She takes no meds for GERD. CP will be intermittent. Reviewed no worrisome signs or RF for coronary disease. She has not been managing well. She had an asthma attack in [place]--this was 5-7-22. She had ER eval and did x-ray then which was normal.  Treated with albuterol and PO steroid, and using an albuterol ihaler PRN. She uses as needed--with any wheezing. She can hear \"whistling\" when lying down. She will take albuterol as needed with chest tightness. She thinks in 1 week, she will have \"whistling\" nightly. She will use inhaler up to 3-4 times a week, 1-2 times those days. She notes swelling in legs at night. Better/normal in AM.      Nursing screenings reviewed by provider at visit. Prior to Admission medications    Medication Sig Start Date End Date Taking? Authorizing Provider   ibuprofen (MOTRIN) 800 mg tablet Take 800 mg by mouth three (3) times daily as needed. 7/10/22  Yes Provider, Historical   methIMAzole (TAPAZOLE) 5 mg tablet Take 0.5 Tablets by mouth daily. 5/16/22  Yes Marko Ashby MD   diclofenac (VOLTAREN) 1 % gel APPLY 3-4 GRAMS TOPICALLY TO AFFECTED AREA TWICE A DAY AS DIRECTED 5/23/22   Provider, Historical        ROS    Vitals:    07/27/22 0941   BP: 131/83   Pulse: 86   Resp: 16   Temp: 98.3 °F (36.8 °C)   TempSrc: Oral   SpO2: 100%   Weight: 210 lb 3.2 oz (95.3 kg)   Height: 5' (1.524 m)   PainSc:   0 - No pain   LMP: 07/25/2022      Body mass index is 41.05 kg/m². Physical Exam:     Physical Exam  Vitals and nursing note reviewed. Constitutional:       General: She is not in acute distress. Appearance: Normal appearance. She is well-developed. She is not diaphoretic. HENT:      Head: Normocephalic and atraumatic. Eyes:      General: No scleral icterus. Right eye: No discharge. Left eye: No discharge. Conjunctiva/sclera: Conjunctivae normal.   Cardiovascular:      Rate and Rhythm: Normal rate and regular rhythm. Pulses: Normal pulses. Heart sounds: Normal heart sounds. No murmur heard. No friction rub. No gallop. Pulmonary:      Effort: Pulmonary effort is normal. No respiratory distress. Breath sounds: Normal breath sounds. No stridor. No wheezing, rhonchi or rales. Comments: No forced expiratory wheezes noted bilaterally. Abdominal:      General: Bowel sounds are normal. There is no distension. Palpations: Abdomen is soft. Tenderness: There is no abdominal tenderness. There is no guarding. Musculoskeletal:         General: No swelling, tenderness, deformity or signs of injury. Cervical back: Neck supple. No rigidity. No muscular tenderness. Right lower leg: No edema. Left lower leg: No edema. Lymphadenopathy:      Cervical: No cervical adenopathy. Skin:     General: Skin is warm. Coloration: Skin is not jaundiced or pale. Findings: No bruising, erythema or rash. Neurological:      General: No focal deficit present. Mental Status: She is alert. Motor: No abnormal muscle tone. Coordination: Coordination normal.      Gait: Gait normal.   Psychiatric:         Mood and Affect: Mood normal.         Behavior: Behavior normal.         Thought Content: Thought content normal.         Judgment: Judgment normal.       An electronic signature was used to authenticate this note.   -- Sue Hampton MD

## 2022-07-31 LAB
ALBUMIN SERPL-MCNC: 4.2 G/DL (ref 3.8–4.8)
ALBUMIN/GLOB SERPL: 1.4 {RATIO} (ref 1.2–2.2)
ALP SERPL-CCNC: 59 IU/L (ref 44–121)
ALT SERPL-CCNC: 8 IU/L (ref 0–32)
APPEARANCE UR: CLEAR
AST SERPL-CCNC: 7 IU/L (ref 0–40)
BACTERIA #/AREA URNS HPF: ABNORMAL /[HPF]
BASOPHILS # BLD AUTO: 0 X10E3/UL (ref 0–0.2)
BASOPHILS NFR BLD AUTO: 0 %
BILIRUB SERPL-MCNC: <0.2 MG/DL (ref 0–1.2)
BILIRUB UR QL STRIP: NEGATIVE
BUN SERPL-MCNC: 11 MG/DL (ref 6–24)
BUN/CREAT SERPL: 17 (ref 9–23)
C TRACH RRNA SPEC QL NAA+PROBE: NEGATIVE
CALCIUM SERPL-MCNC: 8.9 MG/DL (ref 8.7–10.2)
CASTS URNS QL MICRO: ABNORMAL /LPF
CHLORIDE SERPL-SCNC: 101 MMOL/L (ref 96–106)
CHOLEST SERPL-MCNC: 162 MG/DL (ref 100–199)
CO2 SERPL-SCNC: 23 MMOL/L (ref 20–29)
COLOR UR: ABNORMAL
CREAT SERPL-MCNC: 0.66 MG/DL (ref 0.57–1)
EOSINOPHIL # BLD AUTO: 0.1 X10E3/UL (ref 0–0.4)
EOSINOPHIL NFR BLD AUTO: 1 %
EPI CELLS #/AREA URNS HPF: ABNORMAL /HPF (ref 0–10)
ERYTHROCYTE [DISTWIDTH] IN BLOOD BY AUTOMATED COUNT: 12.1 % (ref 11.7–15.4)
EST. AVERAGE GLUCOSE BLD GHB EST-MCNC: 126 MG/DL
GLOBULIN SER CALC-MCNC: 3.1 G/DL (ref 1.5–4.5)
GLUCOSE SERPL-MCNC: 92 MG/DL (ref 65–99)
GLUCOSE UR QL STRIP: NEGATIVE
HBA1C MFR BLD: 6 % (ref 4.8–5.6)
HBV CORE AB SERPL QL IA: NEGATIVE
HBV SURFACE AB SER QL: REACTIVE
HBV SURFACE AG SERPL QL IA: NEGATIVE
HCT VFR BLD AUTO: 35.1 % (ref 34–46.6)
HCV AB S/CO SERPL IA: 0.2 S/CO RATIO (ref 0–0.9)
HDLC SERPL-MCNC: 56 MG/DL
HGB BLD-MCNC: 12.2 G/DL (ref 11.1–15.9)
HGB UR QL STRIP: ABNORMAL
HIV 1+2 AB+HIV1 P24 AG SERPL QL IA: NON REACTIVE
IMM GRANULOCYTES # BLD AUTO: 0 X10E3/UL (ref 0–0.1)
IMM GRANULOCYTES NFR BLD AUTO: 0 %
KETONES UR QL STRIP: NEGATIVE
LDLC SERPL CALC-MCNC: 95 MG/DL (ref 0–99)
LEUKOCYTE ESTERASE UR QL STRIP: NEGATIVE
LYMPHOCYTES # BLD AUTO: 2.5 X10E3/UL (ref 0.7–3.1)
LYMPHOCYTES NFR BLD AUTO: 30 %
MCH RBC QN AUTO: 31 PG (ref 26.6–33)
MCHC RBC AUTO-ENTMCNC: 34.8 G/DL (ref 31.5–35.7)
MCV RBC AUTO: 89 FL (ref 79–97)
MICRO URNS: ABNORMAL
MONOCYTES # BLD AUTO: 0.4 X10E3/UL (ref 0.1–0.9)
MONOCYTES NFR BLD AUTO: 5 %
N GONORRHOEA RRNA SPEC QL NAA+PROBE: NEGATIVE
NEUTROPHILS # BLD AUTO: 5.3 X10E3/UL (ref 1.4–7)
NEUTROPHILS NFR BLD AUTO: 64 %
NITRITE UR QL STRIP: NEGATIVE
PH UR STRIP: 8.5 [PH] (ref 5–7.5)
PLATELET # BLD AUTO: 370 X10E3/UL (ref 150–450)
POTASSIUM SERPL-SCNC: 4.5 MMOL/L (ref 3.5–5.2)
PROT SERPL-MCNC: 7.3 G/DL (ref 6–8.5)
PROT UR QL STRIP: ABNORMAL
RBC # BLD AUTO: 3.94 X10E6/UL (ref 3.77–5.28)
RBC #/AREA URNS HPF: ABNORMAL /HPF (ref 0–2)
SODIUM SERPL-SCNC: 139 MMOL/L (ref 134–144)
SP GR UR STRIP: 1.01 (ref 1–1.03)
T VAGINALIS RRNA SPEC QL NAA+PROBE: NEGATIVE
T3 SERPL-MCNC: 111 NG/DL (ref 71–180)
T4 FREE SERPL-MCNC: 1.49 NG/DL (ref 0.82–1.77)
TREPONEMA PALLIDUM IGG+IGM AB [PRESENCE] IN SERUM OR PLASMA BY IMMUNOASSAY: NON REACTIVE
TRIGL SERPL-MCNC: 55 MG/DL (ref 0–149)
TSH SERPL DL<=0.005 MIU/L-ACNC: 1.42 UIU/ML (ref 0.45–4.5)
UROBILINOGEN UR STRIP-MCNC: 0.2 MG/DL (ref 0.2–1)
VLDLC SERPL CALC-MCNC: 11 MG/DL (ref 5–40)
WBC # BLD AUTO: 8.4 X10E3/UL (ref 3.4–10.8)
WBC #/AREA URNS HPF: ABNORMAL /HPF (ref 0–5)

## 2022-08-09 ENCOUNTER — OFFICE VISIT (OUTPATIENT)
Dept: ENDOCRINOLOGY | Age: 42
End: 2022-08-09
Payer: MEDICAID

## 2022-08-09 VITALS
DIASTOLIC BLOOD PRESSURE: 76 MMHG | BODY MASS INDEX: 41.5 KG/M2 | HEIGHT: 60 IN | HEART RATE: 80 BPM | WEIGHT: 211.4 LBS | SYSTOLIC BLOOD PRESSURE: 130 MMHG

## 2022-08-09 DIAGNOSIS — E05.90 HYPERTHYROIDISM: Primary | ICD-10-CM

## 2022-08-09 PROCEDURE — 99214 OFFICE O/P EST MOD 30 MIN: CPT | Performed by: INTERNAL MEDICINE

## 2022-08-09 RX ORDER — METHIMAZOLE 5 MG/1
2.5 TABLET ORAL DAILY
Qty: 90 TABLET | Refills: 1 | Status: SHIPPED | OUTPATIENT
Start: 2022-08-09

## 2022-08-09 NOTE — PROGRESS NOTES
Chief Complaint   Patient presents with    Thyroid Problem     Pcp and pharmacy verifiede       History of Present Illness: Miryam Martinez is a 43 y.o. female here for follow up of  hyperthyroidism. At our visit in February 2021 she was taking MMI 10mg every other day. Her TSH was 0.017 with FT4 1.12 and TT3 104. I changed her MMI to 5mg daily. At our last visit in September 2021 she was clinically and biochemically euthyroid on the MMI 5mg daily and I instructed her to continue that dose. At our visit in February 2022 her TSH was 1.49 with FT4 of 1.1 and TT3 of 88, her TSI was not elevated, but her TRAb was at 2.29. I instructed her to decrease her MMI to 2.5mg daily. Her TSH last week was 1.42 with FT4 of 1.49 and TT3 of 111. Her CBC and CMP were unremarkable. Pt contracted COVID in June 2022. She has not received her COVID vaccination. Her son who has Autism is still in the 11th grade and is now working at the [employer]. Pt is still taking the MMI 2.5mg once per day. She has not needed any Atenolol, she has not had any palpitations. She denies issues of palpitations, SOB she notes that the chills and hot flashes have been less severe. She denies issues of diarrhea or constipation. She denies issues of dysphagia or dysphonia. She has been needing the saline eye drops. She denies erythema or eye pain. She has follow up with the eye doctor in the near future. She saw her eye doctor in December 2021. \"She said everything was looking fine\". Current Outpatient Medications   Medication Sig    methIMAzole (TAPAZOLE) 5 mg tablet Take 0.5 Tablets by mouth in the morning. diclofenac (VOLTAREN) 1 % gel APPLY 3-4 GRAMS TOPICALLY TO AFFECTED AREA TWICE A DAY AS DIRECTED    ibuprofen (MOTRIN) 800 mg tablet Take 800 mg by mouth three (3) times daily as needed. montelukast (Singulair) 10 mg tablet Take 1 Tablet by mouth nightly.     albuterol (PROVENTIL HFA, VENTOLIN HFA, PROAIR HFA) 90 mcg/actuation inhaler Take 2 Puffs by inhalation every four (4) hours as needed for Wheezing. No current facility-administered medications for this visit. Not on File  Review of Systems:  - Cardiovascular: no CP  - Neurological: no tremors  - Integumentary: skin is normal    Physical Examination:  Blood pressure 130/76, pulse 80, height 5' (1.524 m), weight 211 lb 6.4 oz (95.9 kg), last menstrual period 07/25/2022. General: pleasant, no distress, good eye contact   Neck: no thyromegaly or thyroid bruits  Cardiovascular: regular, normal rate, nl s1 and s2, no m/r/g   Integumentary: skin is normal, no edema  Neurological: reflexes 2+ at biceps, no tremors  Psychiatric: normal mood and affect    Data Reviewed:   Component      Latest Ref Rng & Units 7/27/2022   WBC      3.4 - 10.8 x10E3/uL 8.4   RBC      3.77 - 5.28 x10E6/uL 3.94   HGB      11.1 - 15.9 g/dL 12.2   HCT      34.0 - 46.6 % 35.1   MCV      79 - 97 fL 89   MCH      26.6 - 33.0 pg 31.0   MCHC      31.5 - 35.7 g/dL 34.8   RDW      11.7 - 15.4 % 12.1   PLATELET      941 - 063 x10E3/uL 370   NEUTROPHILS      Not Estab. % 64   Lymphocytes      Not Estab. % 30   MONOCYTES      Not Estab. % 5   EOSINOPHILS      Not Estab. % 1   BASOPHILS      Not Estab. % 0   ABS. NEUTROPHILS      1.4 - 7.0 x10E3/uL 5.3   Abs Lymphocytes      0.7 - 3.1 x10E3/uL 2.5   ABS. MONOCYTES      0.1 - 0.9 x10E3/uL 0.4   ABS. EOSINOPHILS      0.0 - 0.4 x10E3/uL 0.1   ABS. BASOPHILS      0.0 - 0.2 x10E3/uL 0.0   IMMATURE GRANULOCYTES      Not Estab. % 0   ABS. IMM.  GRANS.      0.0 - 0.1 x10E3/uL 0.0   Glucose      65 - 99 mg/dL 92   BUN      6 - 24 mg/dL 11   Creatinine      0.57 - 1.00 mg/dL 0.66   BUN/Creatinine ratio      9 - 23 17   Sodium      134 - 144 mmol/L 139   Potassium      3.5 - 5.2 mmol/L 4.5   Chloride      96 - 106 mmol/L 101   CO2      20 - 29 mmol/L 23   Calcium      8.7 - 10.2 mg/dL 8.9   Protein, total      6.0 - 8.5 g/dL 7.3   Albumin      3.8 - 4.8 g/dL 4.2   GLOBULIN, TOTAL      1.5 - 4.5 g/dL 3.1   A-G Ratio      1.2 - 2.2 1.4   Bilirubin, total      0.0 - 1.2 mg/dL <0.2   Alk. phosphatase      44 - 121 IU/L 59   AST      0 - 40 IU/L 7   ALT      0 - 32 IU/L 8   TSH      0.450 - 4.500 uIU/mL 1.420   T3, total      71 - 180 ng/dL 111   T4, Free      0.82 - 1.77 ng/dL 1.49       Assessment/Plan:   1) Hyperthyroidism > Pt is clinically and biochemically euthyroid on the MMI 2.5mg daily. Pt to continue the MMI 2.5mg daily. Pt voices understanding and agreement with the plan. RTC 6 months. Follow-up and Dispositions    Return in about 6 months (around 2/9/2023).            Copy sent to:  Dr. Barbara Valdes

## 2022-08-09 NOTE — LETTER
8/9/2022    Patient: Vicky Staples   YOB: 1980   Date of Visit: 8/9/2022     Nito Temple MD  [address] [place] [address]  Via In Basket    Dear Nito Temple MD,      Thank you for referring Ms. Vicky Staples to [address] [place] [address] for evaluation. My notes for this consultation are attached. If you have questions, please do not hesitate to call me. I look forward to following your patient along with you.       Sincerely,    Hodges Leyden, MD

## 2022-08-10 ENCOUNTER — OFFICE VISIT (OUTPATIENT)
Dept: INTERNAL MEDICINE CLINIC | Age: 42
End: 2022-08-10
Payer: MEDICAID

## 2022-08-10 VITALS
OXYGEN SATURATION: 100 % | BODY MASS INDEX: 41.7 KG/M2 | WEIGHT: 212.4 LBS | RESPIRATION RATE: 16 BRPM | HEART RATE: 77 BPM | DIASTOLIC BLOOD PRESSURE: 80 MMHG | HEIGHT: 60 IN | SYSTOLIC BLOOD PRESSURE: 129 MMHG | TEMPERATURE: 99.5 F

## 2022-08-10 DIAGNOSIS — R60.9 DEPENDENT EDEMA: Primary | ICD-10-CM

## 2022-08-10 DIAGNOSIS — R73.09 ELEVATED HEMOGLOBIN A1C: ICD-10-CM

## 2022-08-10 DIAGNOSIS — E05.90 HYPERTHYROIDISM: ICD-10-CM

## 2022-08-10 PROCEDURE — 99214 OFFICE O/P EST MOD 30 MIN: CPT | Performed by: INTERNAL MEDICINE

## 2022-08-10 NOTE — PROGRESS NOTES
Jonathan Garcia (: 1980) is a 43 y.o. female, established patient, here for evaluation of the following chief complaint(s):  Chief Complaint   Patient presents with    Medication Refill     And labs       Assessment and Plan:       ICD-10-CM ICD-9-CM    1. Dependent edema  R60.9 782.3 URINALYSIS W/ RFLX MICROSCOPIC      URINALYSIS W/ RFLX MICROSCOPIC      CANCELED: PROTEIN/CREATININE RATIO, URINE      CANCELED: PROTEIN/CREATININE RATIO, URINE      2. Elevated hemoglobin A1c  R73.09 790.29       3. BMI 40.0-44.9, adult (HCC)  Z68.41 V85.41 REFERRAL TO NUTRITION      4. Hyperthyroidism  E05.90 242.90            Repeat urine protein reviewed--2+ on lab specimen last visit. Send with protein-creatinine ratio. Renal eval reviewed if elevated. Not called to schedule echo. Info provided to pt--nursing to contact to have them call her to schedule. 2.  Stable from 2016--plans to address further with nutrition eval for BMI. 3.  Referral(s) and referral coordination reviewed with patient at visit. 4.  Managing/monitoring with endocrine as below. Follow-up and Dispositions    Return in about 6 months (around 2/10/2023) for medication follow-up, non-fasting labs. lab results and schedule of future lab studies reviewed with patient  reviewed medications and side effects in detail  radiology results and schedule of future radiology studies reviewed with patient    Plan and evaluation (above) reviewed with pt at visit  Patient voiced understanding of plan and provided with time to ask/review questions. After Visit Summary (AVS) provided to pt after visit with additional instructions as needed/reviewed.       Future Appointments   Date Time Provider Karen Moody   2022 10:00 AM ECHOCARDIOGRAM ROOM [facility] [address] REG   2023 11:50 AM Mihai Self MD RDE [facility] [address] BS AMB   2/10/2023  9:00 AM Vinnie Carson MD CPIM BS AMB   --Updated future visits after patient check-out. History of Present Illness:     Notes (nursing/rooming note copied below in italics):  As above    Here for medication follow-up. Last visit and labs from 7-27 reviewed at visit. She had reviewed in [employer], but still marked as just \"released\". Endocrine provider Dr. Idalia Hagen reviewed thyroid labs and continued current methimazole dose. She notes she has not had any problems with methimazole. Reviewed urine testing, A1c and proteinuria evaluation. Component      Latest Ref Rng & Units 7/27/2022          12:00 AM   Specific Gravity      1.005 - 1.030 1.012   pH (UA)      5.0 - 7.5 8.5 (H)   Color      Yellow Orange   Appearance      Clear Clear   Leukocyte Esterase      Negative Negative   Protein      Negative/Trace 2+ (A)   Glucose      Negative Negative   Ketone      Negative Negative   Blood      Negative 3+ (A)   Bilirubin      Negative Negative   Urobilinogen      0.2 - 1.0 mg/dL 0.2   Nitrites      Negative Negative       Component      Latest Ref Rng & Units 7/27/2022          12:00 AM   WBC      0 - 5 /hpf 6-10 (A)   RBC      0 - 2 /hpf 11-30 (A)   Epithelial cells      0 - 10 /hpf 0-10   Casts      None seen /lpf None seen   Bacteria      None seen/Few Moderate (A)         She notes had reviewed labs and no concerns. Lab Results   Component Value Date/Time    Hemoglobin A1c 6.0 (H) 07/27/2022 12:00 AM       She has been monitoring CP--once on left in lateral chest/anterior shoulder, and once in right side in same area--in past week. Not planning other intervention at this time, due to no RF's. Lab Results   Component Value Date/Time    Cholesterol, total 162 07/27/2022 12:00 AM    HDL Cholesterol 56 07/27/2022 12:00 AM    LDL, calculated 95 07/27/2022 12:00 AM    VLDL, calculated 11 07/27/2022 12:00 AM    Triglyceride 55 07/27/2022 12:00 AM       Nursing screenings reviewed by provider at visit.        Prior to Admission medications    Medication Sig Start Date End Date Taking? Authorizing Provider   methIMAzole (TAPAZOLE) 5 mg tablet Take 0.5 Tablets by mouth in the morning. 8/9/22  Yes Austin Ly MD   diclofenac (VOLTAREN) 1 % gel APPLY 3-4 GRAMS TOPICALLY TO AFFECTED AREA TWICE A DAY AS DIRECTED 5/23/22  Yes Provider, Historical   ibuprofen (MOTRIN) 800 mg tablet Take 800 mg by mouth three (3) times daily as needed. 7/10/22  Yes Provider, Historical   montelukast (Singulair) 10 mg tablet Take 1 Tablet by mouth nightly. 7/27/22  Yes Ochoa Cotto MD   albuterol (PROVENTIL HFA, VENTOLIN HFA, PROAIR HFA) 90 mcg/actuation inhaler Take 2 Puffs by inhalation every four (4) hours as needed for Wheezing. 7/27/22  Yes Ochoa Cotto MD        ROS    Vitals:    08/10/22 0852   BP: 129/80   Pulse: 77   Resp: 16   Temp: 99.5 °F (37.5 °C)   TempSrc: Oral   SpO2: 100%   Weight: 212 lb 6.4 oz (96.3 kg)   Height: 5' (1.524 m)   PainSc:   0 - No pain   LMP: 07/25/2022      Body mass index is 41.48 kg/m². Physical Exam:     Physical Exam  Vitals and nursing note reviewed. Constitutional:       General: She is not in acute distress. Appearance: Normal appearance. She is well-developed. She is not diaphoretic. HENT:      Head: Normocephalic and atraumatic. Eyes:      General: No scleral icterus. Right eye: No discharge. Left eye: No discharge. Conjunctiva/sclera: Conjunctivae normal.   Cardiovascular:      Rate and Rhythm: Normal rate and regular rhythm. Pulses: Normal pulses. Heart sounds: Normal heart sounds. No murmur heard. No friction rub. No gallop. Pulmonary:      Effort: Pulmonary effort is normal. No respiratory distress. Breath sounds: Normal breath sounds. No stridor. No wheezing, rhonchi or rales. Abdominal:      General: Bowel sounds are normal. There is no distension. Palpations: Abdomen is soft. Tenderness: There is no abdominal tenderness. There is no guarding.    Musculoskeletal: General: No swelling, tenderness, deformity or signs of injury. Right lower leg: No edema. Left lower leg: No edema. Skin:     General: Skin is warm. Coloration: Skin is not jaundiced or pale. Findings: No bruising, erythema or rash. Neurological:      General: No focal deficit present. Mental Status: She is alert. Motor: No abnormal muscle tone. Coordination: Coordination normal.      Gait: Gait normal.   Psychiatric:         Mood and Affect: Mood normal.         Behavior: Behavior normal.         Thought Content: Thought content normal.         Judgment: Judgment normal.       An electronic signature was used to authenticate this note.   -- Deepak Melvin MD

## 2022-08-10 NOTE — PROGRESS NOTES
Rm: 18      Chief Complaint   Patient presents with    Medication Refill     And labs       Visit Vitals  /80 (BP 1 Location: Left upper arm, BP Patient Position: Sitting, BP Cuff Size: Adult long)   Pulse 77   Temp 99.5 °F (37.5 °C) (Oral)   Resp 16   Ht 5' (1.524 m)   Wt 212 lb 6.4 oz (96.3 kg)   LMP 07/25/2022   SpO2 100%   BMI 41.48 kg/m²       1. Have you been to the ER, urgent care clinic since your last visit? Hospitalized since your last visit? No    2. Have you seen or consulted any other health care providers outside of the [address] [place] [address] since your last visit? Include any pap smears or colon screening.  No

## 2022-08-11 LAB
APPEARANCE UR: CLEAR
BACTERIA #/AREA URNS HPF: ABNORMAL /[HPF]
BILIRUB UR QL STRIP: NEGATIVE
CASTS URNS QL MICRO: ABNORMAL /LPF
COLOR UR: YELLOW
EPI CELLS #/AREA URNS HPF: ABNORMAL /HPF (ref 0–10)
GLUCOSE UR QL STRIP: NEGATIVE
HGB UR QL STRIP: NEGATIVE
KETONES UR QL STRIP: NEGATIVE
LEUKOCYTE ESTERASE UR QL STRIP: ABNORMAL
MICRO URNS: ABNORMAL
NITRITE UR QL STRIP: NEGATIVE
PH UR STRIP: 8 [PH] (ref 5–7.5)
PROT UR QL STRIP: NEGATIVE
RBC #/AREA URNS HPF: ABNORMAL /HPF (ref 0–2)
SP GR UR STRIP: 1.01 (ref 1–1.03)
UROBILINOGEN UR STRIP-MCNC: 0.2 MG/DL (ref 0.2–1)
WBC #/AREA URNS HPF: ABNORMAL /HPF (ref 0–5)

## 2022-08-12 LAB
CREAT UR-MCNC: 54.1 MG/DL
PROT UR-MCNC: 13.4 MG/DL
PROT/CREAT UR: 248 MG/G CREAT (ref 0–200)

## 2022-09-16 ENCOUNTER — HOSPITAL ENCOUNTER (OUTPATIENT)
Dept: NON INVASIVE DIAGNOSTICS | Age: 42
Discharge: HOME | End: 2022-09-16
Attending: INTERNAL MEDICINE
Payer: MEDICAID

## 2022-09-16 VITALS
DIASTOLIC BLOOD PRESSURE: 80 MMHG | WEIGHT: 212.3 LBS | BODY MASS INDEX: 41.68 KG/M2 | HEIGHT: 60 IN | SYSTOLIC BLOOD PRESSURE: 129 MMHG

## 2022-09-16 DIAGNOSIS — Z86.16 HISTORY OF COVID-19: ICD-10-CM

## 2022-09-16 DIAGNOSIS — R60.9 DEPENDENT EDEMA: ICD-10-CM

## 2022-09-16 LAB
ECHO AV AREA PEAK VELOCITY: 2.1 CM2
ECHO AV AREA VTI: 2.7 CM2
ECHO AV AREA/BSA PEAK VELOCITY: 1.1 CM2/M2
ECHO AV AREA/BSA VTI: 1.4 CM2/M2
ECHO AV MEAN GRADIENT: 3 MMHG
ECHO AV MEAN VELOCITY: 0.8 M/S
ECHO AV PEAK GRADIENT: 5 MMHG
ECHO AV PEAK VELOCITY: 1.1 M/S
ECHO AV VELOCITY RATIO: 0.73
ECHO AV VTI: 19.9 CM
ECHO EST RA PRESSURE: 10 MMHG
ECHO LA DIAMETER INDEX: 1.36 CM/M2
ECHO LA DIAMETER: 2.6 CM
ECHO LA VOL 4C: 41 ML (ref 22–52)
ECHO LA VOLUME INDEX A4C: 21 ML/M2 (ref 16–34)
ECHO LV FRACTIONAL SHORTENING: 23 % (ref 28–44)
ECHO LV INTERNAL DIMENSION DIASTOLE INDEX: 2.51 CM/M2
ECHO LV INTERNAL DIMENSION DIASTOLIC: 4.8 CM (ref 3.9–5.3)
ECHO LV INTERNAL DIMENSION SYSTOLIC INDEX: 1.94 CM/M2
ECHO LV INTERNAL DIMENSION SYSTOLIC: 3.7 CM
ECHO LV IVSD: 0.7 CM (ref 0.6–0.9)
ECHO LV MASS 2D: 106.9 G (ref 67–162)
ECHO LV MASS INDEX 2D: 56 G/M2 (ref 43–95)
ECHO LV POSTERIOR WALL DIASTOLIC: 0.7 CM (ref 0.6–0.9)
ECHO LV RELATIVE WALL THICKNESS RATIO: 0.29
ECHO LVOT AREA: 3.1 CM2
ECHO LVOT AV VTI INDEX: 0.85
ECHO LVOT DIAM: 2 CM
ECHO LVOT MEAN GRADIENT: 1 MMHG
ECHO LVOT PEAK GRADIENT: 2 MMHG
ECHO LVOT PEAK VELOCITY: 0.8 M/S
ECHO LVOT STROKE VOLUME INDEX: 27.9 ML/M2
ECHO LVOT SV: 53.4 ML
ECHO LVOT VTI: 17 CM
ECHO MV A VELOCITY: 1.01 M/S
ECHO MV AREA VTI: 2.1 CM2
ECHO MV E VELOCITY: 0.15 M/S
ECHO MV E/A RATIO: 0.15
ECHO MV LVOT VTI INDEX: 1.5
ECHO MV MAX VELOCITY: 1.1 M/S
ECHO MV MEAN GRADIENT: 2 MMHG
ECHO MV MEAN VELOCITY: 0.7 M/S
ECHO MV PEAK GRADIENT: 5 MMHG
ECHO MV REGURGITANT PEAK GRADIENT: 112 MMHG
ECHO MV REGURGITANT PEAK VELOCITY: 5.3 M/S
ECHO MV VTI: 25.5 CM
ECHO PV MAX VELOCITY: 0.8 M/S
ECHO PV PEAK GRADIENT: 3 MMHG
ECHO RIGHT VENTRICULAR SYSTOLIC PRESSURE (RVSP): 34 MMHG
ECHO RV FREE WALL PEAK S': 11 CM/S
ECHO RV TAPSE: 1.5 CM (ref 1.7–?)
ECHO TV REGURGITANT MAX VELOCITY: 2.45 M/S
ECHO TV REGURGITANT PEAK GRADIENT: 24 MMHG

## 2022-09-16 PROCEDURE — 93306 TTE W/DOPPLER COMPLETE: CPT

## 2022-11-02 DIAGNOSIS — J45.30 MILD PERSISTENT ASTHMA, UNSPECIFIED WHETHER COMPLICATED: ICD-10-CM

## 2022-11-13 RX ORDER — ALBUTEROL SULFATE 90 UG/1
AEROSOL, METERED RESPIRATORY (INHALATION)
Qty: 18 EACH | Refills: 1 | Status: SHIPPED | OUTPATIENT
Start: 2022-11-13

## 2022-11-14 NOTE — TELEPHONE ENCOUNTER
Refill request(s) approved--albuterol HFA 18gm with 1 refill. Refill protocol details (computer-generated) reviewed, as available. Last refill was 7-27-22 for #18g with 2 refill(s).

## 2023-01-22 DIAGNOSIS — J45.30 MILD PERSISTENT ASTHMA, UNSPECIFIED WHETHER COMPLICATED: ICD-10-CM

## 2023-02-01 DIAGNOSIS — E05.90 HYPERTHYROIDISM: ICD-10-CM

## 2023-02-05 RX ORDER — MONTELUKAST SODIUM 10 MG/1
10 TABLET ORAL
Qty: 30 TABLET | Refills: 5 | Status: SHIPPED | OUTPATIENT
Start: 2023-02-05

## 2023-02-09 ENCOUNTER — OFFICE VISIT (OUTPATIENT)
Dept: ENDOCRINOLOGY | Age: 43
End: 2023-02-09
Payer: MEDICAID

## 2023-02-09 VITALS
DIASTOLIC BLOOD PRESSURE: 75 MMHG | HEIGHT: 60 IN | SYSTOLIC BLOOD PRESSURE: 123 MMHG | BODY MASS INDEX: 41.9 KG/M2 | HEART RATE: 104 BPM | WEIGHT: 213.4 LBS

## 2023-02-09 DIAGNOSIS — E05.90 HYPERTHYROIDISM: Primary | ICD-10-CM

## 2023-02-09 PROCEDURE — 99214 OFFICE O/P EST MOD 30 MIN: CPT | Performed by: INTERNAL MEDICINE

## 2023-02-09 RX ORDER — SPIRONOLACTONE 50 MG/1
TABLET, FILM COATED ORAL
COMMUNITY
Start: 2023-01-22

## 2023-02-09 RX ORDER — TRETINOIN 0.5 MG/G
CREAM TOPICAL
COMMUNITY
Start: 2023-01-22

## 2023-02-09 NOTE — LETTER
2/9/2023    Patient: Calos Clemons   YOB: 1980   Date of Visit: 2/9/2023     Shirley Mead MD  [address] [place] [address]  Via In Basket    Dear Shirley Mead MD,      Thank you for referring Ms. Calos Clemons to [address] [place] [address] for evaluation. My notes for this consultation are attached. If you have questions, please do not hesitate to call me. I look forward to following your patient along with you.       Sincerely,    Elif Rousseau MD

## 2023-02-09 NOTE — PROGRESS NOTES
Chief Complaint   Patient presents with    Thyroid Problem     PCP and Pharmacy verified       History of Present Illness: Salvador Sherman is a 43 y.o. female here for follow up of  hyperthyroidism. Pt denies any recent illnesses, injuries or hospitalizations. Pt is still taking the MMI 2.5mg every day. Pt notes she is now taking spironolactone 50mg BID. \"I went to the dermatologist for my acne and they put me on that about a month ago. \"    Her son who has Autism is still in the 11th grade and is now working at [employer]. Pt is still taking the MMI 2.5mg once per day. She has not needed any Atenolol, she has not had any palpitations. She denies issues of palpitations, SOB, heat or cold intolerance. She denies issues of diarrhea or constipation. She denies issues of dysphagia or dysphonia. She has been needing the saline eye drops. She denies erythema or eye pain. She has follow up with the eye doctor in the near future. She saw her eye doctor in December 2021. \"She said everything was looking fine\". She has follow in next week with her eye doctor. Current Outpatient Medications   Medication Sig    spironolactone (ALDACTONE) 50 mg tablet TAKE 1 TABLET BY MOUTH DAILY FOR 2 WEEKS. THEN 2 TABS BY MOUTH DAILY    tretinoin (RETIN-A) 0.05 % topical cream APPLY CREAM EVERY OTHER NIGHT X 1 MONTH AND THEN NIGHTLY AS TOLERATED    montelukast (SINGULAIR) 10 mg tablet TAKE 1 TABLET BY MOUTH NIGHTLY    albuterol (Ventolin HFA) 90 mcg/actuation inhaler INHALE 2 PUFFS BY MOUTH EVERY 4 HOURS AS NEEDED FOR WHEEZE    methIMAzole (TAPAZOLE) 5 mg tablet Take 0.5 Tablets by mouth in the morning. diclofenac (VOLTAREN) 1 % gel APPLY 3-4 GRAMS TOPICALLY TO AFFECTED AREA TWICE A DAY AS DIRECTED    ibuprofen (MOTRIN) 800 mg tablet Take 800 mg by mouth three (3) times daily as needed. No current facility-administered medications for this visit.      No Known Allergies  Review of Systems:  - Cardiovascular: no CP  - Neurological: no tremors  - Integumentary: skin is normal    Physical Examination:  Blood pressure 123/75, pulse (!) 104, height 5' (1.524 m), weight 213 lb 6.4 oz (96.8 kg). General: pleasant, no distress, good eye contact   Neck: no thyromegaly or thyroid bruits  Cardiovascular: regular, normal rate, nl s1 and s2, no m/r/g   Integumentary: skin is normal, no edema  Neurological: reflexes 2+ at biceps, no tremors  Psychiatric: normal mood and affect    Data Reviewed:   None  Assessment/Plan:   1) Hyperthyroidism > Pt is clinically euthyroid on the MMI 2.5mg daily. I will order TFTs and adjust her dose as needed. Pt voices understanding and agreement with the plan. RTC 6 months. Follow-up and Dispositions    Return in about 6 months (around 8/9/2023).        Copy sent to:  Dr. Rafael Oliver

## 2023-02-10 ENCOUNTER — OFFICE VISIT (OUTPATIENT)
Dept: INTERNAL MEDICINE CLINIC | Age: 43
End: 2023-02-10
Payer: MEDICAID

## 2023-02-10 VITALS
HEIGHT: 60 IN | TEMPERATURE: 98.2 F | DIASTOLIC BLOOD PRESSURE: 85 MMHG | HEART RATE: 76 BPM | RESPIRATION RATE: 16 BRPM | WEIGHT: 213.6 LBS | OXYGEN SATURATION: 100 % | SYSTOLIC BLOOD PRESSURE: 133 MMHG | BODY MASS INDEX: 41.94 KG/M2

## 2023-02-10 DIAGNOSIS — E05.90 HYPERTHYROIDISM: ICD-10-CM

## 2023-02-10 DIAGNOSIS — Z78.9 HEPATITIS B IMMUNE: ICD-10-CM

## 2023-02-10 DIAGNOSIS — Z11.3 ROUTINE SCREENING FOR STI (SEXUALLY TRANSMITTED INFECTION): ICD-10-CM

## 2023-02-10 DIAGNOSIS — R80.9 PROTEINURIA, UNSPECIFIED TYPE: ICD-10-CM

## 2023-02-10 DIAGNOSIS — Z23 ENCOUNTER FOR IMMUNIZATION: ICD-10-CM

## 2023-02-10 DIAGNOSIS — R60.9 DEPENDENT EDEMA: Primary | ICD-10-CM

## 2023-02-10 DIAGNOSIS — R73.09 ELEVATED HEMOGLOBIN A1C: ICD-10-CM

## 2023-02-10 DIAGNOSIS — J45.30 MILD PERSISTENT ASTHMA WITHOUT COMPLICATION: ICD-10-CM

## 2023-02-10 DIAGNOSIS — R93.1 ABNORMAL ECHOCARDIOGRAM: ICD-10-CM

## 2023-02-10 DIAGNOSIS — Z00.00 WELL WOMAN EXAM (NO GYNECOLOGICAL EXAM): ICD-10-CM

## 2023-02-10 LAB
ALBUMIN SERPL-MCNC: 4.4 G/DL (ref 3.8–4.8)
ALBUMIN/GLOB SERPL: 1.6 {RATIO} (ref 1.2–2.2)
ALP SERPL-CCNC: 69 IU/L (ref 44–121)
ALT SERPL-CCNC: 10 IU/L (ref 0–32)
AST SERPL-CCNC: 12 IU/L (ref 0–40)
BILIRUB SERPL-MCNC: 0.3 MG/DL (ref 0–1.2)
BUN SERPL-MCNC: 13 MG/DL (ref 6–24)
BUN/CREAT SERPL: 15 (ref 9–23)
CALCIUM SERPL-MCNC: 9.6 MG/DL (ref 8.7–10.2)
CHLORIDE SERPL-SCNC: 102 MMOL/L (ref 96–106)
CO2 SERPL-SCNC: 20 MMOL/L (ref 20–29)
CREAT SERPL-MCNC: 0.87 MG/DL (ref 0.57–1)
EGFRCR SERPLBLD CKD-EPI 2021: 85 ML/MIN/1.73
ERYTHROCYTE [DISTWIDTH] IN BLOOD BY AUTOMATED COUNT: 13.1 % (ref 11.7–15.4)
GLOBULIN SER CALC-MCNC: 2.7 G/DL (ref 1.5–4.5)
GLUCOSE SERPL-MCNC: 115 MG/DL (ref 70–99)
HCT VFR BLD AUTO: 37 % (ref 34–46.6)
HGB BLD-MCNC: 12.5 G/DL (ref 11.1–15.9)
MCH RBC QN AUTO: 29.6 PG (ref 26.6–33)
MCHC RBC AUTO-ENTMCNC: 33.8 G/DL (ref 31.5–35.7)
MCV RBC AUTO: 88 FL (ref 79–97)
PLATELET # BLD AUTO: 382 X10E3/UL (ref 150–450)
POTASSIUM SERPL-SCNC: 4.1 MMOL/L (ref 3.5–5.2)
PROT SERPL-MCNC: 7.1 G/DL (ref 6–8.5)
RBC # BLD AUTO: 4.22 X10E6/UL (ref 3.77–5.28)
SODIUM SERPL-SCNC: 142 MMOL/L (ref 134–144)
T3 SERPL-MCNC: 79 NG/DL (ref 71–180)
T4 FREE SERPL-MCNC: 1.3 NG/DL (ref 0.82–1.77)
TSH RECEP AB SER-ACNC: 1.77 IU/L (ref 0–1.75)
TSH SERPL DL<=0.005 MIU/L-ACNC: 1.66 UIU/ML (ref 0.45–4.5)
TSI SER-ACNC: 0.46 IU/L (ref 0–0.55)
WBC # BLD AUTO: 10.6 X10E3/UL (ref 3.4–10.8)

## 2023-02-10 NOTE — PROGRESS NOTES
RM 16    Chief Complaint   Patient presents with    Annual Wellness Visit     Wants to discuss the heart analysis        Visit Vitals  /85 (BP 1 Location: Right upper arm, BP Patient Position: Sitting, BP Cuff Size: Adult long)   Pulse 76   Temp 98.2 °F (36.8 °C) (Oral)   Resp 16   Ht 5' (1.524 m)   Wt 213 lb 9.6 oz (96.9 kg)   SpO2 100%   BMI 41.72 kg/m²       3 most recent PHQ Screens 2/10/2023   Little interest or pleasure in doing things Not at all   Feeling down, depressed, irritable, or hopeless Not at all   Total Score PHQ 2 0       1. \"Have you been to the ER, urgent care clinic since your last visit? Hospitalized since your last visit? \" No    2. \"Have you seen or consulted any other health care providers outside of the [address] [place] [address] since your last visit? \" No     3. For patients aged 39-70: Has the patient had a colonoscopy / FIT/ Cologuard? No      If the patient is female:    4. For patients aged 41-77: Has the patient had a mammogram within the past 2 years? Yes - no Care Gap present      5. For patients aged 21-65: Has the patient had a pap smear? Yes - no Care Gap present     Health Maintenance Due   Topic Date Due    COVID-19 Vaccine (1) Never done    Pneumococcal 0-64 years (1 - PCV) Never done    DTaP/Tdap/Td series (1 - Tdap) Never done    Cervical cancer screen  Never done    Flu Vaccine (1) Never done       Learning Assessment 7/8/2019   PRIMARY LEARNER Patient   HIGHEST LEVEL OF EDUCATION - PRIMARY LEARNER  -   BARRIERS PRIMARY LEARNER NONE   PRIMARY LANGUAGE ENGLISH   LEARNER PREFERENCE PRIMARY READING     DEMONSTRATION   ANSWERED BY patient    RELATIONSHIP SELF         AVS  education, follow up, and recommendations provided and addressed with patient.   services used to advise patient No  VFC ELIGIBLE: YES  NO    Chief Complaint   Patient presents with    Annual Wellness Visit     Wants to discuss the heart analysis       Visit Vitals  /85 (BP 1 Location: Right upper arm, BP Patient Position: Sitting, BP Cuff Size: Adult long)   Pulse 76   Temp 98.2 °F (36.8 °C) (Oral)   Resp 16   Ht 5' (1.524 m)   Wt 213 lb 9.6 oz (96.9 kg)   SpO2 100%   BMI 41.72 kg/m²       After obtaining consent, and per orders of Dr. Kayla Rendon, injection of Prevnar 20 given by Gavino Boggs LPN. Patient instructed to remain in clinic for 20 minutes afterwards, and to report any adverse reaction to me immediately.

## 2023-02-10 NOTE — PROGRESS NOTES
Johnson Waller (: 1980) is a 43 y.o. female, established patient, here for evaluation of the following chief complaint(s):  Chief Complaint   Patient presents with    Annual Wellness Visit     Wants to discuss the heart analysis        Assessment and Plan:       ICD-10-CM ICD-9-CM    1. Dependent edema  R60.9 782.3       2. Elevated hemoglobin A1c  R73.09 790.29       3. Hyperthyroidism  E05.90 242.90       4. Proteinuria, unspecified type  R80.9 791.0 URINALYSIS W/MICROSCOPIC      PROTEIN/CREATININE RATIO, URINE      5. Well woman exam (no gynecological exam)  Z00.00 V70.0 LIPID PANEL      HEMOGLOBIN A1C WITH EAG      URINALYSIS W/MICROSCOPIC      6. Routine screening for STI (sexually transmitted infection)  Z11.3 V74.5 HIV 1/2 AG/AB, 4TH GENERATION,W RFLX CONFIRM      T PALLIDUM SCREEN W/REFLEX      HEPATITIS C AB      CT/NG/T.VAGINALIS AMPLIFICATION      7. Hepatitis B immune  Z78.9 V49.89       8. Mild persistent asthma without complication  J87.17 906.91 PNEUMOCOCCAL, PCV20, PREVNAR 20, (AGE 18 YRS+), IM, PF      9. Encounter for immunization  Z23 V03.89 PNEUMOCOCCAL, PCV20, PREVNAR 20, (AGE 18 YRS+), IM, PF      10. Abnormal echocardiogram  R93.1 793.2 REFERRAL TO CARDIOLOGY        Follow-up and Dispositions    Return in about 3 months (around 5/10/2023) for medication follow-up, referral follow-up, non-fasting labs. lab results and schedule of future lab studies reviewed with patient  reviewed diet, exercise and weight control  reviewed medications and side effects in detail  radiology results and schedule of future radiology studies reviewed with patient    Plan and evaluation (above) reviewed with pt at visit  Patient voiced understanding of plan and provided with time to ask/review questions. After Visit Summary (AVS) provided to pt after visit with additional instructions as needed/reviewed.       Future Appointments   Date Time Provider Karen Moody   8/10/2023 11:50 AM Claire Enrique MD [facility] AM[facility]     --Updated future visits after patient check-out. History of Present Illness:     Notes (nursing/rooming note copied below in italics):  As above'    Here for follow-up. Reviewed echo findings and questions. 09/16/22    ECHO ADULT COMPLETE 09/16/2022 9/16/2022    Interpretation Summary    Left Ventricle: Mildly reduced left ventricular systolic function with a visually estimated EF of 45 - 50%. Left ventricle size is normal. Normal wall thickness. Normal wall motion. Right Ventricle: Right ventricle size is normal. Normal systolic function. Tricuspid Valve: Mild regurgitation. Mildly elevated RVSP. The estimated RVSP is 34 mmHg. Signed by: Seun Reagan MD on 9/16/2022  5:38 PM        Seen in interim by thierry and Dr. Mark Frey. We had done screening labs in July 2022 as part of a screening code then. Physical/wellness visit not done, just screenigns    Lab Results   Component Value Date/Time    Cholesterol, total 162 07/27/2022 12:00 AM    HDL Cholesterol 56 07/27/2022 12:00 AM    LDL, calculated 95 07/27/2022 12:00 AM    VLDL, calculated 11 07/27/2022 12:00 AM    Triglyceride 55 07/27/2022 12:00 AM     Lab Results   Component Value Date/Time    Hemoglobin A1c 6.0 (H) 07/27/2022 12:00 AM       Health Maintenance Due   Topic Date Due    COVID-19 Vaccine (1) Never done    Pneumococcal 0-64 years (1 - PCV) Never done    DTaP/Tdap/Td series (1 - Tdap) Never done    Cervical cancer screen  Never done    Flu Vaccine (1) Never done     --She has had pap smear--since Sept 2022 visit here. Did not have to do STI testing there--done today. She had pap then--release completed. Mammogram done and release completed also. Plan PCV-20 today. Removed influenza. [address] (VIIS) form retrieved/reviewed. Only had Td from 2017--updated and postponed Tdap in .     Component      Latest Ref Rng & Units 7/27/2022 7/27/2022 7/27/2022          12:00 AM 12:00 AM 12:00 AM   Hep B Core Ab, total      Negative   Negative   HEP B SURFACE AB, QUAL        Reactive    Hep B surface Ag screen      Negative Negative             She has a FH of heart disease. Dad has a PM.  Mom has MI in past.  Didn't grow up with her. GM had heart problems too--but not clear. She had COVID (2nd episode June 2022, then Dec 2020 was initial episode. Nursing screenings reviewed by provider at visit. {Choose one or more SmartLinks; press DELETE if none desired:[id number]}     Prior to Admission medications    Medication Sig Start Date End Date Taking? Authorizing Provider   spironolactone (ALDACTONE) 50 mg tablet TAKE 1 TABLET BY MOUTH DAILY FOR 2 WEEKS. THEN 2 TABS BY MOUTH DAILY 1/22/23  Yes Provider, Historical   tretinoin (RETIN-A) 0.05 % topical cream APPLY CREAM EVERY OTHER NIGHT X 1 MONTH AND THEN NIGHTLY AS TOLERATED 1/22/23  Yes Provider, Historical   montelukast (SINGULAIR) 10 mg tablet TAKE 1 TABLET BY MOUTH NIGHTLY 2/5/23  Yes Silvio Alaniz MD   albuterol (Ventolin HFA) 90 mcg/actuation inhaler INHALE 2 PUFFS BY MOUTH EVERY 4 HOURS AS NEEDED FOR WHEEZE 11/13/22  Yes Silvio Alaniz MD   methIMAzole (TAPAZOLE) 5 mg tablet Take 0.5 Tablets by mouth in the morning. 8/9/22  Yes Calvin Rey MD   diclofenac (VOLTAREN) 1 % gel APPLY 3-4 GRAMS TOPICALLY TO AFFECTED AREA TWICE A DAY AS DIRECTED 5/23/22  Yes Provider, Historical   ibuprofen (MOTRIN) 800 mg tablet Take 800 mg by mouth three (3) times daily as needed. 7/10/22  Yes Provider, Historical        ROS    Vitals:    02/10/23 0918   BP: 133/85   Pulse: 76   Resp: 16   Temp: 98.2 °F (36.8 °C)   TempSrc: Oral   SpO2: 100%   Weight: 213 lb 9.6 oz (96.9 kg)   Height: 5' (1.524 m)   PainSc:   0 - No pain   LMP: 01/25/2023      Body mass index is 41.72 kg/m². Physical Exam:     Physical Exam  Vitals and nursing note reviewed.    Constitutional: General: She is not in acute distress. Appearance: Normal appearance. She is well-developed. She is not diaphoretic. HENT:      Head: Normocephalic and atraumatic. Right Ear: Tympanic membrane, ear canal and external ear normal.      Left Ear: Tympanic membrane, ear canal and external ear normal.      Mouth/Throat:      Mouth: Mucous membranes are moist.   Eyes:      General: No scleral icterus. Right eye: No discharge. Left eye: No discharge. Conjunctiva/sclera: Conjunctivae normal.   Cardiovascular:      Rate and Rhythm: Normal rate and regular rhythm. Pulses: Normal pulses. Heart sounds: Normal heart sounds. No murmur heard. No friction rub. No gallop. Pulmonary:      Effort: Pulmonary effort is normal. No respiratory distress. Breath sounds: Normal breath sounds. No stridor. No wheezing, rhonchi or rales. Abdominal:      General: Bowel sounds are normal. There is no distension. Palpations: Abdomen is soft. Tenderness: There is no abdominal tenderness. There is no guarding. Musculoskeletal:         General: No swelling, tenderness, deformity or signs of injury. Cervical back: Neck supple. No rigidity. No muscular tenderness. Right lower leg: No edema. Left lower leg: No edema. Lymphadenopathy:      Cervical: No cervical adenopathy. Skin:     General: Skin is warm. Coloration: Skin is not jaundiced or pale. Findings: No bruising, erythema or rash. Neurological:      General: No focal deficit present. Mental Status: She is alert. Motor: No abnormal muscle tone. Coordination: Coordination normal.      Gait: Gait normal.   Psychiatric:         Mood and Affect: Mood normal.         Behavior: Behavior normal.         Thought Content: Thought content normal.         Judgment: Judgment normal.       An electronic signature was used to authenticate this note.   -- Ritta Apgar, MD edema.   Lymphadenopathy:      Cervical: No cervical adenopathy. Skin:     General: Skin is warm. Coloration: Skin is not jaundiced or pale. Findings: No bruising, erythema or rash. Neurological:      General: No focal deficit present. Mental Status: She is alert. Motor: No abnormal muscle tone. Coordination: Coordination normal.      Gait: Gait normal.   Psychiatric:         Mood and Affect: Mood normal.         Behavior: Behavior normal.         Thought Content: Thought content normal.         Judgment: Judgment normal.       An electronic signature was used to authenticate this note.   -- Gaviota Canela MD

## 2023-02-14 LAB
APPEARANCE UR: CLEAR
BACTERIA #/AREA URNS HPF: NORMAL /[HPF]
BILIRUB UR QL STRIP: NEGATIVE
C TRACH RRNA SPEC QL NAA+PROBE: NEGATIVE
CASTS URNS QL MICRO: NORMAL /LPF
CHOLEST SERPL-MCNC: 154 MG/DL (ref 100–199)
COLOR UR: YELLOW
CREAT UR-MCNC: 116.2 MG/DL
EPI CELLS #/AREA URNS HPF: NORMAL /HPF (ref 0–10)
EST. AVERAGE GLUCOSE BLD GHB EST-MCNC: 126 MG/DL
GLUCOSE UR QL STRIP: NEGATIVE
HBA1C MFR BLD: 6 % (ref 4.8–5.6)
HCV IGG SERPL QL IA: <0.1 S/CO RATIO (ref 0–0.9)
HDLC SERPL-MCNC: 56 MG/DL
HGB UR QL STRIP: NEGATIVE
HIV 1+2 AB+HIV1 P24 AG SERPL QL IA: NON REACTIVE
KETONES UR QL STRIP: NEGATIVE
LDLC SERPL CALC-MCNC: 90 MG/DL (ref 0–99)
LEUKOCYTE ESTERASE UR QL STRIP: NEGATIVE
MICRO URNS: NORMAL
MICRO URNS: NORMAL
N GONORRHOEA RRNA SPEC QL NAA+PROBE: NEGATIVE
NITRITE UR QL STRIP: NEGATIVE
PH UR STRIP: 6.5 [PH] (ref 5–7.5)
PROT UR QL STRIP: NEGATIVE
PROT UR-MCNC: 7 MG/DL
PROT/CREAT UR: 60 MG/G CREAT (ref 0–200)
RBC #/AREA URNS HPF: NORMAL /HPF (ref 0–2)
SP GR UR STRIP: 1.02 (ref 1–1.03)
T VAGINALIS RRNA SPEC QL NAA+PROBE: NEGATIVE
TREPONEMA PALLIDUM IGG+IGM AB [PRESENCE] IN SERUM OR PLASMA BY IMMUNOASSAY: NON REACTIVE
TRIGL SERPL-MCNC: 34 MG/DL (ref 0–149)
UROBILINOGEN UR STRIP-MCNC: 0.2 MG/DL (ref 0.2–1)
VLDLC SERPL CALC-MCNC: 8 MG/DL (ref 5–40)
WBC #/AREA URNS HPF: NORMAL /HPF (ref 0–5)

## 2023-02-28 DIAGNOSIS — J45.30 MILD PERSISTENT ASTHMA, UNSPECIFIED WHETHER COMPLICATED: ICD-10-CM

## 2023-03-12 RX ORDER — ALBUTEROL SULFATE 90 UG/1
AEROSOL, METERED RESPIRATORY (INHALATION)
Qty: 18 EACH | Refills: 1 | Status: SHIPPED | OUTPATIENT
Start: 2023-03-12

## 2023-03-13 NOTE — TELEPHONE ENCOUNTER
Refill request(s) approved--albuterol HFA. Last refill was 11-13-22 for #18g with 1 refill(s).     Requested Prescriptions     Signed Prescriptions Disp Refills    albuterol (PROVENTIL HFA, VENTOLIN HFA, PROAIR HFA) 90 mcg/actuation inhaler 18 Each 1     Sig: INHALE 2 PUFFS BY MOUTH EVERY 4 HOURS AS NEEDED FOR WHEEZING     Authorizing Provider: Ronel Tsang

## 2023-05-22 DIAGNOSIS — J45.30 MILD PERSISTENT ASTHMA, UNCOMPLICATED: ICD-10-CM

## 2023-05-29 RX ORDER — ALBUTEROL SULFATE 90 UG/1
AEROSOL, METERED RESPIRATORY (INHALATION)
Qty: 18 EACH | Refills: 1 | Status: SHIPPED | OUTPATIENT
Start: 2023-05-29

## 2023-05-29 NOTE — TELEPHONE ENCOUNTER
Refill request(s) approved--albuterol HFA. Last refill was 3-12-23 for #18g with 1 refill(s). Refill protocol details (computer-generated) reviewed, as available.     Reviewed prior refill(s) in [address] [facility] record, due to limitation of refill record details/information that were transferred to new [facility] EMR medication list.    Requested Prescriptions     Signed Prescriptions Disp Refills    albuterol sulfate HFA (PROVENTIL;VENTOLIN;PROAIR) 108 (90 Base) MCG/ACT inhaler 18 each 1     Sig: TAKE 2 PUFFS BY MOUTH EVERY 4 HOURS AS NEEDED FOR WHEEZE     Authorizing Provider: Kelley Waters

## 2023-06-01 ENCOUNTER — TELEPHONE (OUTPATIENT)
Age: 43
End: 2023-06-01

## 2023-06-01 RX ORDER — SEMAGLUTIDE 0.25 MG/.5ML
0.25 INJECTION, SOLUTION SUBCUTANEOUS
Qty: 2 ML | Refills: 0 | Status: SHIPPED | OUTPATIENT
Start: 2023-06-01

## 2023-06-01 NOTE — TELEPHONE ENCOUNTER
Patient called  yesterday afternoon stating that she would like a weight loss medication (injectable).

## 2023-06-01 NOTE — TELEPHONE ENCOUNTER
I spoke with pt and she notes that she has been struggling \"for years\" to lose weight. She has tried calorie reduction diets, she has tried exercise regimens. She has spoken with a specialist about diet changes and yet she has been unable to have a significant amount of weight loss. She requested trying [facility]. I will order the Wegovy to her pharmacy to see if her insurance will pay for it.

## 2023-06-28 ENCOUNTER — TELEPHONE (OUTPATIENT)
Age: 43
End: 2023-06-28

## 2023-06-28 DIAGNOSIS — E66.01 CLASS 3 SEVERE OBESITY WITH SERIOUS COMORBIDITY AND BODY MASS INDEX (BMI) OF 40.0 TO 44.9 IN ADULT, UNSPECIFIED OBESITY TYPE (HCC): Primary | ICD-10-CM

## 2023-06-28 RX ORDER — PHENTERMINE HYDROCHLORIDE 37.5 MG/1
37.5 CAPSULE ORAL EVERY MORNING
Qty: 30 CAPSULE | Refills: 0 | Status: SHIPPED | OUTPATIENT
Start: 2023-06-28 | End: 2023-07-28

## 2023-08-04 DIAGNOSIS — J45.30 MILD PERSISTENT ASTHMA, UNCOMPLICATED: ICD-10-CM

## 2023-08-04 NOTE — TELEPHONE ENCOUNTER
Last appointment: 02/10/2023 MD Debra Burgos   Next appointment: No show 05/10/2023 - Nothing rescheduled   Previous refill encounter(s):   02/05/2023 Singulair #30 with 5 refills.      For Pharmacy Admin Tracking Only    Program: Medication Refill  Intervention Detail: New Rx: 1, reason: Patient Preference  Time Spent (min): 5      Requested Prescriptions     Pending Prescriptions Disp Refills    montelukast (SINGULAIR) 10 MG tablet [Pharmacy Med Name: MONTELUKAST SOD 10 MG TABLET] 30 tablet 0     Sig: TAKE 1 TABLET BY MOUTH NIGHTLY

## 2023-08-09 RX ORDER — MONTELUKAST SODIUM 10 MG/1
TABLET ORAL
Qty: 30 TABLET | Refills: 5 | Status: SHIPPED | OUTPATIENT
Start: 2023-08-09

## 2023-08-10 ENCOUNTER — OFFICE VISIT (OUTPATIENT)
Age: 43
End: 2023-08-10
Payer: MEDICAID

## 2023-08-10 VITALS
SYSTOLIC BLOOD PRESSURE: 133 MMHG | WEIGHT: 214.6 LBS | HEART RATE: 92 BPM | DIASTOLIC BLOOD PRESSURE: 81 MMHG | HEIGHT: 60 IN | BODY MASS INDEX: 42.13 KG/M2

## 2023-08-10 DIAGNOSIS — E66.01 CLASS 3 SEVERE OBESITY WITH SERIOUS COMORBIDITY AND BODY MASS INDEX (BMI) OF 40.0 TO 44.9 IN ADULT, UNSPECIFIED OBESITY TYPE (HCC): ICD-10-CM

## 2023-08-10 DIAGNOSIS — E05.90 HYPERTHYROIDISM: Primary | ICD-10-CM

## 2023-08-10 DIAGNOSIS — E05.90 HYPERTHYROIDISM: ICD-10-CM

## 2023-08-10 PROCEDURE — 99214 OFFICE O/P EST MOD 30 MIN: CPT | Performed by: INTERNAL MEDICINE

## 2023-08-10 RX ORDER — CLASCOTERONE 1 G/100G
CREAM TOPICAL
COMMUNITY
Start: 2023-06-24

## 2023-08-10 RX ORDER — DAPSONE 75 MG/G
GEL TOPICAL
COMMUNITY
Start: 2023-07-02

## 2023-08-10 NOTE — PROGRESS NOTES
(SINGULAIR) 10 MG tablet TAKE 1 TABLET BY MOUTH NIGHTLY    albuterol sulfate HFA (PROVENTIL;VENTOLIN;PROAIR) 108 (90 Base) MCG/ACT inhaler TAKE 2 PUFFS BY MOUTH EVERY 4 HOURS AS NEEDED FOR WHEEZE    diclofenac sodium (VOLTAREN) 1 % GEL APPLY 3-4 GRAMS TOPICALLY TO AFFECTED AREA TWICE A DAY AS DIRECTED    ibuprofen (ADVIL;MOTRIN) 800 MG tablet Take by mouth 3 times daily as needed    methIMAzole (TAPAZOLE) 5 MG tablet Take by mouth daily    spironolactone (ALDACTONE) 50 MG tablet TAKE 1 TABLET BY MOUTH DAILY FOR 2 WEEKS. THEN 2 TABS BY MOUTH DAILY    tretinoin (RETIN-A) 0.05 % cream APPLY CREAM EVERY OTHER NIGHT X 1 MONTH AND THEN NIGHTLY AS TOLERATED    Semaglutide-Weight Management (WEGOVY) 0.25 MG/0.5ML SOAJ SC injection Inject 0.25 mg into the skin every 7 days (Patient not taking: Reported on 8/10/2023)     No current facility-administered medications for this visit. No Known Allergies  Review of Systems:  - Cardiovascular: no chest pain  - Neurological: no tremors  - Integumentary: skin is normal    Physical Examination:  Blood pressure 133/81, pulse 92, height 5' (1.524 m), weight 214 lb 9.6 oz (97.3 kg). General: pleasant, no distress, good eye contact   Neck: no thyromegaly or thyroid bruits  Cardiovascular: regular, normal rate, nl s1 and s2, no m/r/g   Integumentary: skin is normal, no edema  Neurological: reflexes 2+ at biceps, no tremors  Psychiatric: normal mood and affect    Data Reviewed:   - none new for review    Assessment/Plan:   1) Hyperthyroidism > Pt is clinically euthyroid on the MMI 2.5mg daily. I will order TFTs and adjust her dose as needed. 2) Obesity > PT to start the Phentermine 37.5mg daily. Pt instructed about the possible side effects. Pt to call her local pharmacies and let me know when the Isidra Hiller is in stock, so I can order this for her again. Pt voices understanding and agreement with the plan. RTC 4 months.       Copy sent to:  Dr. Steve Carranza

## 2023-08-11 LAB
HBA1C MFR BLD: 6.2 % (ref 4.8–5.6)
T3 SERPL-MCNC: 97 NG/DL (ref 71–180)
T4 FREE SERPL-MCNC: 1.42 NG/DL (ref 0.82–1.77)
TSH SERPL DL<=0.005 MIU/L-ACNC: 1.2 UIU/ML (ref 0.45–4.5)

## 2023-08-17 DIAGNOSIS — E05.90 THYROTOXICOSIS, UNSPECIFIED WITHOUT THYROTOXIC CRISIS OR STORM: ICD-10-CM

## 2023-08-17 DIAGNOSIS — J45.30 MILD PERSISTENT ASTHMA, UNCOMPLICATED: ICD-10-CM

## 2023-08-17 NOTE — TELEPHONE ENCOUNTER
Last appointment: 02/10/2023 MD Chucky Mancia   Next appointment: No show 05/10/2023 - Nothing rescheduled   Previous refill encounter(s):   05/29/2023 Albuterol HFA INH #18 grams with 1 refill.      For Pharmacy Admin Tracking Only    Program: Medication Refill  Intervention Detail: New Rx: 1, reason: Patient Preference  Time Spent (min): 5      Requested Prescriptions     Pending Prescriptions Disp Refills    albuterol sulfate HFA (PROVENTIL;VENTOLIN;PROAIR) 108 (90 Base) MCG/ACT inhaler [Pharmacy Med Name: ALBUTEROL HFA (VENTOLIN) INH] 18 each 0     Sig: INHALE 2 PUFFS BY MOUTH EVERY 4 HOURS AS NEEDED FOR WHEEZE

## 2023-08-18 RX ORDER — METHIMAZOLE 5 MG/1
TABLET ORAL
Qty: 30 TABLET | Refills: 5 | Status: SHIPPED | OUTPATIENT
Start: 2023-08-18

## 2023-08-27 RX ORDER — ALBUTEROL SULFATE 90 UG/1
AEROSOL, METERED RESPIRATORY (INHALATION)
Qty: 18 EACH | Refills: 0 | Status: SHIPPED | OUTPATIENT
Start: 2023-08-27

## 2023-08-31 ENCOUNTER — TELEPHONE (OUTPATIENT)
Age: 43
End: 2023-08-31

## 2023-10-02 DIAGNOSIS — J45.30 MILD PERSISTENT ASTHMA, UNCOMPLICATED: ICD-10-CM

## 2023-10-02 RX ORDER — SEMAGLUTIDE 0.25 MG/.5ML
0.25 INJECTION, SOLUTION SUBCUTANEOUS
Qty: 2 ML | Refills: 3 | Status: SHIPPED | OUTPATIENT
Start: 2023-10-02

## 2023-10-02 NOTE — TELEPHONE ENCOUNTER
Patient left VM stating that [facility] has Tamera crest.

## 2023-10-02 NOTE — TELEPHONE ENCOUNTER
Please contact patient for scheduling. Thanks  Writer sent pt a message via [employer].      Last appointment: 02/10/2023 MD Ambrosio Stanley   Next appointment: No show 05/10/2023 - Nothing rescheduled   Previous refill encounter(s):   08/27/2023 Albuterol HFA INH #18     For Pharmacy Admin Tracking Only    Program: Medication Refill  Intervention Detail: New Rx: 1, reason: Patient Preference  Time Spent (min): 5      Requested Prescriptions     Pending Prescriptions Disp Refills    albuterol sulfate HFA (PROVENTIL;VENTOLIN;PROAIR) 108 (90 Base) MCG/ACT inhaler [Pharmacy Med Name: ALBUTEROL HFA (VENTOLIN) INH] 18 each 0     Sig: INHALE 2 PUFFS BY MOUTH EVERY 4 HOURS AS NEEDED FOR WHEEZING

## 2023-10-08 RX ORDER — ALBUTEROL SULFATE 90 UG/1
2 AEROSOL, METERED RESPIRATORY (INHALATION) EVERY 4 HOURS PRN
Qty: 8 G | Refills: 0 | Status: SHIPPED | OUTPATIENT
Start: 2023-10-08

## 2023-10-10 ENCOUNTER — TELEPHONE (OUTPATIENT)
Age: 43
End: 2023-10-10

## 2023-10-10 NOTE — TELEPHONE ENCOUNTER
Patient would like a refill on her Albuterol and Allergy meds.     Patient is scheduled for her Physical on 11/7/2023     [phone]

## 2023-11-02 ENCOUNTER — OFFICE VISIT (OUTPATIENT)
Age: 43
End: 2023-11-02

## 2023-11-02 VITALS
OXYGEN SATURATION: 96 % | BODY MASS INDEX: 40.72 KG/M2 | DIASTOLIC BLOOD PRESSURE: 78 MMHG | HEART RATE: 90 BPM | SYSTOLIC BLOOD PRESSURE: 138 MMHG | TEMPERATURE: 98.4 F | WEIGHT: 208.5 LBS

## 2023-11-02 DIAGNOSIS — R05.9 COUGH, UNSPECIFIED TYPE: ICD-10-CM

## 2023-11-02 DIAGNOSIS — R19.5 LOOSE STOOLS: ICD-10-CM

## 2023-11-02 DIAGNOSIS — J45.31 MILD PERSISTENT ASTHMATIC BRONCHITIS WITH ACUTE EXACERBATION: Primary | ICD-10-CM

## 2023-11-02 LAB
INFLUENZA A ANTIGEN, POC: NEGATIVE
INFLUENZA B ANTIGEN, POC: NEGATIVE
Lab: NORMAL
QC PASS/FAIL: NORMAL
SARS-COV-2 RDRP RESP QL NAA+PROBE: NEGATIVE
VALID INTERNAL CONTROL, POC: NORMAL

## 2023-11-02 RX ORDER — PREDNISONE 20 MG/1
TABLET ORAL
Qty: 8 TABLET | Refills: 0 | Status: SHIPPED | OUTPATIENT
Start: 2023-11-02

## 2023-11-02 NOTE — PROGRESS NOTES
Subjective: (As above and below)     The patient/guardian gave verbal consent to treat. Chief Complaint   Patient presents with    Cough     X1week     Diarrhea     Dani Parham is a 37 y.o. female who presents for evaluation of: cough, diarrhea . Symptom onset 1 week . Preceding illness: none. No other identified aggravating or alleviating factors. Symptoms are constant and overall improving. Denies: SOB, vomiting/diarrhea, rashes, fevers . Review of Systems    Review of Systems - negative except as listed above    Reviewed PmHx, RxHx, FmHx, SocHx, AllgHx and updated in chart. Family History   Problem Relation Age of Onset    Thyroid Disease Paternal Aunt     Cancer Neg Hx     Diabetes Paternal Grandmother     Stroke Paternal Grandmother     No Known Problems Brother     Thyroid Disease Sister     Diabetes Sister     Heart Attack Mother     Diabetes Father        Past Medical History:   Diagnosis Date    Anxiety     History of panic attacks. Asthma     Chronic kidney disease     Endocrine disease     hyperthyroidism    Hepatitis B immune 07/27/2022    Hep B sAb (+) with negative sAg and total core Ab. Patellar tracking disorder     Bilat. Ortho eval with Dr. Ubaldo Bustillo April 2018. Primary osteoarthritis right knee. Social History     Socioeconomic History    Marital status: Single     Spouse name: None    Number of children: None    Years of education: None    Highest education level: None   Tobacco Use    Smoking status: Never    Smokeless tobacco: Never   Substance and Sexual Activity    Alcohol use:  Yes     Alcohol/week: 0.0 standard drinks of alcohol    Drug use: No     Social Determinants of Health     Physical Activity: Insufficiently Active (7/25/2022)    Exercise Vital Sign     Days of Exercise per Week: 2 days     Minutes of Exercise per Session: 30 min   Intimate Partner Violence: Not At Risk (7/25/2022)    Humiliation, Afraid, Rape, and Kick

## 2023-11-27 ENCOUNTER — TELEPHONE (OUTPATIENT)
Age: 43
End: 2023-11-27

## 2023-11-27 NOTE — TELEPHONE ENCOUNTER
[employer] faxed a letter 11/07/2023 stating that they are in the process of reviewing the appeal for Jose C Frame. The appeal should be resolved within 30 days.

## 2023-12-06 ENCOUNTER — OFFICE VISIT (OUTPATIENT)
Age: 43
End: 2023-12-06
Payer: MEDICAID

## 2023-12-06 VITALS
TEMPERATURE: 98.3 F | RESPIRATION RATE: 16 BRPM | HEART RATE: 94 BPM | DIASTOLIC BLOOD PRESSURE: 66 MMHG | BODY MASS INDEX: 41.66 KG/M2 | HEIGHT: 60 IN | SYSTOLIC BLOOD PRESSURE: 135 MMHG | OXYGEN SATURATION: 98 % | WEIGHT: 212.2 LBS

## 2023-12-06 DIAGNOSIS — R93.1 ABNORMAL FINDINGS ON DIAGNOSTIC IMAGING OF HEART AND CORONARY CIRCULATION: ICD-10-CM

## 2023-12-06 DIAGNOSIS — Z00.00 WELL WOMAN EXAM (NO GYNECOLOGICAL EXAM): Primary | ICD-10-CM

## 2023-12-06 DIAGNOSIS — Z01.84 IMMUNITY STATUS TESTING: ICD-10-CM

## 2023-12-06 PROCEDURE — 99396 PREV VISIT EST AGE 40-64: CPT | Performed by: INTERNAL MEDICINE

## 2023-12-06 SDOH — ECONOMIC STABILITY: FOOD INSECURITY: WITHIN THE PAST 12 MONTHS, YOU WORRIED THAT YOUR FOOD WOULD RUN OUT BEFORE YOU GOT MONEY TO BUY MORE.: NEVER TRUE

## 2023-12-06 SDOH — ECONOMIC STABILITY: HOUSING INSECURITY
IN THE LAST 12 MONTHS, WAS THERE A TIME WHEN YOU DID NOT HAVE A STEADY PLACE TO SLEEP OR SLEPT IN A SHELTER (INCLUDING NOW)?: NO

## 2023-12-06 SDOH — ECONOMIC STABILITY: INCOME INSECURITY: HOW HARD IS IT FOR YOU TO PAY FOR THE VERY BASICS LIKE FOOD, HOUSING, MEDICAL CARE, AND HEATING?: NOT HARD AT ALL

## 2023-12-06 SDOH — ECONOMIC STABILITY: FOOD INSECURITY: WITHIN THE PAST 12 MONTHS, THE FOOD YOU BOUGHT JUST DIDN'T LAST AND YOU DIDN'T HAVE MONEY TO GET MORE.: NEVER TRUE

## 2023-12-06 ASSESSMENT — PATIENT HEALTH QUESTIONNAIRE - PHQ9
SUM OF ALL RESPONSES TO PHQ QUESTIONS 1-9: 0
SUM OF ALL RESPONSES TO PHQ9 QUESTIONS 1 & 2: 0
2. FEELING DOWN, DEPRESSED OR HOPELESS: 0
SUM OF ALL RESPONSES TO PHQ QUESTIONS 1-9: 0
SUM OF ALL RESPONSES TO PHQ QUESTIONS 1-9: 0
1. LITTLE INTEREST OR PLEASURE IN DOING THINGS: 0
SUM OF ALL RESPONSES TO PHQ QUESTIONS 1-9: 0

## 2023-12-06 NOTE — PROGRESS NOTES
Rm: 17  Still having issues for wheezing and need referral for cardio and asthma     Chief Complaint   Patient presents with    Annual Exam        1. Have you been to the ER, urgent care clinic since your last visit? Hospitalized since your last visit? yes, [facility]     2. Have you seen or consulted any other health care providers outside of the [address] [place] [address] since your last visit? Include any pap smears or colon screening.  no        Social Determinants of Health     Tobacco Use: Low Risk  (12/6/2023)    Patient History     Smoking Tobacco Use: Never     Smokeless Tobacco Use: Never     Passive Exposure: Not on file   Alcohol Use: Not on file   Financial Resource Strain: Not on file   Food Insecurity: Not on file   Transportation Needs: Not on file   Physical Activity: Insufficiently Active (7/25/2022)    Exercise Vital Sign     Days of Exercise per Week: 2 days     Minutes of Exercise per Session: 30 min   Stress: Not on file   Social Connections: Not on file   Intimate Partner Violence: Not At Risk (7/25/2022)    Humiliation, Afraid, Rape, and Kick questionnaire     Fear of Current or Ex-Partner: No     Emotionally Abused: No     Physically Abused: No     Sexually Abused: No   Depression: Not at risk (12/6/2023)    PHQ-2     PHQ-2 Score: 0   Housing Stability: Not on file   Interpersonal Safety: Not on file   Utilities: Not on file
screenings there. Has mammogram to do. Release for pap and mammogram completed at visit. --Has not done prior COVID. Not interested in influenza vaccine. Nursing screenings reviewed by provider at visit. Past Medical History:   Diagnosis Date    Anxiety     History of panic attacks. Asthma     Chronic kidney disease     Endocrine disease     hyperthyroidism    Hepatitis B immune 2022    Hep B sAb (+) with negative sAg and total core Ab. Hypothyroidism     Patellar tracking disorder     Bilat. Ortho eval with Dr. Byrd Angelucci 2018. Primary osteoarthritis right knee. Past Surgical History:   Procedure Laterality Date     SECTION          Prior to Admission medications    Medication Sig Start Date End Date Taking? Authorizing Provider   predniSONE (DELTASONE) 20 MG tablet Take 2 tabs by mouth once daily for 4 days.  23  Yes GARCÍA Ortiz - NP   albuterol sulfate HFA (PROVENTIL;VENTOLIN;PROAIR) 108 (90 Base) MCG/ACT inhaler INHALE 2 PUFFS BY MOUTH EVERY 4 HOURS AS NEEDED FOR WHEEZING 10/8/23  Yes Jun Alarcon MD   Semaglutide-Weight Management [facility]) 0.25 MG/0.5ML SOAJ SC injection Inject 0.25 mg into the skin every 7 days 10/2/23  Yes Ronny Richards MD   methIMAzole (TAPAZOLE) 5 MG tablet TAKE 0.5 TABLETS BY MOUTH IN THE MORNING 23  Yes Ronny Richards MD   WINLEVI 1 % CREA APPLY CREAM TWICE DAILY 23  Yes Provider, MD Disha   dapsone (ACZONE) 7.5 % GEL topical gel APPLY GEL TO FACE IN MORNINGS 23  Yes Provider, MD Disha   montelukast (SINGULAIR) 10 MG tablet TAKE 1 TABLET BY MOUTH NIGHTLY 23  Yes Jun Alarcon MD   diclofenac sodium (VOLTAREN) 1 % GEL APPLY 3-4 GRAMS TOPICALLY TO AFFECTED AREA TWICE A DAY AS DIRECTED 22  Yes Automatic Reconciliation, Ar   ibuprofen (ADVIL;MOTRIN) 800 MG tablet Take by mouth 3 times daily as needed 7/10/22  Yes Automatic Reconciliation, Ar

## 2023-12-11 ENCOUNTER — OFFICE VISIT (OUTPATIENT)
Age: 43
End: 2023-12-11
Payer: MEDICAID

## 2023-12-11 VITALS
HEIGHT: 60 IN | SYSTOLIC BLOOD PRESSURE: 133 MMHG | DIASTOLIC BLOOD PRESSURE: 75 MMHG | HEART RATE: 98 BPM | BODY MASS INDEX: 42.37 KG/M2 | WEIGHT: 215.8 LBS

## 2023-12-11 DIAGNOSIS — E66.01 CLASS 3 SEVERE OBESITY WITH SERIOUS COMORBIDITY AND BODY MASS INDEX (BMI) OF 40.0 TO 44.9 IN ADULT, UNSPECIFIED OBESITY TYPE (HCC): ICD-10-CM

## 2023-12-11 DIAGNOSIS — Z00.00 WELL WOMAN EXAM (NO GYNECOLOGICAL EXAM): ICD-10-CM

## 2023-12-11 DIAGNOSIS — Z01.84 IMMUNITY STATUS TESTING: ICD-10-CM

## 2023-12-11 DIAGNOSIS — E05.90 HYPERTHYROIDISM: ICD-10-CM

## 2023-12-11 DIAGNOSIS — E05.90 HYPERTHYROIDISM: Primary | ICD-10-CM

## 2023-12-11 PROCEDURE — 99214 OFFICE O/P EST MOD 30 MIN: CPT | Performed by: INTERNAL MEDICINE

## 2023-12-11 NOTE — PROGRESS NOTES
Chief Complaint   Patient presents with    Thyroid Problem    Weight Management     Pcp and pharmacy verified     History of Present Illness: Dann Homans is a 37 y.o. female here for follow up of hyperthyroidism and obesity   hyperthyroidism. At our last visit pt was clinically and biochemically euthyroid on the MMI 2.5mg daily. Pt was instructed to continue the MMI 2.5mg daily. She ask to be started on a medication for obesity. She has tried calorie reduction diets, she has tried exercise regimens. She has spoken with a specialist about diet changes and yet she has been unable to have a significant amount of weight loss. She requested trying [facility]. The [facility] was on back order so for a time so we agreed to try her on Phentermine till the [facility] is in stock. Pt is no longer taking the Phentermine. \"I did not like how it makes me feel. It caused palpitations and jittery\". Her insurance has refused the [facility] and is still deciding on the appeal.    Pt denies any recent illnesses, injuries or hospitalizations. Pt is still taking the MMI 2.5mg every day. Pt notes she is still taking the spironolactone 50mg once daily. \"I went to the dermatologist for my acne and they put me on that. \"  She does feel that it has helped with the acne. Her weight today was 215 pounds, which was unchanged from August 2023. Her son who has Autism is now in the 12th grade and is still working at [employer]. She has not needed any Atenolol, she has not had any palpitations. She denies issues of palpitations, SOB, heat or cold intolerance. She denies issues of diarrhea or constipation. She denies issues of dysphagia or dysphonia. She has been needing the saline eye drops. She denies erythema or eye pain. She has follow up with the eye doctor in the near future. She saw her eye doctor in March 2023. \"She said everything was looking fine\".   She has follow in next week with her eye

## 2023-12-12 LAB
ALBUMIN SERPL-MCNC: 4.1 G/DL (ref 3.9–4.9)
ALBUMIN/GLOB SERPL: 1.5 {RATIO} (ref 1.2–2.2)
ALP SERPL-CCNC: 64 IU/L (ref 44–121)
ALT SERPL-CCNC: 13 IU/L (ref 0–32)
AST SERPL-CCNC: 14 IU/L (ref 0–40)
BASOPHILS # BLD AUTO: 0 X10E3/UL (ref 0–0.2)
BASOPHILS NFR BLD AUTO: 0 %
BILIRUB SERPL-MCNC: <0.2 MG/DL (ref 0–1.2)
BUN SERPL-MCNC: 10 MG/DL (ref 6–24)
BUN/CREAT SERPL: 14 (ref 9–23)
CALCIUM SERPL-MCNC: 9.2 MG/DL (ref 8.7–10.2)
CHLORIDE SERPL-SCNC: 101 MMOL/L (ref 96–106)
CHOLEST SERPL-MCNC: 155 MG/DL (ref 100–199)
CO2 SERPL-SCNC: 23 MMOL/L (ref 20–29)
CREAT SERPL-MCNC: 0.72 MG/DL (ref 0.57–1)
EGFRCR SERPLBLD CKD-EPI 2021: 106 ML/MIN/1.73
EOSINOPHIL # BLD AUTO: 0.2 X10E3/UL (ref 0–0.4)
EOSINOPHIL NFR BLD AUTO: 2 %
ERYTHROCYTE [DISTWIDTH] IN BLOOD BY AUTOMATED COUNT: 13.1 % (ref 11.7–15.4)
GLOBULIN SER CALC-MCNC: 2.8 G/DL (ref 1.5–4.5)
GLUCOSE SERPL-MCNC: 88 MG/DL (ref 70–99)
HCT VFR BLD AUTO: 31.3 % (ref 34–46.6)
HDLC SERPL-MCNC: 51 MG/DL
HGB BLD-MCNC: 10.7 G/DL (ref 11.1–15.9)
IMM GRANULOCYTES # BLD AUTO: 0.1 X10E3/UL (ref 0–0.1)
IMM GRANULOCYTES NFR BLD AUTO: 1 %
IMP & REVIEW OF LAB RESULTS: NORMAL
LDLC SERPL CALC-MCNC: 93 MG/DL (ref 0–99)
LYMPHOCYTES # BLD AUTO: 3.6 X10E3/UL (ref 0.7–3.1)
LYMPHOCYTES NFR BLD AUTO: 34 %
MCH RBC QN AUTO: 29.3 PG (ref 26.6–33)
MCHC RBC AUTO-ENTMCNC: 34.2 G/DL (ref 31.5–35.7)
MCV RBC AUTO: 86 FL (ref 79–97)
MONOCYTES # BLD AUTO: 0.6 X10E3/UL (ref 0.1–0.9)
MONOCYTES NFR BLD AUTO: 5 %
NEUTROPHILS # BLD AUTO: 6.2 X10E3/UL (ref 1.4–7)
NEUTROPHILS NFR BLD AUTO: 58 %
PLATELET # BLD AUTO: 396 X10E3/UL (ref 150–450)
POTASSIUM SERPL-SCNC: 4.4 MMOL/L (ref 3.5–5.2)
PROT SERPL-MCNC: 6.9 G/DL (ref 6–8.5)
RBC # BLD AUTO: 3.65 X10E6/UL (ref 3.77–5.28)
SODIUM SERPL-SCNC: 136 MMOL/L (ref 134–144)
T3 SERPL-MCNC: 98 NG/DL (ref 71–180)
T4 FREE SERPL-MCNC: 1.13 NG/DL (ref 0.82–1.77)
TRIGL SERPL-MCNC: 55 MG/DL (ref 0–149)
TSH SERPL DL<=0.005 MIU/L-ACNC: 2.51 UIU/ML (ref 0.45–4.5)
VLDLC SERPL CALC-MCNC: 11 MG/DL (ref 5–40)
VZV IGG SER IA-ACNC: 430 INDEX
WBC # BLD AUTO: 10.6 X10E3/UL (ref 3.4–10.8)

## 2023-12-27 ENCOUNTER — OFFICE VISIT (OUTPATIENT)
Age: 43
End: 2023-12-27
Payer: MEDICAID

## 2023-12-27 VITALS
WEIGHT: 212.8 LBS | HEIGHT: 60 IN | SYSTOLIC BLOOD PRESSURE: 126 MMHG | DIASTOLIC BLOOD PRESSURE: 88 MMHG | BODY MASS INDEX: 41.78 KG/M2 | HEART RATE: 83 BPM | OXYGEN SATURATION: 100 %

## 2023-12-27 DIAGNOSIS — J45.20 MILD INTERMITTENT ASTHMA WITHOUT COMPLICATION: ICD-10-CM

## 2023-12-27 DIAGNOSIS — Z76.89 ENCOUNTER TO ESTABLISH CARE WITH NEW DOCTOR: ICD-10-CM

## 2023-12-27 DIAGNOSIS — R06.02 SOB (SHORTNESS OF BREATH): ICD-10-CM

## 2023-12-27 DIAGNOSIS — E74.39 GLUCOSE INTOLERANCE: ICD-10-CM

## 2023-12-27 DIAGNOSIS — E66.01 MORBID OBESITY (HCC): ICD-10-CM

## 2023-12-27 DIAGNOSIS — E05.90 HYPERTHYROIDISM: ICD-10-CM

## 2023-12-27 DIAGNOSIS — R93.1 ABNORMAL ECHOCARDIOGRAM: Primary | ICD-10-CM

## 2023-12-27 PROCEDURE — 99204 OFFICE O/P NEW MOD 45 MIN: CPT | Performed by: SPECIALIST

## 2023-12-27 PROCEDURE — 93005 ELECTROCARDIOGRAM TRACING: CPT | Performed by: SPECIALIST

## 2023-12-27 NOTE — PROGRESS NOTES
HISTORY OF PRESENT ILLNESS  Arline Cano is a 37 y.o. female   She is referred for evaluation of a prior abnormal echocardiogram as well as respiratory sounds that she is making when lying flat. She does have asthma but she does not think this is clearly wheezing. She uses an albuterol inhaler as needed. She has not had a recent chest x-ray but did have one last summer in [place] and it was apparently unremarkable. She does not smoke cigarettes. She drinks alcohol socially. Her mother may have had a heart attack. Her LDL cholesterol is quite low despite no medications. She had an echocardiogram done in September of last year that showed mildly reduced left ventricular function. It was a technically difficult study and Definity contrast was not used but I did review the study today and I agree that overall left ventricular function was not quite normal.  She takes methimazole for hyperthyroidism and also Aldactone for reasons other than hypertension. She works as a  but does not exercise. She is on Charl Anahi for weight loss. Shortness of Breath  Associated symptoms include wheezing. Palpitations   Associated symptoms include shortness of breath.         Specialty Problems    None     Current Outpatient Medications   Medication Instructions    albuterol sulfate HFA (PROVENTIL;VENTOLIN;PROAIR) 108 (90 Base) MCG/ACT inhaler 2 puffs, Inhalation, EVERY 4 HOURS PRN    dapsone (ACZONE) 7.5 % GEL topical gel APPLY GEL TO FACE IN MORNINGS    diclofenac sodium (VOLTAREN) 1 % GEL APPLY 3-4 GRAMS TOPICALLY TO AFFECTED AREA TWICE A DAY AS DIRECTED    ibuprofen (ADVIL;MOTRIN) 800 MG tablet Oral, 3 TIMES DAILY PRN    methIMAzole (TAPAZOLE) 5 MG tablet TAKE 0.5 TABLETS BY MOUTH IN THE MORNING    montelukast (SINGULAIR) 10 MG tablet TAKE 1 TABLET BY MOUTH NIGHTLY    spironolactone (ALDACTONE) 50 MG tablet Take 1 tablet by mouth daily    tretinoin (RETIN-A) 0.05 % cream APPLY CREAM EVERY OTHER NIGHT

## 2024-01-29 ENCOUNTER — ANCILLARY PROCEDURE (OUTPATIENT)
Age: 44
End: 2024-01-29
Payer: MEDICAID

## 2024-01-29 VITALS
DIASTOLIC BLOOD PRESSURE: 88 MMHG | SYSTOLIC BLOOD PRESSURE: 126 MMHG | HEIGHT: 60 IN | BODY MASS INDEX: 41.62 KG/M2 | WEIGHT: 212 LBS

## 2024-01-29 DIAGNOSIS — R06.02 SOB (SHORTNESS OF BREATH): ICD-10-CM

## 2024-01-29 PROCEDURE — 93306 TTE W/DOPPLER COMPLETE: CPT | Performed by: SPECIALIST

## 2024-01-30 ENCOUNTER — TELEPHONE (OUTPATIENT)
Age: 44
End: 2024-01-30

## 2024-01-30 LAB
ECHO AO ASC DIAM: 3 CM
ECHO AO ASCENDING AORTA INDEX: 1.57 CM/M2
ECHO AO ROOT DIAM: 3 CM
ECHO AO ROOT INDEX: 1.57 CM/M2
ECHO AV AREA PEAK VELOCITY: 2.2 CM2
ECHO AV AREA VTI: 2.4 CM2
ECHO AV AREA/BSA PEAK VELOCITY: 1.2 CM2/M2
ECHO AV AREA/BSA VTI: 1.3 CM2/M2
ECHO AV MEAN GRADIENT: 5 MMHG
ECHO AV MEAN VELOCITY: 1.1 M/S
ECHO AV PEAK GRADIENT: 9 MMHG
ECHO AV PEAK VELOCITY: 1.5 M/S
ECHO AV VELOCITY RATIO: 0.73
ECHO AV VTI: 29.9 CM
ECHO BSA: 2.02 M2
ECHO EST RA PRESSURE: 3 MMHG
ECHO LA DIAMETER INDEX: 1.94 CM/M2
ECHO LA DIAMETER: 3.7 CM
ECHO LA TO AORTIC ROOT RATIO: 1.23
ECHO LA VOL A-L A2C: 45 ML (ref 22–52)
ECHO LA VOL A-L A4C: 42 ML (ref 22–52)
ECHO LA VOL BP: 44 ML (ref 22–52)
ECHO LA VOL MOD A2C: 44 ML (ref 22–52)
ECHO LA VOL MOD A4C: 41 ML (ref 22–52)
ECHO LA VOL/BSA BIPLANE: 23 ML/M2 (ref 16–34)
ECHO LA VOLUME AREA LENGTH: 44 ML
ECHO LA VOLUME INDEX A-L A2C: 24 ML/M2 (ref 16–34)
ECHO LA VOLUME INDEX A-L A4C: 22 ML/M2 (ref 16–34)
ECHO LA VOLUME INDEX AREA LENGTH: 23 ML/M2 (ref 16–34)
ECHO LA VOLUME INDEX MOD A2C: 23 ML/M2 (ref 16–34)
ECHO LA VOLUME INDEX MOD A4C: 21 ML/M2 (ref 16–34)
ECHO LV E' LATERAL VELOCITY: 15 CM/S
ECHO LV E' SEPTAL VELOCITY: 9 CM/S
ECHO LV FRACTIONAL SHORTENING: 37 % (ref 28–44)
ECHO LV INTERNAL DIMENSION DIASTOLE INDEX: 2.25 CM/M2
ECHO LV INTERNAL DIMENSION DIASTOLIC: 4.3 CM (ref 3.9–5.3)
ECHO LV INTERNAL DIMENSION SYSTOLIC INDEX: 1.41 CM/M2
ECHO LV INTERNAL DIMENSION SYSTOLIC: 2.7 CM
ECHO LV IVSD: 1 CM (ref 0.6–0.9)
ECHO LV MASS 2D: 142.5 G (ref 67–162)
ECHO LV MASS INDEX 2D: 74.6 G/M2 (ref 43–95)
ECHO LV POSTERIOR WALL DIASTOLIC: 1 CM (ref 0.6–0.9)
ECHO LV RELATIVE WALL THICKNESS RATIO: 0.47
ECHO LVOT AREA: 3.1 CM2
ECHO LVOT AV VTI INDEX: 0.78
ECHO LVOT DIAM: 2 CM
ECHO LVOT MEAN GRADIENT: 3 MMHG
ECHO LVOT PEAK GRADIENT: 5 MMHG
ECHO LVOT PEAK VELOCITY: 1.1 M/S
ECHO LVOT STROKE VOLUME INDEX: 38.5 ML/M2
ECHO LVOT SV: 73.5 ML
ECHO LVOT VTI: 23.4 CM
ECHO MV A VELOCITY: 0.92 M/S
ECHO MV E DECELERATION TIME (DT): 149.2 MS
ECHO MV E VELOCITY: 0.72 M/S
ECHO MV E/A RATIO: 0.78
ECHO MV E/E' LATERAL: 4.8
ECHO MV E/E' RATIO (AVERAGED): 6.4
ECHO RIGHT VENTRICULAR SYSTOLIC PRESSURE (RVSP): 23 MMHG
ECHO RV BASAL DIMENSION: 3.6 CM
ECHO RV FREE WALL PEAK S': 11 CM/S
ECHO RV TAPSE: 2.2 CM (ref 1.7–?)
ECHO TV REGURGITANT MAX VELOCITY: 2.22 M/S
ECHO TV REGURGITANT PEAK GRADIENT: 20 MMHG

## 2024-01-30 PROCEDURE — 93306 TTE W/DOPPLER COMPLETE: CPT | Performed by: SPECIALIST

## 2024-01-30 NOTE — TELEPHONE ENCOUNTER
----- Message from Cas Melendez MD sent at 1/30/2024 12:27 PM EST -----  Heart function now normal on echo.  Suggest seeing pulmonary or ENT for SOB.

## 2024-01-31 ENCOUNTER — TELEPHONE (OUTPATIENT)
Age: 44
End: 2024-01-31

## 2024-01-31 NOTE — TELEPHONE ENCOUNTER
Called ptAdolfo LOZANO on . Also stated I sent [employer] message so she can view that or call back.

## 2024-02-01 NOTE — TELEPHONE ENCOUNTER
Called pt back. Verified patient's identity with two identifiers. Notified her of echo and results and that Dr. Melendez advised she see pulmonary or ENT. Pt did say she is still having wheezing, not sob. She is not sure if her insurance requires referral, which would have to come from her pcp, but I told her we could refer and send records if she would like. Gave her Dr. Trevino's office # to call if she decides to try pulmonary and told her I would ask Dr. Melendez if he knew of a good ENT provider. Pt said I can sent ENT provider info in [employer] message. Patient verbalized understanding and denied further questions or concerns.
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
## 2024-03-18 ENCOUNTER — OFFICE VISIT (OUTPATIENT)
Age: 44
End: 2024-03-18
Payer: MEDICAID

## 2024-03-18 VITALS
HEIGHT: 60 IN | DIASTOLIC BLOOD PRESSURE: 85 MMHG | RESPIRATION RATE: 16 BRPM | SYSTOLIC BLOOD PRESSURE: 126 MMHG | HEART RATE: 86 BPM | WEIGHT: 212.2 LBS | BODY MASS INDEX: 41.66 KG/M2 | OXYGEN SATURATION: 95 % | TEMPERATURE: 97.6 F

## 2024-03-18 DIAGNOSIS — J45.30 MILD PERSISTENT ASTHMA, UNCOMPLICATED: Primary | ICD-10-CM

## 2024-03-18 DIAGNOSIS — D64.9 ANEMIA, UNSPECIFIED TYPE: ICD-10-CM

## 2024-03-18 DIAGNOSIS — R19.7 DIARRHEA OF PRESUMED INFECTIOUS ORIGIN: ICD-10-CM

## 2024-03-18 DIAGNOSIS — J45.31 MILD PERSISTENT ASTHMATIC BRONCHITIS WITH ACUTE EXACERBATION: ICD-10-CM

## 2024-03-18 DIAGNOSIS — B37.2 INTERTRIGINOUS CANDIDIASIS: ICD-10-CM

## 2024-03-18 DIAGNOSIS — R06.00 DYSPNEA, UNSPECIFIED TYPE: ICD-10-CM

## 2024-03-18 PROCEDURE — 99214 OFFICE O/P EST MOD 30 MIN: CPT | Performed by: INTERNAL MEDICINE

## 2024-03-18 RX ORDER — ALBUTEROL SULFATE 90 UG/1
2 AEROSOL, METERED RESPIRATORY (INHALATION) EVERY 4 HOURS PRN
Qty: 8 G | Refills: 2 | Status: SHIPPED | OUTPATIENT
Start: 2024-03-18

## 2024-03-18 RX ORDER — PREDNISONE 20 MG/1
TABLET ORAL
Qty: 8 TABLET | Refills: 0 | OUTPATIENT
Start: 2024-03-18

## 2024-03-18 RX ORDER — MONTELUKAST SODIUM 10 MG/1
10 TABLET ORAL NIGHTLY
Qty: 90 TABLET | Refills: 3 | Status: SHIPPED | OUTPATIENT
Start: 2024-03-18

## 2024-03-18 RX ORDER — NYSTATIN 100000 U/G
OINTMENT TOPICAL
Qty: 30 G | Refills: 4 | Status: SHIPPED | OUTPATIENT
Start: 2024-03-18

## 2024-03-18 SDOH — ECONOMIC STABILITY: FOOD INSECURITY: WITHIN THE PAST 12 MONTHS, THE FOOD YOU BOUGHT JUST DIDN'T LAST AND YOU DIDN'T HAVE MONEY TO GET MORE.: NEVER TRUE

## 2024-03-18 SDOH — ECONOMIC STABILITY: INCOME INSECURITY: HOW HARD IS IT FOR YOU TO PAY FOR THE VERY BASICS LIKE FOOD, HOUSING, MEDICAL CARE, AND HEATING?: NOT HARD AT ALL

## 2024-03-18 SDOH — ECONOMIC STABILITY: FOOD INSECURITY: WITHIN THE PAST 12 MONTHS, YOU WORRIED THAT YOUR FOOD WOULD RUN OUT BEFORE YOU GOT MONEY TO BUY MORE.: NEVER TRUE

## 2024-03-18 ASSESSMENT — PATIENT HEALTH QUESTIONNAIRE - PHQ9
SUM OF ALL RESPONSES TO PHQ QUESTIONS 1-9: 0
2. FEELING DOWN, DEPRESSED OR HOPELESS: NOT AT ALL
SUM OF ALL RESPONSES TO PHQ QUESTIONS 1-9: 0
SUM OF ALL RESPONSES TO PHQ QUESTIONS 1-9: 0
SUM OF ALL RESPONSES TO PHQ9 QUESTIONS 1 & 2: 0
1. LITTLE INTEREST OR PLEASURE IN DOING THINGS: NOT AT ALL
SUM OF ALL RESPONSES TO PHQ QUESTIONS 1-9: 0

## 2024-03-18 NOTE — PROGRESS NOTES
Starla Condon (: 1980) is a 43 y.o. female, established patient, here for evaluation of the following chief complaint(s):  Chief Complaint   Patient presents with    Bowel     Been having soft stools and been having really bad headache when she wake up in the morning         Assessment and Plan:      Diagnosis Orders   1. Mild persistent asthma, uncomplicated  albuterol sulfate HFA (PROVENTIL;VENTOLIN;PROAIR) 108 (90 Base) MCG/ACT inhaler    montelukast (SINGULAIR) 10 MG tablet    Andre Cortez MD, Pulmonology, [place]      2. Dyspnea, unspecified type  [facility], [facility]    Andre Cortez MD, Pulmonology, [place]      3. Diarrhea of presumed infectious origin        4. Anemia, unspecified type        5. Intertriginous candidiasis  nystatin (MYCOSTATIN) 632128 UNIT/GM ointment          1:  Continue current medications.  Refill(s) and management reviewed.    1-2:  Referral(s) and referral coordination reviewed with patient at visit.  Alternative ENT number provided in instructions as reviewed, as well.    3:  Symptomatic mgt reviewed as per instructions--Imodium OTC PRN and increase fiber in interim.  Provided GI numbers in care persisting.    Clinical history c/w prolonged illness after son's similar GI illness.    This is primary reason she set up sooner follow-up visit today.    4:  Prefers future lab monitoring--as reviewed below.  She already has follow-up scheduled for this as below.    5:  Medication(s), management and follow-up based on response reviewed at visit.  Symptomatic management reviewed at visit.      Return for referral follow-up--as scheduled.  lab results and schedule of future lab studies reviewed with patient  reviewed medications and side effects in detail    For additional documentation of information and/or recommendations discussed this visit, please see notes in instructions.    Plan and evaluation (above) 
of Places Lived in the Last Year: Not on file     Unstable Housing in the Last Year: No   Interpersonal Safety: Not on file   Utilities: Not on file

## 2024-03-18 NOTE — PATIENT INSTRUCTIONS
1:  If needed, you can see an ENT provider (Ear, Nose and Throat specialists) at [facility]:    --[facility]:  Phone: [phone]    --[facility]:  Phone: [phone]      2:  May use Imodium OTC adult tab (loperamide)--take 2 tabs by mouth with first loose stool daily, with 1 additional tab with each subsequent loose stool.  May take up to 6 tabs daily.  If not improving in 2-3 days, please return to clinic to re-evaluate.      3:  If not improving/resolving, return here and/or can schedule with GI to evaluate further.    [facility] at [phone], [place]    [facility] at [phone].

## 2024-06-06 ENCOUNTER — OFFICE VISIT (OUTPATIENT)
Age: 44
End: 2024-06-06
Payer: MEDICAID

## 2024-06-06 VITALS
HEART RATE: 90 BPM | SYSTOLIC BLOOD PRESSURE: 129 MMHG | OXYGEN SATURATION: 97 % | BODY MASS INDEX: 42.88 KG/M2 | RESPIRATION RATE: 16 BRPM | TEMPERATURE: 98.6 F | WEIGHT: 218.4 LBS | DIASTOLIC BLOOD PRESSURE: 69 MMHG | HEIGHT: 60 IN

## 2024-06-06 DIAGNOSIS — B37.2 INTERTRIGINOUS CANDIDIASIS: ICD-10-CM

## 2024-06-06 DIAGNOSIS — G47.00 INSOMNIA, UNSPECIFIED TYPE: ICD-10-CM

## 2024-06-06 DIAGNOSIS — R19.4 BOWEL HABIT CHANGES: Primary | ICD-10-CM

## 2024-06-06 DIAGNOSIS — J45.30 MILD PERSISTENT ASTHMA, UNCOMPLICATED: ICD-10-CM

## 2024-06-06 DIAGNOSIS — F41.9 ANXIETY: ICD-10-CM

## 2024-06-06 PROCEDURE — 99214 OFFICE O/P EST MOD 30 MIN: CPT | Performed by: INTERNAL MEDICINE

## 2024-06-06 RX ORDER — MONTELUKAST SODIUM 10 MG/1
10 TABLET ORAL NIGHTLY
Qty: 90 TABLET | Refills: 3 | Status: CANCELLED | OUTPATIENT
Start: 2024-06-06

## 2024-06-06 RX ORDER — BUSPIRONE HYDROCHLORIDE 7.5 MG/1
7.5 TABLET ORAL NIGHTLY PRN
Qty: 60 TABLET | Refills: 3 | Status: SHIPPED | OUTPATIENT
Start: 2024-06-06 | End: 2024-09-04

## 2024-06-06 RX ORDER — ALBUTEROL SULFATE 90 UG/1
2 AEROSOL, METERED RESPIRATORY (INHALATION) EVERY 4 HOURS PRN
Qty: 8 G | Refills: 2 | Status: SHIPPED | OUTPATIENT
Start: 2024-06-06

## 2024-06-06 RX ORDER — NYSTATIN 100000 U/G
OINTMENT TOPICAL
Qty: 30 G | Refills: 4 | Status: CANCELLED | OUTPATIENT
Start: 2024-06-06

## 2024-06-06 RX ORDER — NYSTATIN 100000 U/G
OINTMENT TOPICAL
Qty: 60 G | Refills: 5 | Status: SHIPPED | OUTPATIENT
Start: 2024-06-06

## 2024-06-06 RX ORDER — SPIRONOLACTONE 50 MG/1
50 TABLET, FILM COATED ORAL DAILY
Qty: 30 TABLET | Status: CANCELLED | OUTPATIENT
Start: 2024-06-06

## 2024-06-06 ASSESSMENT — PATIENT HEALTH QUESTIONNAIRE - PHQ9
2. FEELING DOWN, DEPRESSED OR HOPELESS: NOT AT ALL
SUM OF ALL RESPONSES TO PHQ9 QUESTIONS 1 & 2: 0
SUM OF ALL RESPONSES TO PHQ QUESTIONS 1-9: 0
SUM OF ALL RESPONSES TO PHQ QUESTIONS 1-9: 0
1. LITTLE INTEREST OR PLEASURE IN DOING THINGS: NOT AT ALL
SUM OF ALL RESPONSES TO PHQ QUESTIONS 1-9: 0
SUM OF ALL RESPONSES TO PHQ QUESTIONS 1-9: 0

## 2024-06-06 NOTE — PATIENT INSTRUCTIONS
If needed:  [facility] at [phone].      If the buspirone isn't helping, please schedule a virtual visit to review alternatives (trazodone or others).

## 2024-06-06 NOTE — PROGRESS NOTES
Starla Condon (: 1980) is a 44 y.o. female, established patient, here for evaluation of the following chief complaint(s):  Chief Complaint   Patient presents with    Follow-up     6 month follow up       Assessment and Plan:      Diagnosis Orders   1. Bowel habit changes  [facility] - Martha Cummings MD, Gastroenterology, [place]      2. Mild persistent asthma, uncomplicated  albuterol sulfate HFA (PROVENTIL;VENTOLIN;PROAIR) 108 (90 Base) MCG/ACT inhaler      3. Intertriginous candidiasis  nystatin (MYCOSTATIN) 656719 UNIT/GM ointment      4. BMI 40.0-44.9, adult ([facility])  [facility] - Denae Patel RDN, Nutrition Services, [place]      5. Anxiety  busPIRone (BUSPAR) 7.5 MG tablet      6. Insomnia, unspecified type  busPIRone (BUSPAR) 7.5 MG tablet          1:  Referral(s) and referral coordination reviewed with patient at visit.    2-3:  Refill(s) and management reviewed.    4:  Referral(s) and referral coordination reviewed with patient at visit.    5-6:  Medication(s), management and follow-up based on response reviewed at visit.  Symptomatic management reviewed at visit.    Medication dosing, side effects and alternatives reviewed.      Requested Prescriptions     Signed Prescriptions Disp Refills    albuterol sulfate HFA (PROVENTIL;VENTOLIN;PROAIR) 108 (90 Base) MCG/ACT inhaler 8 g 2     Sig: Inhale 2 puffs into the lungs every 4 hours as needed for Wheezing    nystatin (MYCOSTATIN) 002136 UNIT/GM ointment 60 g 5     Sig: Apply topically 2-3 times daily as needed to rash on abdomen/under skin folds as directed.    busPIRone (BUSPAR) 7.5 MG tablet 60 tablet 3     Sig: Take 1 tablet by mouth nightly as needed (Anxiety, Sleep) May increase to 2 tabs (15mg) nightly as needed/directed.       Return in about 6 months (around 2024), or if symptoms worsen or fail to improve, for yearly physical, (fasting labs)--as scheduled.  lab results and schedule of future lab studies reviewed with

## 2024-06-06 NOTE — PROGRESS NOTES
Rm: 16  Fasting: No  Pt states that she wants something to put her to sleep and stay al sleep but nothing to strong   No    Chief Complaint   Patient presents with    Follow-up     6 month follow up       /69 (Site: Right Upper Arm, Position: Sitting, Cuff Size: Large Adult)   Pulse 90   Temp 98.6 °F (37 °C) (Oral)   Resp 16   Ht 1.524 m (5')   Wt 99.1 kg (218 lb 6.4 oz)   SpO2 97%   BMI 42.65 kg/m²     1. Have you been to the ER, urgent care clinic since your last visit?  Hospitalized since your last visit?No    2. Have you seen or consulted any other health care providers outside of the [facility] since your last visit?  Include any pap smears or colon screening. No          Social Determinants of Health     Tobacco Use: Low Risk  (6/6/2024)    Patient History     Smoking Tobacco Use: Never     Smokeless Tobacco Use: Never     Passive Exposure: Not on file   Alcohol Use: Not on file   Financial Resource Strain: Low Risk  (3/18/2024)    Overall Financial Resource Strain (CARDIA)     Difficulty of Paying Living Expenses: Not hard at all   Food Insecurity: No Food Insecurity (3/18/2024)    Hunger Vital Sign     Worried About Running Out of Food in the Last Year: Never true     Ran Out of Food in the Last Year: Never true   Transportation Needs: Unknown (3/18/2024)    PRAPARE - Transportation     Lack of Transportation (Medical): Not on file     Lack of Transportation (Non-Medical): No   Physical Activity: Insufficiently Active (7/25/2022)    Exercise Vital Sign     Days of Exercise per Week: 2 days     Minutes of Exercise per Session: 30 min   Stress: Not on file   Social Connections: Not on file   Intimate Partner Violence: Not At Risk (7/25/2022)    Humiliation, Afraid, Rape, and Kick questionnaire     Fear of Current or Ex-Partner: No     Emotionally Abused: No     Physically Abused: No     Sexually Abused: No   Depression: Not at risk (6/6/2024)    PHQ-2     PHQ-2 Score: 0   Housing

## 2024-07-01 ENCOUNTER — OFFICE VISIT (OUTPATIENT)
Age: 44
End: 2024-07-01
Payer: MEDICAID

## 2024-07-01 VITALS
BODY MASS INDEX: 42.92 KG/M2 | WEIGHT: 218.6 LBS | HEIGHT: 60 IN | DIASTOLIC BLOOD PRESSURE: 65 MMHG | SYSTOLIC BLOOD PRESSURE: 127 MMHG | HEART RATE: 81 BPM

## 2024-07-01 DIAGNOSIS — E66.01 CLASS 3 SEVERE OBESITY WITH SERIOUS COMORBIDITY AND BODY MASS INDEX (BMI) OF 40.0 TO 44.9 IN ADULT, UNSPECIFIED OBESITY TYPE (HCC): ICD-10-CM

## 2024-07-01 DIAGNOSIS — E05.90 HYPERTHYROIDISM: Primary | ICD-10-CM

## 2024-07-01 PROCEDURE — G2211 COMPLEX E/M VISIT ADD ON: HCPCS | Performed by: INTERNAL MEDICINE

## 2024-07-01 PROCEDURE — 99214 OFFICE O/P EST MOD 30 MIN: CPT | Performed by: INTERNAL MEDICINE

## 2024-07-01 RX ORDER — SEMAGLUTIDE 0.5 MG/.5ML
0.5 INJECTION, SOLUTION SUBCUTANEOUS
Qty: 2 ML | Refills: 0 | Status: SHIPPED | OUTPATIENT
Start: 2024-07-01

## 2024-07-01 NOTE — PROGRESS NOTES
(MYCOSTATIN) 396303 UNIT/GM ointment Apply topically 2-3 times daily as needed to rash on abdomen/under skin folds as directed.    busPIRone (BUSPAR) 7.5 MG tablet Take 1 tablet by mouth nightly as needed (Anxiety, Sleep) May increase to 2 tabs (15mg) nightly as needed/directed.    montelukast (SINGULAIR) 10 MG tablet Take 1 tablet by mouth nightly    methIMAzole (TAPAZOLE) 5 MG tablet TAKE 0.5 TABLETS BY MOUTH IN THE MORNING    WINLEVI 1 % CREA APPLY CREAM TWICE DAILY    dapsone (ACZONE) 7.5 % GEL topical gel APPLY GEL TO FACE IN MORNINGS    diclofenac sodium (VOLTAREN) 1 % GEL APPLY 3-4 GRAMS TOPICALLY TO AFFECTED AREA TWICE A DAY AS DIRECTED    ibuprofen (ADVIL;MOTRIN) 800 MG tablet Take by mouth 3 times daily as needed    spironolactone (ALDACTONE) 50 MG tablet Take 1 tablet by mouth daily    tretinoin (RETIN-A) 0.05 % cream APPLY CREAM EVERY OTHER NIGHT X 1 MONTH AND THEN NIGHTLY AS TOLERATED    Semaglutide-Weight Management (WEGOVY) 0.25 MG/0.5ML SOAJ SC injection Inject 0.25 mg into the skin every 7 days (Patient not taking: Reported on 7/1/2024)     No current facility-administered medications for this visit.     No Known Allergies  Review of Systems:  - Cardiovascular: no chest pain  - Neurological: no tremors  - Integumentary: skin is normal    Physical Examination:  Blood pressure 127/65, pulse 81, height 1.524 m (5'), weight 99.2 kg (218 lb 9.6 oz).  General: pleasant, no distress, good eye contact   Neck: no thyromegaly or thyroid bruits  Cardiovascular: regular, normal rate, nl s1 and s2, no m/r/g   Integumentary: skin is normal, no edema  Neurological: reflexes 2+ at biceps, no tremors  Psychiatric: normal mood and affect    Data Reviewed:   - none new for review    Assessment/Plan:   1) Hyperthyroidism > Pt is clinically euthyroid on the MMI 2.5mg daily. I will order TFTs and adjust her dose as needed.     2) Obesity > Pt did not tolerate Phentermine, her insurance has refused Saxenda, but approved

## 2024-07-02 DIAGNOSIS — E05.90 HYPERTHYROIDISM: Primary | ICD-10-CM

## 2024-07-02 LAB
T3 SERPL-MCNC: 94 NG/DL (ref 71–180)
T4 FREE SERPL-MCNC: 1.27 NG/DL (ref 0.82–1.77)
TSH SERPL DL<=0.005 MIU/L-ACNC: 1.48 UIU/ML (ref 0.45–4.5)

## 2024-07-04 DIAGNOSIS — G47.00 INSOMNIA, UNSPECIFIED TYPE: ICD-10-CM

## 2024-07-04 DIAGNOSIS — F41.9 ANXIETY: ICD-10-CM

## 2024-07-05 RX ORDER — BUSPIRONE HYDROCHLORIDE 7.5 MG/1
7.5 TABLET ORAL NIGHTLY PRN
Qty: 180 TABLET | Refills: 2 | OUTPATIENT
Start: 2024-07-05 | End: 2024-10-03

## 2024-07-05 NOTE — TELEPHONE ENCOUNTER
Duplicate request:   06/06/2024 Buspar 7.5 mg #60 with 3 refills was sent to [id number].     For Pharmacy Admin Tracking Only    Program: Medication Refill  Intervention Detail: Discontinued Rx: 1, reason: Duplicate Therapy  Time Spent (min): 5    Requested Prescriptions     Pending Prescriptions Disp Refills    busPIRone (BUSPAR) 7.5 MG tablet [Pharmacy Med Name: BUSPIRONE HCL 7.5 MG TABLET] 180 tablet 2     Sig: TAKE 1 TABLET BY MOUTH NIGHTLY AS NEEDED (ANXIETY, SLEEP) MAY INCREASE TO 2 TABS (15MG) NIGHTLY AS NEEDED/DIRECTED.

## 2024-07-22 DIAGNOSIS — E05.90 HYPERTHYROIDISM: Primary | ICD-10-CM

## 2024-07-22 DIAGNOSIS — E66.01 CLASS 3 SEVERE OBESITY WITH SERIOUS COMORBIDITY AND BODY MASS INDEX (BMI) OF 40.0 TO 44.9 IN ADULT, UNSPECIFIED OBESITY TYPE (HCC): ICD-10-CM

## 2024-08-09 DIAGNOSIS — F41.9 ANXIETY: ICD-10-CM

## 2024-08-09 DIAGNOSIS — G47.00 INSOMNIA, UNSPECIFIED TYPE: ICD-10-CM

## 2024-08-12 LAB — MAMMOGRAPHY, EXTERNAL: NORMAL

## 2024-08-13 DIAGNOSIS — E05.90 HYPERTHYROIDISM: ICD-10-CM

## 2024-08-14 NOTE — TELEPHONE ENCOUNTER
Future Appointments:  Future Appointments   Date Time Provider Department Center   12/9/2024  9:20 AM [facility] Bliss MD [facility]   1/7/2025  8:50 AM Francesco Yang MD [facility]        Last Appointment With Me:  6/6/2024     Requested Prescriptions     Pending Prescriptions Disp Refills    busPIRone (BUSPAR) 7.5 MG tablet [Pharmacy Med Name: BUSPIRONE HCL 7.5 MG TABLET] 180 tablet 2     Sig: TAKE 1 TABLET BY MOUTH NIGHTLY AS NEEDED (ANXIETY, SLEEP) MAY INCREASE TO 2 TABS (15MG) NIGHTLY AS NEEDED/DIRECTED.

## 2024-08-15 NOTE — TELEPHONE ENCOUNTER
Lyticst message to pt to clarify refill request for buspirone.    Requested Prescriptions     Pending Prescriptions Disp Refills    busPIRone (BUSPAR) 7.5 MG tablet [Pharmacy Med Name: BUSPIRONE HCL 7.5 MG TABLET] 180 tablet 2     Sig: TAKE 1 TABLET BY MOUTH NIGHTLY AS NEEDED (ANXIETY, SLEEP) MAY INCREASE TO 2 TABS (15MG) NIGHTLY AS NEEDED/DIRECTED.       Last prescription was 6-6-24 for #60 with 3 refills.  If she received #60 and only taking up to 2 nightly, should not need refills at this time.

## 2024-08-20 RX ORDER — BUSPIRONE HYDROCHLORIDE 7.5 MG/1
7.5-15 TABLET ORAL NIGHTLY PRN
Qty: 180 TABLET | Refills: 1 | Status: SHIPPED | OUTPATIENT
Start: 2024-08-20 | End: 2025-02-16

## 2024-08-20 NOTE — TELEPHONE ENCOUNTER
Refill request(s) approved--buspirone.    Requested Prescriptions     Signed Prescriptions Disp Refills    busPIRone (BUSPAR) 7.5 MG tablet 180 tablet 1     Sig: Take 1-2 tablets by mouth nightly as needed (Anxiety, Sleep)     Authorizing Provider: ALISSA CARR

## 2024-08-27 ENCOUNTER — OFFICE VISIT (OUTPATIENT)
Age: 44
End: 2024-08-27
Payer: MEDICAID

## 2024-08-27 VITALS
TEMPERATURE: 99.1 F | RESPIRATION RATE: 16 BRPM | DIASTOLIC BLOOD PRESSURE: 87 MMHG | HEART RATE: 96 BPM | HEIGHT: 60 IN | BODY MASS INDEX: 42.25 KG/M2 | SYSTOLIC BLOOD PRESSURE: 150 MMHG | WEIGHT: 215.2 LBS | OXYGEN SATURATION: 95 %

## 2024-08-27 DIAGNOSIS — R03.0 ELEVATED BP WITHOUT DIAGNOSIS OF HYPERTENSION: ICD-10-CM

## 2024-08-27 DIAGNOSIS — G47.00 INSOMNIA, UNSPECIFIED TYPE: ICD-10-CM

## 2024-08-27 DIAGNOSIS — J45.30 MILD PERSISTENT ASTHMA, UNCOMPLICATED: Primary | ICD-10-CM

## 2024-08-27 DIAGNOSIS — Z02.89 ENCOUNTER FOR COMPLETION OF FORM WITH PATIENT: ICD-10-CM

## 2024-08-27 DIAGNOSIS — F41.9 ANXIETY: ICD-10-CM

## 2024-08-27 PROCEDURE — 99214 OFFICE O/P EST MOD 30 MIN: CPT | Performed by: INTERNAL MEDICINE

## 2024-08-27 SDOH — ECONOMIC STABILITY: FOOD INSECURITY: WITHIN THE PAST 12 MONTHS, THE FOOD YOU BOUGHT JUST DIDN'T LAST AND YOU DIDN'T HAVE MONEY TO GET MORE.: NEVER TRUE

## 2024-08-27 SDOH — ECONOMIC STABILITY: FOOD INSECURITY: WITHIN THE PAST 12 MONTHS, YOU WORRIED THAT YOUR FOOD WOULD RUN OUT BEFORE YOU GOT MONEY TO BUY MORE.: NEVER TRUE

## 2024-08-27 SDOH — ECONOMIC STABILITY: INCOME INSECURITY: HOW HARD IS IT FOR YOU TO PAY FOR THE VERY BASICS LIKE FOOD, HOUSING, MEDICAL CARE, AND HEATING?: NOT HARD AT ALL

## 2024-08-27 ASSESSMENT — PATIENT HEALTH QUESTIONNAIRE - PHQ9
SUM OF ALL RESPONSES TO PHQ9 QUESTIONS 1 & 2: 0
SUM OF ALL RESPONSES TO PHQ QUESTIONS 1-9: 0
SUM OF ALL RESPONSES TO PHQ QUESTIONS 1-9: 0
2. FEELING DOWN, DEPRESSED OR HOPELESS: NOT AT ALL
SUM OF ALL RESPONSES TO PHQ QUESTIONS 1-9: 0
SUM OF ALL RESPONSES TO PHQ QUESTIONS 1-9: 0
1. LITTLE INTEREST OR PLEASURE IN DOING THINGS: NOT AT ALL

## 2024-08-27 NOTE — PROGRESS NOTES
RM: 16  Chief Complaint   Patient presents with    Follow-up    PAPERWORK       Vitals:    08/27/24 1217   BP: (!) 149/96   Pulse: 96   Resp: 16   Temp: 99.1 °F (37.3 °C)   SpO2: 95%      FASTING: No  Have you been to the ER, urgent care clinic since your last visit?  Hospitalized since your last visit?\"    YES - When: approximately 7  weeks ago.  Where and Why: [facility] .  “Have you seen or consulted any other health care providers [facility] of [facility] since [facility] visit?”    NO    Click Here for Release of Records Request

## 2024-08-27 NOTE — PROGRESS NOTES
Starla Condon (: 1980) is a 44 y.o. female, established patient, here for evaluation of the following chief complaint(s):  Chief Complaint   Patient presents with    Follow-up    PAPERWORK        Assessment and Plan:      Diagnosis Orders   1. Mild persistent asthma, uncomplicated        2. Anxiety        3. Insomnia, unspecified type        4. Elevated BP without diagnosis of hypertension        5. Encounter for completion of form with patient--FMLA--recent asthma exacerbation.            1,2-3:  Continue current medications.    4:  Monitoring and follow-up reviewed.    5:  Form reviewed and completed at visit.      Return in about 2 weeks (around 9/10/2024) for Blood Pressure follow-up--Friday nurse visit; AND routine follow-up as scheduled.  reviewed diet, exercise and weight control  reviewed medications and side effects in detail    For additional documentation of information and/or recommendations discussed this visit, please see notes in instructions.    Plan and evaluation (above) reviewed with pt at visit  Patient voiced understanding of plan and provided with time to ask/review questions.  After Visit Summary (AVS) provided to pt after visit with additional instructions as needed/reviewed.      Future Appointments   Date Time Provider Department Center   2024  9:20 AM Gerson Bliss MD [facility]   2025  8:50 AM Francesco Yang MD RDE [place] [address] BS [facility]   --Updated future visits after patient check-out.      On this date 2024 I have spent 32 minutes reviewing previous notes, test results and face to face with the patient discussing the diagnosis and importance of compliance with the treatment plan as well as documenting on the day of the visit.      History of Present Illness:     Notes (nursing/rooming note copied below in italics and/or gray-shaded):  As above and in nursing note.    FASTING: No     Here for follow-up and review of work paperwork.  She

## 2024-08-27 NOTE — PATIENT INSTRUCTIONS
Will send you a copy of paperwork/confirmation once faxed this afternoon.      Vitals:    08/27/24 1217 08/27/24 1221 08/27/24 1236   BP: (!) 149/96 (!) 147/86 (!) 150/87   Site: Left Upper Arm Left Upper Arm Right Upper Arm   Position: Sitting Sitting Sitting   Cuff Size: Large Adult Medium Adult Medium Adult   Pulse: 96     Resp: 16     Temp: 99.1 °F (37.3 °C)     TempSrc: Oral     SpO2: 95%     Weight: 97.6 kg (215 lb 3.2 oz)     Height: 1.524 m (5')

## 2024-08-29 RX ORDER — SEMAGLUTIDE 0.25 MG/.5ML
0.25 INJECTION, SOLUTION SUBCUTANEOUS
Qty: 2 ML | Refills: 3 | OUTPATIENT
Start: 2024-08-29

## 2024-08-29 RX ORDER — SEMAGLUTIDE 1 MG/.5ML
1 INJECTION, SOLUTION SUBCUTANEOUS
Qty: 2 ML | Refills: 0 | Status: SHIPPED | OUTPATIENT
Start: 2024-08-29

## 2024-10-08 ENCOUNTER — ANESTHESIA (OUTPATIENT)
Facility: HOSPITAL | Age: 44
End: 2024-10-08
Payer: MEDICAID

## 2024-10-08 ENCOUNTER — ANESTHESIA EVENT (OUTPATIENT)
Facility: HOSPITAL | Age: 44
End: 2024-10-08
Payer: MEDICAID

## 2024-10-08 ENCOUNTER — HOSPITAL ENCOUNTER (OUTPATIENT)
Facility: HOSPITAL | Age: 44
Setting detail: OUTPATIENT SURGERY
Discharge: HOME | End: 2024-10-08
Attending: STUDENT IN AN ORGANIZED HEALTH CARE EDUCATION/TRAINING PROGRAM | Admitting: STUDENT IN AN ORGANIZED HEALTH CARE EDUCATION/TRAINING PROGRAM
Payer: MEDICAID

## 2024-10-08 VITALS
HEIGHT: 60 IN | WEIGHT: 216 LBS | HEART RATE: 92 BPM | TEMPERATURE: 97.7 F | DIASTOLIC BLOOD PRESSURE: 80 MMHG | BODY MASS INDEX: 42.41 KG/M2 | OXYGEN SATURATION: 97 % | RESPIRATION RATE: 19 BRPM | SYSTOLIC BLOOD PRESSURE: 135 MMHG

## 2024-10-08 LAB — HCG UR QL: NEGATIVE

## 2024-10-08 PROCEDURE — 2580000003 HC RX 258: Performed by: NURSE ANESTHETIST, CERTIFIED REGISTERED

## 2024-10-08 PROCEDURE — 3600007502: Performed by: STUDENT IN AN ORGANIZED HEALTH CARE EDUCATION/TRAINING PROGRAM

## 2024-10-08 PROCEDURE — 2580000003 HC RX 258: Performed by: STUDENT IN AN ORGANIZED HEALTH CARE EDUCATION/TRAINING PROGRAM

## 2024-10-08 PROCEDURE — 88305 TISSUE EXAM BY PATHOLOGIST: CPT

## 2024-10-08 PROCEDURE — 81025 URINE PREGNANCY TEST: CPT

## 2024-10-08 PROCEDURE — 3700000000 HC ANESTHESIA ATTENDED CARE: Performed by: STUDENT IN AN ORGANIZED HEALTH CARE EDUCATION/TRAINING PROGRAM

## 2024-10-08 PROCEDURE — 2709999900 HC NON-CHARGEABLE SUPPLY: Performed by: STUDENT IN AN ORGANIZED HEALTH CARE EDUCATION/TRAINING PROGRAM

## 2024-10-08 PROCEDURE — 7100000011 HC PHASE II RECOVERY - ADDTL 15 MIN: Performed by: STUDENT IN AN ORGANIZED HEALTH CARE EDUCATION/TRAINING PROGRAM

## 2024-10-08 PROCEDURE — 3600007512: Performed by: STUDENT IN AN ORGANIZED HEALTH CARE EDUCATION/TRAINING PROGRAM

## 2024-10-08 PROCEDURE — 6360000002 HC RX W HCPCS: Performed by: NURSE ANESTHETIST, CERTIFIED REGISTERED

## 2024-10-08 PROCEDURE — 7100000010 HC PHASE II RECOVERY - FIRST 15 MIN: Performed by: STUDENT IN AN ORGANIZED HEALTH CARE EDUCATION/TRAINING PROGRAM

## 2024-10-08 PROCEDURE — 2500000003 HC RX 250 WO HCPCS: Performed by: NURSE ANESTHETIST, CERTIFIED REGISTERED

## 2024-10-08 PROCEDURE — 3700000001 HC ADD 15 MINUTES (ANESTHESIA): Performed by: STUDENT IN AN ORGANIZED HEALTH CARE EDUCATION/TRAINING PROGRAM

## 2024-10-08 RX ORDER — SODIUM CHLORIDE 0.9 % (FLUSH) 0.9 %
5-40 SYRINGE (ML) INJECTION EVERY 12 HOURS SCHEDULED
Status: DISCONTINUED | OUTPATIENT
Start: 2024-10-08 | End: 2024-10-08 | Stop reason: HOSPADM

## 2024-10-08 RX ORDER — SODIUM CHLORIDE 0.9 % (FLUSH) 0.9 %
5-40 SYRINGE (ML) INJECTION PRN
Status: DISCONTINUED | OUTPATIENT
Start: 2024-10-08 | End: 2024-10-08 | Stop reason: HOSPADM

## 2024-10-08 RX ORDER — SODIUM CHLORIDE 9 MG/ML
25 INJECTION, SOLUTION INTRAVENOUS PRN
Status: DISCONTINUED | OUTPATIENT
Start: 2024-10-08 | End: 2024-10-08 | Stop reason: HOSPADM

## 2024-10-08 RX ORDER — 0.9 % SODIUM CHLORIDE 0.9 %
INTRAVENOUS SOLUTION INTRAVENOUS
Status: DISCONTINUED | OUTPATIENT
Start: 2024-10-08 | End: 2024-10-08 | Stop reason: SDUPTHER

## 2024-10-08 RX ADMIN — PROPOFOL 100 MG: 10 INJECTION, EMULSION INTRAVENOUS at 13:19

## 2024-10-08 RX ADMIN — SODIUM CHLORIDE 25 ML: 9 INJECTION, SOLUTION INTRAVENOUS at 13:04

## 2024-10-08 RX ADMIN — SODIUM CHLORIDE 500 ML: 900 INJECTION, SOLUTION INTRAVENOUS at 13:29

## 2024-10-08 RX ADMIN — PROPOFOL 50 MG: 10 INJECTION, EMULSION INTRAVENOUS at 13:28

## 2024-10-08 RX ADMIN — PROPOFOL 100 MG: 10 INJECTION, EMULSION INTRAVENOUS at 13:25

## 2024-10-08 RX ADMIN — LIDOCAINE HYDROCHLORIDE 100 MG: 20 INJECTION, SOLUTION EPIDURAL; INFILTRATION; INTRACAUDAL; PERINEURAL at 13:19

## 2024-10-08 ASSESSMENT — PAIN - FUNCTIONAL ASSESSMENT: PAIN_FUNCTIONAL_ASSESSMENT: NONE - DENIES PAIN

## 2024-10-08 NOTE — H&P
Gastroenterology History and Physical    Patient: Starla ALEXANDRA AugustinRoseanna    Physician: Martha Cummings MD    Vital Signs: Blood pressure (!) 153/91, pulse (!) 101, temperature 97.9 °F (36.6 °C), temperature source Temporal, resp. rate 19, height 1.524 m (5'), weight 98 kg (216 lb), last menstrual period 2024, SpO2 98%.    Allergies: No Known Allergies    Procedure: colonoscopy    Indication: change in bowel habits, alternating constipation/diarrhea    History:  Past Medical History:   Diagnosis Date    Anxiety     History of panic attacks.    Asthma     Chronic kidney disease     pt unaware    Endocrine disease     hyperthyroidism    Hepatitis B immune 2022    Hep B sAb (+) with negative sAg and total core Ab.    Hypothyroidism     Patellar tracking disorder     Bilat.  Ortho eval with Dr. Lorenzo[facility].  Primary osteoarthritis right knee.      Past Surgical History:   Procedure Laterality Date     SECTION        Social History     Socioeconomic History    Marital status: Single     Spouse name: None    Number of children: None    Years of education: None    Highest education level: None   Tobacco Use    Smoking status: Never    Smokeless tobacco: Never   Vaping Use    Vaping status: Never Used   Substance and Sexual Activity    Alcohol use: Yes     Comment: Social drinker and sometimes on holidays.    Drug use: No    Sexual activity: Not Currently     Partners: Male     Social Determinants of Health     Financial Resource Strain: Low Risk  (2024)    Overall Financial Resource Strain (CARDIA)     Difficulty of Paying Living Expenses: Not hard at all   Food Insecurity: No Food Insecurity (2024)    Hunger Vital Sign     Worried About Running Out of Food in the Last Year: Never true     Ran Out of Food in the Last Year: Never true   Transportation Needs: Unknown (2024)    PRAPARE - Transportation     Lack of Transportation (Non-Medical): No   Physical Activity:

## 2024-10-08 NOTE — DISCHARGE INSTRUCTIONS
Starla Condon  [id number]  1980    COLONOSCOPY DISCHARGE INSTRUCTIONS  Discomfort:  Redness at IV site- apply warm compress to area; if redness or soreness persist- contact your physician  There may be a slight amount of blood passed from the rectum  Gaseous discomfort- walking, belching will help relieve any discomfort  You may not operate a vehicle for 12 hours  You may not engage in an occupation involving machinery or appliances for rest of today  You may not drink alcoholic beverages for at least 12 hours  Avoid making any critical decisions for at least 24 hour  DIET:   High fiber diet.   - however -  remember your colon is empty and a heavy meal will produce gas.   Avoid these foods:  vegetables, fried / greasy foods, carbonated drinks for today    BLOOD-THINNERS:     MEDICATION:  (See attached)     ACTIVITY:  You may not resume your normal daily activities until tomorrow AM; it is recommended that you spend the remainder of the day resting -  avoid any strenuous activity.    CALL M.D. WITH ANY SIGN OF:   Increasing pain, nausea, vomiting  Abdominal distension (swelling)  New increased bleeding (oral or rectal)  Fever (chills)  Pain in chest area  Bloody discharge from nose or mouth  Shortness of breath    You may not  take any Advil, Aspirin, Ibuprofen, Motrin, Aleve, or Goody’s for 10 days, ONLY  Tylenol as needed for pain.    IMPRESSION:    Impression:    Mild pandiverticulosis  Small ascending colon polyp removed  Otherwise normal colonoscopy      Follow-up Instructions:  Results of any biopsies (if taken) will typically be available in about 1 week.  We will attempt to notify you of any biopsy results.  Please call Dr. Cummings for results in 7-10 days if we have not notified you sooner.  Please call Dr. Cummings with other questions about the results of your procedure  Telephone #[phone]  Additional instructions: Your symptoms are most likely related to irritable bowel syndrome

## 2024-10-08 NOTE — ANESTHESIA PRE PROCEDURE
Department of Anesthesiology  Preprocedure Note       Name:  Starla Condon   Age:  44 y.o.  :  1980                                          MRN:  [id number]         Date:  10/8/2024      Surgeon: Surgeon(s):  Martha Cummings MD    Procedure: Procedure(s):  COLONOSCOPY WITH BIOPSY WITH POLYPECTOMY    Medications prior to admission:   Prior to Admission medications    Medication Sig Start Date End Date Taking? Authorizing Provider   Semaglutide-Weight Management (WEGOVY) 1 MG/0.5ML SOAJ SC injection Inject 1 mg into the skin every 7 days Her insurance has approved the Wegovy 24   Francesco Yang MD   busPIRone (BUSPAR) 7.5 MG tablet Take 1-2 tablets by mouth nightly as needed (Anxiety, Sleep) 24  Gerson Bliss MD   albuterol sulfate HFA (PROVENTIL;VENTOLIN;PROAIR) 108 (90 Base) MCG/ACT inhaler Inhale 2 puffs into the lungs every 4 hours as needed for Wheezing 24   Gerson Bliss MD   nystatin (MYCOSTATIN) 777709 UNIT/GM ointment Apply topically 2-3 times daily as needed to rash on abdomen/under skin folds as directed. 24   Gerson Bliss MD   montelukast (SINGULAIR) 10 MG tablet Take 1 tablet by mouth nightly 3/18/24   Gerson Bliss MD   methIMAzole (TAPAZOLE) 5 MG tablet TAKE 0.5 TABLETS BY MOUTH IN THE MORNING 23   Francesco Yang MD   WINLEVI 1 % CREA APPLY CREAM TWICE DAILY 23   Disha Mary MD   dapsone (ACZONE) 7.5 % GEL topical gel APPLY GEL TO FACE IN MORNINGS 23   Disha Mary MD   diclofenac sodium (VOLTAREN) 1 % GEL APPLY 3-4 GRAMS TOPICALLY TO AFFECTED AREA TWICE A DAY AS DIRECTED 22   Automatic Reconciliation, Ar   ibuprofen (ADVIL;MOTRIN) 800 MG tablet Take by mouth 3 times daily as needed 7/10/22   Automatic Reconciliation, Ar   spironolactone (ALDACTONE) 50 MG tablet Take 1 tablet by mouth daily 23   Automatic Reconciliation, Ar   tretinoin (RETIN-A) 0.05 % cream APPLY CREAM

## 2024-10-08 NOTE — PROGRESS NOTES
Endoscopy Case End Note:    1330:  Procedure scope was pre-cleaned, per protocol, at bedside by MATTHEW Jacinto      1330:  Report received from anesthesia - MELL Tellez.  See anesthesia flowsheet for intra-procedure vital signs and events.    1330:  glasses returned to patient.    Abd soft, non-distended    Pt then taken to recovery area and SBAR report to receiving nurse

## 2024-10-08 NOTE — PROGRESS NOTES
ARRIVAL INFORMATION:  Verified patient name and date of birth, scheduled procedure, and informed consent.     Belongings with patient include:  Clothing,Glasses        GI FOCUSED ASSESSMENT:  Neuro: Awake, alert, oriented x4  Respiratory: even and unlabored   GI: soft and non-distended      Education:Reviewed general discharge instructions and  information- ok to text karen

## 2024-10-08 NOTE — ANESTHESIA POSTPROCEDURE EVALUATION
Department of Anesthesiology  Postprocedure Note    Patient: Starla Condon  MRN: [id number]  YOB: 1980  Date of evaluation: 10/8/2024    Procedure Summary       Date: 10/08/24 Room / Location: [facility] ENDO 03 / [facility] END[facility]COPY    Anesthesia Start: 1318 Anesthesia Stop: 1332    Procedure: COLONOSCOPY WITH BIOPSY WITH POLYPECTOMY Diagnosis: Change in bowel habits    Surgeons: Martha Cummings MD Responsible Provider: Brandee Molina MD    Anesthesia Type: MAC ASA Status: 2            Anesthesia Type: MAC    Cricket Phase I: Cricket Score: 10    Cricket Phase II: Cricket Score: 10    Anesthesia Post Evaluation    Patient location during evaluation: bedside  Patient participation: complete - patient participated  Level of consciousness: awake and alert  Pain scale: Controlled per protocol.  Airway patency: patent  Nausea & Vomiting: no nausea and no vomiting  Cardiovascular status: hemodynamically stable  Respiratory status: acceptable  Hydration status: stable  Multimodal analgesia pain management approach  Pain management: adequate    No notable events documented.

## 2024-10-08 NOTE — OP NOTE
[facility]                  Colonoscopy Operative Report    10/8/2024      Starla Condon  [id number]  1980    Procedure Type:   Colonoscopy with polypectomy (cold snare)     Indications:    Change in bowel habits     Pre-operative Diagnosis: see indication above    Post-operative Diagnosis:  See findings below    :  Martha Cummings MD    Referring Provider: Gerson Bliss MD      Sedation:  MAC anesthesia Propofol    Pre-Procedural Exam:      Airway: clear,  No airway problems anticipated  Heart: RRR, without gallops or rubs  Lungs: clear bilaterally without wheezes, crackles, or rhonchi  Abdomen: soft, nontender, nondistended, bowel sounds present  Mental Status: awake, alert and oriented to person, place and time     Procedure Details:  After informed consent was obtained with all risks and benefits of procedure explained and preoperative exam completed, the patient was taken to the endoscopy suite and placed in the left lateral decubitus position.  Upon sequential sedation as per above, a digital rectal exam was performed .  The Olympus videocolonoscope  was inserted in the rectum and carefully advanced to the terminal ileum.  The cecum was identified by the ileocecal valve and appendiceal orifice.  The quality of preparation was excellent BBPS=9.  The colonoscope was slowly withdrawn with careful evaluation between folds. Retroflexion in the rectum was completed demonstrating internal hemorrhoids.     Findings:   Rectum: normal, grade I internal hemorrhoids  Sigmoid: mild diverticulosis;  Descending Colon: mild diverticulosis;  Transverse Colon: mild diverticulosis;  Ascending Colon: mild diverticulosis; a 3mm sessile polyp was removed with cold snare polypectomy  Cecum: normal  Terminal Ileum: normal      Specimen Removed:   Jar 1) ascending colon polyp    Complications: None.     EBL:  None.    Impression:     Mild pandiverticulosis  Small

## 2024-10-14 ENCOUNTER — OFFICE VISIT (OUTPATIENT)
Age: 44
End: 2024-10-14
Payer: MEDICAID

## 2024-10-14 VITALS
DIASTOLIC BLOOD PRESSURE: 86 MMHG | OXYGEN SATURATION: 93 % | BODY MASS INDEX: 42.13 KG/M2 | HEART RATE: 109 BPM | WEIGHT: 214.6 LBS | SYSTOLIC BLOOD PRESSURE: 130 MMHG | TEMPERATURE: 98.4 F | HEIGHT: 60 IN

## 2024-10-14 DIAGNOSIS — K58.2 IRRITABLE BOWEL SYNDROME WITH MIXED BOWEL HABITS: ICD-10-CM

## 2024-10-14 DIAGNOSIS — J45.30 MILD PERSISTENT ASTHMA, UNCOMPLICATED: Primary | ICD-10-CM

## 2024-10-14 DIAGNOSIS — K57.90 DIVERTICULOSIS: ICD-10-CM

## 2024-10-14 DIAGNOSIS — Z02.89 ENCOUNTER FOR COMPLETION OF FORM WITH PATIENT: ICD-10-CM

## 2024-10-14 PROCEDURE — 99214 OFFICE O/P EST MOD 30 MIN: CPT | Performed by: INTERNAL MEDICINE

## 2024-10-14 RX ORDER — DICLOFENAC SODIUM 75 MG/1
75 TABLET, DELAYED RELEASE ORAL 2 TIMES DAILY
COMMUNITY
Start: 2024-08-16

## 2024-10-14 ASSESSMENT — PATIENT HEALTH QUESTIONNAIRE - PHQ9
SUM OF ALL RESPONSES TO PHQ QUESTIONS 1-9: 0
2. FEELING DOWN, DEPRESSED OR HOPELESS: NOT AT ALL
SUM OF ALL RESPONSES TO PHQ QUESTIONS 1-9: 0
SUM OF ALL RESPONSES TO PHQ QUESTIONS 1-9: 0
1. LITTLE INTEREST OR PLEASURE IN DOING THINGS: NOT AT ALL
SUM OF ALL RESPONSES TO PHQ9 QUESTIONS 1 & 2: 0
SUM OF ALL RESPONSES TO PHQ QUESTIONS 1-9: 0

## 2024-10-14 NOTE — PROGRESS NOTES
RM    Chief Complaint   Patient presents with    Follow-up     Pt brought her FMLA to fill out .       Vitals:    10/14/24 1217 10/14/24 1220   BP: (!) 125/91 130/86   Site: Right Upper Arm Left Upper Arm   Position: Sitting Sitting   Pulse: (!) 109    Temp: 98.4 °F (36.9 °C)    TempSrc: Oral    SpO2: 93%    Weight: 97.3 kg (214 lb 9.6 oz)    Height: 1.524 m (5')           \"Have you been to the ER, urgent care clinic since your last visit?  Hospitalized since your last visit?\"    NO    “Have you seen or consulted any other health care providers outside of [facility] since your last visit?”    NO     “Have you had a pap smear?”    NO    No cervical cancer screening on file             Click Here for Release of Records Request   AVS  education, follow up, and recommendations provided and addressed with patient.  services used to advise patient No

## 2024-10-14 NOTE — PROGRESS NOTES
Starla Condon (: 1980) is a 44 y.o. female, established patient, here for evaluation of the following chief complaint(s):  Chief Complaint   Patient presents with    Follow-up     Pt brought her FMLA to fill out .       Assessment and Plan:      Diagnosis Orders   1. Mild persistent asthma, uncomplicated  External Referral To Allergy      2. Irritable bowel syndrome with mixed bowel habits        3. Diverticulosis        4. Encounter for completion of form with patient            1:  Referral(s) and referral coordination reviewed with patient at visit.    2-3:  GI follow-up reviewed as below.    4:  Form completed/reviewed with pt at visit.      Return for referral follow-up--as scheduled.  lab results and schedule of future lab studies reviewed with patient  reviewed medications and side effects in detail    Plan and evaluation (above) reviewed with pt at visit  Patient voiced understanding of plan and provided with time to ask/review questions.  After Visit Summary (AVS) provided to pt after visit with additional instructions as needed/reviewed.      Future Appointments   Date Time Provider Department Center   2024  9:20 AM [facility]   2025  8:50 AM Francesco Yang MD RDE [place] [address] BS [facility]   --Updated future visits after patient check-out.      History of Present Illness:     Notes (nursing/rooming note copied below in italics and/or gray-shaded):  As above and in nursing note.      Here to update FMLA.  Has forms with her.  Reviewed and completed at visit.      LOV was Aug 2024 for asthma mgt/follow-up.  No meds, just updated paperwork for FMLA with recent asthma exacerbation.    Planned 2wk BP follow-up then, but this is first follow-up.      She had recent colonoscopy with Dr. Cummings/GI.  Impression:     Mild pandiverticulosis  Small ascending colon polyp removed  Otherwise normal colonoscopy     Recommendations: --Await pathology  -If adenoma is

## 2024-10-22 ENCOUNTER — OFFICE VISIT (OUTPATIENT)
Age: 44
End: 2024-10-22
Payer: MEDICAID

## 2024-10-22 VITALS
SYSTOLIC BLOOD PRESSURE: 120 MMHG | TEMPERATURE: 98.6 F | DIASTOLIC BLOOD PRESSURE: 85 MMHG | OXYGEN SATURATION: 93 % | RESPIRATION RATE: 19 BRPM | HEART RATE: 112 BPM | BODY MASS INDEX: 41.78 KG/M2 | WEIGHT: 212.8 LBS | HEIGHT: 60 IN

## 2024-10-22 DIAGNOSIS — R05.1 ACUTE COUGH: Primary | ICD-10-CM

## 2024-10-22 DIAGNOSIS — J45.21 MILD INTERMITTENT ASTHMA WITH EXACERBATION: ICD-10-CM

## 2024-10-22 DIAGNOSIS — E05.90 THYROTOXICOSIS, UNSPECIFIED WITHOUT THYROTOXIC CRISIS OR STORM: ICD-10-CM

## 2024-10-22 DIAGNOSIS — J45.30 MILD PERSISTENT ASTHMA, UNCOMPLICATED: ICD-10-CM

## 2024-10-22 LAB
EXP DATE SOLUTION: NORMAL
EXP DATE SWAB: NORMAL
EXPIRATION DATE: NORMAL
INFLUENZA A ANTIGEN, POC: NEGATIVE
INFLUENZA B ANTIGEN, POC: NEGATIVE
LOT NUMBER POC: NORMAL
LOT NUMBER SOLUTION: NORMAL
LOT NUMBER SWAB: NORMAL
SARS-COV-2 RNA, POC: NEGATIVE
VALID INTERNAL CONTROL, POC: YES

## 2024-10-22 PROCEDURE — 87635 SARS-COV-2 COVID-19 AMP PRB: CPT | Performed by: INTERNAL MEDICINE

## 2024-10-22 PROCEDURE — 87502 INFLUENZA DNA AMP PROBE: CPT | Performed by: INTERNAL MEDICINE

## 2024-10-22 PROCEDURE — 99214 OFFICE O/P EST MOD 30 MIN: CPT | Performed by: INTERNAL MEDICINE

## 2024-10-22 RX ORDER — PREDNISONE 20 MG/1
60 TABLET ORAL DAILY
Qty: 18 TABLET | Refills: 0 | Status: SHIPPED | OUTPATIENT
Start: 2024-10-22 | End: 2024-10-28

## 2024-10-22 RX ORDER — ALBUTEROL SULFATE 0.83 MG/ML
2.5 SOLUTION RESPIRATORY (INHALATION) 4 TIMES DAILY PRN
Qty: 25 EACH | Refills: 2 | Status: SHIPPED | OUTPATIENT
Start: 2024-10-22

## 2024-10-22 RX ORDER — BENZONATATE 200 MG/1
200 CAPSULE ORAL 3 TIMES DAILY PRN
Qty: 30 CAPSULE | Refills: 0 | Status: SHIPPED | OUTPATIENT
Start: 2024-10-22

## 2024-10-22 SDOH — ECONOMIC STABILITY: FOOD INSECURITY: WITHIN THE PAST 12 MONTHS, THE FOOD YOU BOUGHT JUST DIDN'T LAST AND YOU DIDN'T HAVE MONEY TO GET MORE.: NEVER TRUE

## 2024-10-22 SDOH — ECONOMIC STABILITY: INCOME INSECURITY: HOW HARD IS IT FOR YOU TO PAY FOR THE VERY BASICS LIKE FOOD, HOUSING, MEDICAL CARE, AND HEATING?: NOT HARD AT ALL

## 2024-10-22 SDOH — ECONOMIC STABILITY: FOOD INSECURITY: WITHIN THE PAST 12 MONTHS, YOU WORRIED THAT YOUR FOOD WOULD RUN OUT BEFORE YOU GOT MONEY TO BUY MORE.: NEVER TRUE

## 2024-10-22 ASSESSMENT — PATIENT HEALTH QUESTIONNAIRE - PHQ9
1. LITTLE INTEREST OR PLEASURE IN DOING THINGS: NOT AT ALL
SUM OF ALL RESPONSES TO PHQ QUESTIONS 1-9: 0
SUM OF ALL RESPONSES TO PHQ9 QUESTIONS 1 & 2: 0
2. FEELING DOWN, DEPRESSED OR HOPELESS: NOT AT ALL
SUM OF ALL RESPONSES TO PHQ QUESTIONS 1-9: 0

## 2024-10-22 NOTE — TELEPHONE ENCOUNTER
Last appointment: 10/22/2024 MD Bliss   Next appointment: 12/09/2024 MD Bliss   Previous refill encounter(s):   06/06/2024 Albuterol HFA INH #8 grams with 2 refills. Pharmacy is requesting 18 grams.     For Pharmacy Admin Tracking Only    Program: Medication Refill  Intervention Detail: New Rx: 1, reason: Patient Preference  Time Spent (min): 5    Requested Prescriptions     Pending Prescriptions Disp Refills    albuterol sulfate HFA (PROVENTIL;VENTOLIN;PROAIR) 108 (90 Base) MCG/ACT inhaler [Pharmacy Med Name: ALBUTEROL HFA (VENTOLIN) INH] 18 each 0     Sig: INHALE 2 PUFFS BY MOUTH EVERY 4 HOURS AS NEEDED FOR WHEEZE

## 2024-10-22 NOTE — PATIENT INSTRUCTIONS
Results for orders placed or performed in visit on 10/22/24   AMB POC COVID-19 COV   Result Value Ref Range    SARS-COV-2 RNA, POC Negative     Lot number swab      EXP date swab      Lot number solution      EXP date solution      LOT NUMBER POC      EXPIRATION DATE     AMB POC INFLUENZA A  AND B REAL-TIME RT-PCR   Result Value Ref Range    Valid Internal Control, POC YES     Influenza A Antigen, POC Negative Not Detected    Influenza B Antigen, POC Negative Not Detected       IDNow device used for POC testing above.  Since this is an isothermal PCR/molecular test, no send-out testing performed.

## 2024-10-22 NOTE — PROGRESS NOTES
RM: 17  Chief Complaint   Patient presents with    Headache    Cough    Congestion      Vitals:    10/22/24 1558   BP: (!) 144/93   Pulse: (!) 114   Resp: 19   Temp: 98.6 °F (37 °C)   SpO2: 93%      FASTING: No  Have you been to the ER, urgent care clinic since your last visit?  Hospitalized since your last visit?\"    NO  “Have you seen or consulted any other health care providers outside of [facility] since your last visit?”    NO    Click Here for Release of Records Request

## 2024-10-22 NOTE — PROGRESS NOTES
Starla Condon (: 1980) is a 44 y.o. female, established patient, here for evaluation of the following chief complaint(s):  Chief Complaint   Patient presents with    Headache    Cough    Congestion       Assessment and Plan:      Diagnosis Orders   1. Acute cough  AMB POC COVID-19 COV    AMB POC INFLUENZA A  AND B REAL-TIME RT-PCR    benzonatate (TESSALON) 200 MG capsule    Nebulizers (COMPRESSOR/NEBULIZER) MISC    albuterol (PROVENTIL) (2.5 MG/3ML) 0.083% nebulizer solution    predniSONE (DELTASONE) 20 MG tablet      2. Mild intermittent asthma with exacerbation  benzonatate (TESSALON) 200 MG capsule    Nebulizers (COMPRESSOR/NEBULIZER) MISC    albuterol (PROVENTIL) (2.5 MG/3ML) 0.083% nebulizer solution    predniSONE (DELTASONE) 20 MG tablet            1-2:  Medication(s), management and follow-up based on response reviewed at visit.  Symptomatic management reviewed at visit.  Reviewed typical course of illness, duration of symptoms, and exam findings.    Testing, exam findings and mgt reviewed.  Plan 6 days prednisone so will complete on , not Monday (seen on Tuesday).      Requested Prescriptions     Signed Prescriptions Disp Refills    benzonatate (TESSALON) 200 MG capsule 30 capsule 0     Sig: Take 1 capsule by mouth 3 times daily as needed for Cough Use instead of Mucinex as directed.    Nebulizers (COMPRESSOR/NEBULIZER) MISC 1 each 3     Sig: Use for albuterol nebulization as directed.    albuterol (PROVENTIL) (2.5 MG/3ML) 0.083% nebulizer solution 25 each 2     Sig: Take 3 mLs by nebulization 4 times daily as needed for Wheezing    predniSONE (DELTASONE) 20 MG tablet 18 tablet 0     Sig: Take 3 tablets by mouth daily for 6 days       Return if symptoms worsen or fail to improve.  lab results and schedule of future lab studies reviewed with patient  reviewed medications and side effects in detail    Plan and evaluation (above) reviewed with pt at visit  Patient voiced understanding of

## 2024-10-23 ENCOUNTER — TELEPHONE (OUTPATIENT)
Age: 44
End: 2024-10-23

## 2024-10-23 RX ORDER — METHIMAZOLE 5 MG/1
TABLET ORAL
Qty: 30 TABLET | Refills: 1 | Status: SHIPPED | OUTPATIENT
Start: 2024-10-23

## 2024-10-26 RX ORDER — ALBUTEROL SULFATE 90 UG/1
INHALANT RESPIRATORY (INHALATION)
Qty: 8 EACH | Refills: 2 | Status: SHIPPED | OUTPATIENT
Start: 2024-10-26

## 2024-10-26 NOTE — TELEPHONE ENCOUNTER
Refill request(s) approved--albuterol inhaler--as prior.    Refill protocol details (computer-generated) reviewed, as available.    Requested Prescriptions     Signed Prescriptions Disp Refills    albuterol sulfate HFA (PROVENTIL;VENTOLIN;PROAIR) 108 (90 Base) MCG/ACT inhaler 8 each 2     Sig: INHALE 2 PUFFS BY MOUTH EVERY 4 HOURS AS NEEDED FOR WHEEZE     Authorizing Provider: ALISSA CARR

## 2024-12-01 DIAGNOSIS — E66.813 CLASS 3 SEVERE OBESITY WITH SERIOUS COMORBIDITY AND BODY MASS INDEX (BMI) OF 40.0 TO 44.9 IN ADULT, UNSPECIFIED OBESITY TYPE: ICD-10-CM

## 2024-12-01 DIAGNOSIS — B37.2 INTERTRIGINOUS CANDIDIASIS: ICD-10-CM

## 2024-12-01 DIAGNOSIS — E66.01 CLASS 3 SEVERE OBESITY WITH SERIOUS COMORBIDITY AND BODY MASS INDEX (BMI) OF 40.0 TO 44.9 IN ADULT, UNSPECIFIED OBESITY TYPE: ICD-10-CM

## 2024-12-01 DIAGNOSIS — E05.90 HYPERTHYROIDISM: ICD-10-CM

## 2024-12-02 NOTE — TELEPHONE ENCOUNTER
Last appointment: 10/22/2024 MD Bliss   Next appointment: 12/09/2024 MD Bliss   Previous refill encounter(s):   06/06/2024 Nystatin #60 grams with 5 refills.     For Pharmacy Admin Tracking Only    Program: Medication Refill  Intervention Detail: New Rx: 1, reason: Patient Preference  Time Spent (min): 5    Requested Prescriptions     Pending Prescriptions Disp Refills    nystatin (MYCOSTATIN) 387608 UNIT/GM ointment [Pharmacy Med Name: NYSTATIN 100,000 UNIT/GM OINT] 60 g 0     Sig: APPLY TOPICALLY 2-3 TIMES DAILY AS NEEDED TO RASH ON ABDOMEN/UNDER SKIN FOLDS AS DIRECTED.

## 2024-12-09 ENCOUNTER — OFFICE VISIT (OUTPATIENT)
Age: 44
End: 2024-12-09
Payer: MEDICAID

## 2024-12-09 VITALS
BODY MASS INDEX: 42.25 KG/M2 | WEIGHT: 215.2 LBS | DIASTOLIC BLOOD PRESSURE: 84 MMHG | HEART RATE: 91 BPM | RESPIRATION RATE: 19 BRPM | HEIGHT: 60 IN | SYSTOLIC BLOOD PRESSURE: 138 MMHG | TEMPERATURE: 98 F | OXYGEN SATURATION: 97 %

## 2024-12-09 DIAGNOSIS — J45.30 MILD PERSISTENT ASTHMA WITHOUT COMPLICATION: ICD-10-CM

## 2024-12-09 DIAGNOSIS — H93.13 TINNITUS OF BOTH EARS: ICD-10-CM

## 2024-12-09 DIAGNOSIS — Z78.9 HEPATITIS B IMMUNE: ICD-10-CM

## 2024-12-09 DIAGNOSIS — Z11.3 ROUTINE SCREENING FOR STI (SEXUALLY TRANSMITTED INFECTION): ICD-10-CM

## 2024-12-09 DIAGNOSIS — E05.90 HYPERTHYROIDISM: ICD-10-CM

## 2024-12-09 DIAGNOSIS — R42 DIZZINESS: ICD-10-CM

## 2024-12-09 DIAGNOSIS — B37.2 INTERTRIGINOUS CANDIDIASIS: ICD-10-CM

## 2024-12-09 DIAGNOSIS — Z00.00 WELL WOMAN EXAM (NO GYNECOLOGICAL EXAM): Primary | ICD-10-CM

## 2024-12-09 PROCEDURE — 99396 PREV VISIT EST AGE 40-64: CPT | Performed by: INTERNAL MEDICINE

## 2024-12-09 PROCEDURE — 99214 OFFICE O/P EST MOD 30 MIN: CPT | Performed by: INTERNAL MEDICINE

## 2024-12-09 RX ORDER — BENZONATATE 200 MG/1
200 CAPSULE ORAL 3 TIMES DAILY PRN
Qty: 30 CAPSULE | Refills: 0 | Status: SHIPPED | OUTPATIENT
Start: 2024-12-09

## 2024-12-09 RX ORDER — NYSTATIN 100000 U/G
OINTMENT TOPICAL
Qty: 60 G | Refills: 5 | Status: SHIPPED | OUTPATIENT
Start: 2024-12-09

## 2024-12-09 RX ORDER — NYSTATIN 100000 U/G
OINTMENT TOPICAL
Qty: 60 G | Refills: 0 | OUTPATIENT
Start: 2024-12-09

## 2024-12-09 SDOH — ECONOMIC STABILITY: FOOD INSECURITY: WITHIN THE PAST 12 MONTHS, THE FOOD YOU BOUGHT JUST DIDN'T LAST AND YOU DIDN'T HAVE MONEY TO GET MORE.: NEVER TRUE

## 2024-12-09 SDOH — ECONOMIC STABILITY: INCOME INSECURITY: HOW HARD IS IT FOR YOU TO PAY FOR THE VERY BASICS LIKE FOOD, HOUSING, MEDICAL CARE, AND HEATING?: NOT HARD AT ALL

## 2024-12-09 SDOH — ECONOMIC STABILITY: FOOD INSECURITY: WITHIN THE PAST 12 MONTHS, YOU WORRIED THAT YOUR FOOD WOULD RUN OUT BEFORE YOU GOT MONEY TO BUY MORE.: NEVER TRUE

## 2024-12-09 ASSESSMENT — PATIENT HEALTH QUESTIONNAIRE - PHQ9
1. LITTLE INTEREST OR PLEASURE IN DOING THINGS: NOT AT ALL
SUM OF ALL RESPONSES TO PHQ QUESTIONS 1-9: 0
SUM OF ALL RESPONSES TO PHQ QUESTIONS 1-9: 0
SUM OF ALL RESPONSES TO PHQ9 QUESTIONS 1 & 2: 0
SUM OF ALL RESPONSES TO PHQ QUESTIONS 1-9: 0
2. FEELING DOWN, DEPRESSED OR HOPELESS: NOT AT ALL
SUM OF ALL RESPONSES TO PHQ QUESTIONS 1-9: 0

## 2024-12-09 NOTE — PROGRESS NOTES
RM: 16  Chief Complaint   Patient presents with    Annual Exam      Vitals:    12/09/24 0928   BP: (!) 142/84   Pulse: 91   Resp: 19   Temp: 98 °F (36.7 °C)   SpO2: 97%      FASTING: Yes  Have you been to the ER, urgent care clinic since your last visit?  Hospitalized since your last visit?\"    NO  “Have you seen or consulted any other health care providers outside of [facility] since your last visit?”    NO    Click Here for Release of Records Request     No vaccines today

## 2024-12-09 NOTE — PROGRESS NOTES
Starla Condon (: 1980) is a 44 y.o. female, established patient, here for evaluation of the following chief complaint(s):  Chief Complaint   Patient presents with    Annual Exam       Assessment and Plan:      Diagnosis Orders   1. Well woman exam (no gynecological exam)  CBC with Auto Differential    Comprehensive Metabolic Panel    Lipid Panel    Hemoglobin A1C    T4, Free    TSH    [facility] Denae Maurer RDN, Nutrition [facility]      2. Routine screening for STI (sexually transmitted infection)  Chlamydia, Gonorrhea, Trichomoniasis    Hepatitis C Antibody    T Pallidum Screen W/Reflex    HIV 1/2 Ag/Ab, 4TH Generation,W Rflx Confirm      3. Hepatitis B immune        4. Mild persistent asthma without complication  benzonatate (TESSALON) 200 MG capsule      5. Intertriginous candidiasis  nystatin (MYCOSTATIN) 550391 UNIT/GM ointment      6. Hyperthyroidism  T4, Free    TSH    T3    [facility]      7. BMI 40.0-44.9, adult  Denae Hernandez RDN, Nutrition [facility]      8. Dizziness  External Referral To ENT      9. Tinnitus of both ears  External Referral To ENT          Diagnoses #1-3 for Preventive Care/Wellness visit.    Due to significant separate problems unrelated to preventive care needs, also addressed following diagnoses/problems at visit as below (diagnoses #4-9 above).    1-3:  Screenings reviewed at visit.  Prefers to do labs here ordered by Dr. Yang--done at visit today.  Prefers to update screenings for STI as reviewed above.  May have done with gyn also.    4-5:  Refill(s) and management reviewed.  Continue current medication--both PRN.    6:  Thyroid labs re-ordered to do here with labs for Dr. Yang.  She will message him or review at follow-up as needed.    6-7:  Referral(s) and referral coordination reviewed with patient at visit.    8-9:  Referral(s) and referral coordination reviewed with patient

## 2024-12-10 LAB
ALBUMIN SERPL-MCNC: 4.6 G/DL (ref 3.9–4.9)
ALP SERPL-CCNC: 70 IU/L (ref 44–121)
ALT SERPL-CCNC: 12 IU/L (ref 0–32)
AST SERPL-CCNC: 14 IU/L (ref 0–40)
BASOPHILS # BLD AUTO: 0.1 X10E3/UL (ref 0–0.2)
BASOPHILS NFR BLD AUTO: 1 %
BILIRUB SERPL-MCNC: 0.4 MG/DL (ref 0–1.2)
BUN SERPL-MCNC: 10 MG/DL (ref 6–24)
BUN/CREAT SERPL: 11 (ref 9–23)
CALCIUM SERPL-MCNC: 9.5 MG/DL (ref 8.7–10.2)
CHLORIDE SERPL-SCNC: 100 MMOL/L (ref 96–106)
CHOLEST SERPL-MCNC: 159 MG/DL (ref 100–199)
CO2 SERPL-SCNC: 24 MMOL/L (ref 20–29)
CREAT SERPL-MCNC: 0.87 MG/DL (ref 0.57–1)
EOSINOPHIL # BLD AUTO: 0.2 X10E3/UL (ref 0–0.4)
EOSINOPHIL NFR BLD AUTO: 2 %
ERYTHROCYTE [DISTWIDTH] IN BLOOD BY AUTOMATED COUNT: 13.4 % (ref 11.7–15.4)
GLOBULIN SER CALC-MCNC: 3 G/DL (ref 1.5–4.5)
GLUCOSE SERPL-MCNC: 100 MG/DL (ref 70–99)
HBA1C MFR BLD: 6.4 % (ref 4.8–5.6)
HCT VFR BLD AUTO: 39 % (ref 34–46.6)
HCV IGG SERPL QL IA: NON REACTIVE
HDLC SERPL-MCNC: 56 MG/DL
HGB BLD-MCNC: 12.3 G/DL (ref 11.1–15.9)
HIV 1+2 AB+HIV1 P24 AG SERPL QL IA: NON REACTIVE
IMM GRANULOCYTES # BLD AUTO: 0 X10E3/UL (ref 0–0.1)
IMM GRANULOCYTES NFR BLD AUTO: 0 %
LDLC SERPL CALC-MCNC: 92 MG/DL (ref 0–99)
LYMPHOCYTES # BLD AUTO: 2.7 X10E3/UL (ref 0.7–3.1)
LYMPHOCYTES NFR BLD AUTO: 33 %
MCH RBC QN AUTO: 28.1 PG (ref 26.6–33)
MCHC RBC AUTO-ENTMCNC: 31.5 G/DL (ref 31.5–35.7)
MCV RBC AUTO: 89 FL (ref 79–97)
MONOCYTES # BLD AUTO: 0.4 X10E3/UL (ref 0.1–0.9)
MONOCYTES NFR BLD AUTO: 5 %
NEUTROPHILS # BLD AUTO: 4.8 X10E3/UL (ref 1.4–7)
NEUTROPHILS NFR BLD AUTO: 59 %
PLATELET # BLD AUTO: 457 X10E3/UL (ref 150–450)
POTASSIUM SERPL-SCNC: 4.5 MMOL/L (ref 3.5–5.2)
PROT SERPL-MCNC: 7.6 G/DL (ref 6–8.5)
RBC # BLD AUTO: 4.37 X10E6/UL (ref 3.77–5.28)
SODIUM SERPL-SCNC: 140 MMOL/L (ref 134–144)
T3 SERPL-MCNC: 108 NG/DL (ref 71–180)
T4 FREE SERPL-MCNC: 1.54 NG/DL (ref 0.82–1.77)
TRIGL SERPL-MCNC: 54 MG/DL (ref 0–149)
TSH SERPL DL<=0.005 MIU/L-ACNC: 1.01 UIU/ML (ref 0.45–4.5)
VLDLC SERPL CALC-MCNC: 11 MG/DL (ref 5–40)
WBC # BLD AUTO: 8.2 X10E3/UL (ref 3.4–10.8)

## 2024-12-11 LAB
C TRACH RRNA SPEC QL NAA+PROBE: NEGATIVE
N GONORRHOEA RRNA SPEC QL NAA+PROBE: NEGATIVE
T VAGINALIS RRNA SPEC QL NAA+PROBE: NEGATIVE
TREPONEMA PALLIDUM IGG+IGM AB [PRESENCE] IN SERUM OR PLASMA BY IMMUNOASSAY: NON REACTIVE

## 2025-01-07 ENCOUNTER — TELEMEDICINE (OUTPATIENT)
Age: 45
End: 2025-01-07
Payer: MEDICAID

## 2025-01-07 DIAGNOSIS — E66.01 CLASS 3 SEVERE OBESITY WITH SERIOUS COMORBIDITY AND BODY MASS INDEX (BMI) OF 40.0 TO 44.9 IN ADULT, UNSPECIFIED OBESITY TYPE: ICD-10-CM

## 2025-01-07 DIAGNOSIS — E05.90 HYPERTHYROIDISM: Primary | ICD-10-CM

## 2025-01-07 DIAGNOSIS — E66.813 CLASS 3 SEVERE OBESITY WITH SERIOUS COMORBIDITY AND BODY MASS INDEX (BMI) OF 40.0 TO 44.9 IN ADULT, UNSPECIFIED OBESITY TYPE: ICD-10-CM

## 2025-01-07 PROCEDURE — 99214 OFFICE O/P EST MOD 30 MIN: CPT | Performed by: INTERNAL MEDICINE

## 2025-01-07 PROCEDURE — G2211 COMPLEX E/M VISIT ADD ON: HCPCS | Performed by: INTERNAL MEDICINE

## 2025-01-07 NOTE — PROGRESS NOTES
No chief complaint on file.           **THIS IS A VIRTUAL VISIT VIA A VIDEO ENCOUNTER.  PATIENT AGREED TO HAVE THEIR CARE DELIVERED OVER VIDEO IN PLACE OF THEIR REGULARLY SCHEDULED OFFICE VISIT**         Starla Condon, was evaluated through a synchronous (real-time) audio-video encounter. The patient (or guardian if applicable) is aware that this is a billable service, which includes applicable co-pays. This Virtual Visit was conducted with patient's (and/or legal guardian's) consent. The visit was conducted pursuant to the emergency declaration under the Court[place] Act and the National Emergencies Act, 1135 waiver authority and the Coronavirus Preparedness and Response Supplemental Appropriations Act. Patient identification was verified, and a caregiver was present when appropriate. The patient was located in a state where the provider was licensed to provide care.    History of Present Illness: Starla Condon is a 44 y.o. female here for follow up of hyperthyroidism and obesity.    At our last visit in July 2024 she was clinically and biochemically euthyroid on the MMI 2.5mg daily. Pt was instructed to continue the MMI 2.5mg buyt to take it every other day.     \"I had a colonoscopy and I was found to have diverticulosis and that is why the Wegovy was not working for me, so I had to stop the Wegovy.\"    She has tried calorie reduction diets, she has tried exercise regimens. She has spoken with a specialist about diet changes and yet she has been unable to have a significant amount of weight loss.  She has been intolerant of Phentermine and Topiramate.  Her insurance refused Saxenda and she was not able to tolerate Wegovy.    Pt is still taking the MMI 2.5mg every other day.  Pt notes she is still taking the spironolactone 50mg once daily. \"I went to the dermatologist for my acne and they put me on that.\"  She does feel that it has helped with the acne.    She has not needed any Atenolol, she has not

## 2025-01-09 ENCOUNTER — TELEPHONE (OUTPATIENT)
Age: 45
End: 2025-01-09

## 2025-01-09 NOTE — TELEPHONE ENCOUNTER
Called patient regarding referral to our [facility]. Patient did not answer so I left a vmail with our contact information.

## 2025-01-10 ENCOUNTER — TELEPHONE (OUTPATIENT)
Age: 45
End: 2025-01-10

## 2025-01-10 NOTE — TELEPHONE ENCOUNTER
Sent medical weight loss orientation via e-mail; Patient will view recording, contact insurance to verify coverage and send an email to the  for next steps.

## 2025-01-24 ENCOUNTER — TELEPHONE (OUTPATIENT)
Age: 45
End: 2025-01-24

## 2025-01-24 NOTE — TELEPHONE ENCOUNTER
Called patient regarding referral to our [facility]. Patient did not answer so I left a vmail with our contact information.

## 2025-02-18 DIAGNOSIS — E05.90 HYPERTHYROIDISM: ICD-10-CM

## 2025-02-20 SDOH — ECONOMIC STABILITY: FOOD INSECURITY: WITHIN THE PAST 12 MONTHS, YOU WORRIED THAT YOUR FOOD WOULD RUN OUT BEFORE YOU GOT MONEY TO BUY MORE.: PATIENT DECLINED

## 2025-02-20 SDOH — ECONOMIC STABILITY: INCOME INSECURITY: IN THE LAST 12 MONTHS, WAS THERE A TIME WHEN YOU WERE NOT ABLE TO PAY THE MORTGAGE OR RENT ON TIME?: NO

## 2025-02-20 SDOH — ECONOMIC STABILITY: TRANSPORTATION INSECURITY
IN THE PAST 12 MONTHS, HAS LACK OF TRANSPORTATION KEPT YOU FROM MEETINGS, WORK, OR FROM GETTING THINGS NEEDED FOR DAILY LIVING?: NO

## 2025-02-20 SDOH — ECONOMIC STABILITY: FOOD INSECURITY: WITHIN THE PAST 12 MONTHS, THE FOOD YOU BOUGHT JUST DIDN'T LAST AND YOU DIDN'T HAVE MONEY TO GET MORE.: PATIENT DECLINED

## 2025-02-20 SDOH — ECONOMIC STABILITY: TRANSPORTATION INSECURITY
IN THE PAST 12 MONTHS, HAS THE LACK OF TRANSPORTATION KEPT YOU FROM MEDICAL APPOINTMENTS OR FROM GETTING MEDICATIONS?: NO

## 2025-02-21 ENCOUNTER — TELEMEDICINE (OUTPATIENT)
Age: 45
End: 2025-02-21

## 2025-02-21 DIAGNOSIS — J45.30 MILD PERSISTENT ASTHMA WITHOUT COMPLICATION: Primary | ICD-10-CM

## 2025-02-21 DIAGNOSIS — J30.89 PERENNIAL ALLERGIC RHINITIS: ICD-10-CM

## 2025-02-21 DIAGNOSIS — Z02.89 ENCOUNTER FOR COMPLETION OF FORM WITH PATIENT: ICD-10-CM

## 2025-02-21 DIAGNOSIS — Z29.89 IMMUNOTHERAPY: ICD-10-CM

## 2025-02-21 RX ORDER — FLUTICASONE PROPIONATE 50 MCG
SPRAY, SUSPENSION (ML) NASAL
COMMUNITY
Start: 2025-01-25

## 2025-02-21 RX ORDER — SULFAMETHOXAZOLE AND TRIMETHOPRIM 800; 160 MG/1; MG/1
TABLET ORAL
COMMUNITY
Start: 2025-02-17

## 2025-02-21 RX ORDER — BUSPIRONE HYDROCHLORIDE 7.5 MG/1
TABLET ORAL
COMMUNITY
Start: 2025-01-25

## 2025-02-21 RX ORDER — LEVALBUTEROL TARTRATE 45 UG/1
AEROSOL, METERED ORAL
COMMUNITY
Start: 2025-01-25

## 2025-02-21 RX ORDER — BUDESONIDE AND FORMOTEROL FUMARATE DIHYDRATE 160; 4.5 UG/1; UG/1
AEROSOL RESPIRATORY (INHALATION)
COMMUNITY
Start: 2025-01-25

## 2025-02-21 RX ORDER — MINOCYCLINE HYDROCHLORIDE 100 MG/1
CAPSULE ORAL
COMMUNITY
Start: 2025-01-25

## 2025-02-21 RX ORDER — TRANEXAMIC ACID 650 MG/1
TABLET ORAL
COMMUNITY
Start: 2024-12-01

## 2025-02-21 NOTE — PROGRESS NOTES
vIRTUAL Visit     Chief Complaint   Patient presents with    Follow-up     Discuss FMLA forms       There were no vitals filed for this visit.          No data to display                \"Have you been to the ER, urgent care clinic since your last visit?  Hospitalized since your last visit?\"    NO    “Have you seen or consulted any other health care providers outside of [facility] since your last visit?”    NO            Click Here for Release of Records Request   AVS  education, follow up, and recommendations provided and addressed with patient.  services used to advise patient No

## 2025-02-21 NOTE — PROGRESS NOTES
2025    TELEHEALTH EVALUATION -- Audio/Visual    ASSESSMENT/PLAN:   Diagnosis Orders   1. Mild persistent asthma without complication        2. Perennial allergic rhinitis        3. Immunotherapy--planning to start with allergist next week.        4. Encounter for completion of form with patient            1-4:  Mgt and effects on job related to FMLA form and medical mgt and exacerbations reviewed.    Form completed as reviewed at visit.  Faxed after visit.  Requested copy of faxed document to her in [employer]--to be done after visit.      Return if symptoms worsen or fail to improve.    Plan and evaluation (above) reviewed with pt at visit  Patient voiced understanding of plan and provided with time to ask/review questions.  After Visit Summary (AVS) provided to pt after visit with additional instructions as needed/reviewed.      AVS:  [x]  Available to patient in MyChart after visit signed.  []  Mailed to patient after visit.  []  Not sent to patient after visit.      Future Appointments   Date Time Provider Department Center   2025 11:10 AM Francesco Yang MD RDE [facility]   --Updated future visits after patient check-out.      On this date 2025 I have spent 36 minutes reviewing previous notes, test results and face to face with the patient discussing the diagnosis and importance of compliance with the treatment plan as well as documenting on the day of the visit.      HPI:    Starla Condon (:  1980) has requested an audio/video evaluation for the following concern(s):  Chief Complaint   Patient presents with    Follow-up     Discuss FMLA forms       FMLA for asthma.  Yearly update of forms, but neither of us could find last year's forms.    She notes startign allergy shots next week--delayed due to weather this week.    She is able to be out for up to 5 days/episode up to 2 times per 2 weeks.  Allowed time for episode mgt and allergy inejctions reviewed and

## 2025-02-24 ENCOUNTER — TELEPHONE (OUTPATIENT)
Age: 45
End: 2025-02-24

## 2025-04-14 DIAGNOSIS — E05.90 HYPERTHYROIDISM: Primary | ICD-10-CM

## 2025-04-14 DIAGNOSIS — I10 PRIMARY HYPERTENSION: ICD-10-CM

## 2025-04-15 ENCOUNTER — TELEMEDICINE (OUTPATIENT)
Age: 45
End: 2025-04-15
Payer: MEDICAID

## 2025-04-15 DIAGNOSIS — Z02.89 ENCOUNTER FOR COMPLETION OF FORM WITH PATIENT: ICD-10-CM

## 2025-04-15 DIAGNOSIS — J45.30 MILD PERSISTENT ASTHMA WITHOUT COMPLICATION: Primary | ICD-10-CM

## 2025-04-15 DIAGNOSIS — Z29.89 IMMUNOTHERAPY: ICD-10-CM

## 2025-04-15 DIAGNOSIS — J30.89 PERENNIAL ALLERGIC RHINITIS: ICD-10-CM

## 2025-04-15 PROCEDURE — 99212 OFFICE O/P EST SF 10 MIN: CPT | Performed by: INTERNAL MEDICINE

## 2025-04-15 SDOH — ECONOMIC STABILITY: FOOD INSECURITY: WITHIN THE PAST 12 MONTHS, THE FOOD YOU BOUGHT JUST DIDN'T LAST AND YOU DIDN'T HAVE MONEY TO GET MORE.: NEVER TRUE

## 2025-04-15 SDOH — ECONOMIC STABILITY: FOOD INSECURITY: WITHIN THE PAST 12 MONTHS, YOU WORRIED THAT YOUR FOOD WOULD RUN OUT BEFORE YOU GOT MONEY TO BUY MORE.: NEVER TRUE

## 2025-04-15 ASSESSMENT — PATIENT HEALTH QUESTIONNAIRE - PHQ9
SUM OF ALL RESPONSES TO PHQ QUESTIONS 1-9: 0
2. FEELING DOWN, DEPRESSED OR HOPELESS: NOT AT ALL
1. LITTLE INTEREST OR PLEASURE IN DOING THINGS: NOT AT ALL
SUM OF ALL RESPONSES TO PHQ QUESTIONS 1-9: 0

## 2025-04-15 NOTE — PROGRESS NOTES
RM: VV  Chief Complaint   Patient presents with    FMLA Paperwork       There were no vitals filed for this visit.   FASTING: No  Have you been to the ER, urgent care clinic since your last visit?  Hospitalized since your last visit?\"    NO  “Have you seen or consulted any other health care providers outside of [facility] since your last visit?”    NO    Click Here for Release of Records Request   
MCG/ACT AERO INHALE 2 PUFFS TWICE A DAY RINSE MOUTH AFTER USE Yes Disha Mary MD   busPIRone (BUSPAR) 7.5 MG tablet TAKE 1-2 TABLETS BY MOUTH NIGHTLY AS NEEDED (ANXIETY, SLEEP) Yes Disha Mary MD   fluticasone (FLONASE) 50 MCG/ACT nasal spray SPRAY TWO SPRAYS IN EACH NOSTRIL ONCE DAILY Yes Disha Mary MD   levalbuterol (XOPENEX HFA) 45 MCG/ACT inhaler INHALE 2 PUFFS BY MOUTH EVERY 4 TO 6 HOURS AS NEEDED WHEEZING/COUGH/SHORTNESS OF BREATH. Yes Disha Mary MD   minocycline (MINOCIN;DYNACIN) 100 MG capsule 1 CAPSULE DAILY WITH FOOD, DISCUSSED POTENTIAL FOR INCREASED PHOTOSENSITIVITY AND GI UPSET. Yes Disha Mary MD   sulfamethoxazole-trimethoprim (BACTRIM DS;SEPTRA DS) 800-160 MG per tablet TAKE 1 TABLET BY MOUTH TWICE A DAY WITH A MEAL Yes Disha Mary MD   tranexamic acid (LYSTEDA) 650 MG TABS tablet TAKE 2 TABLETS BY MOUTH 3 TIMES A DAY DURING MENSES FOR 5 DAYS Yes Disha Mary MD   benzonatate (TESSALON) 200 MG capsule Take 1 capsule by mouth 3 times daily as needed for Cough Use instead of Mucinex as directed. Yes Gerson Bliss MD   nystatin (MYCOSTATIN) 119810 UNIT/GM ointment Apply topically 2-3 times daily as needed to rash on abdomen/under skin folds as directed. Yes Gerson Bliss MD   albuterol sulfate HFA (PROVENTIL;VENTOLIN;PROAIR) 108 (90 Base) MCG/ACT inhaler INHALE 2 PUFFS BY MOUTH EVERY 4 HOURS AS NEEDED FOR WHEEZE Yes Gerson Bliss MD   methIMAzole (TAPAZOLE) 5 MG tablet Take 1/2 A tablet, every other day Yes Francesco Yang MD   Nebulizers (COMPRESSOR/NEBULIZER) MISC Use for albuterol nebulization as directed. Yes Gerson Bliss MD   albuterol (PROVENTIL) (2.5 MG/3ML) 0.083% nebulizer solution Take 3 mLs by nebulization 4 times daily as needed for Wheezing Yes Gerson Bliss MD   diclofenac (VOLTAREN) 75 MG EC tablet Take 1 tablet by mouth in the morning and at bedtime Yes Usman

## 2025-04-16 ENCOUNTER — TELEPHONE (OUTPATIENT)
Age: 45
End: 2025-04-16

## 2025-05-01 ENCOUNTER — TELEPHONE (OUTPATIENT)
Age: 45
End: 2025-05-01

## 2025-05-01 NOTE — TELEPHONE ENCOUNTER
Had been referred for Weight Management Dr. Letty Haines.  Internal Provider Feedback Loop Called 3x.

## 2025-07-08 ENCOUNTER — OFFICE VISIT (OUTPATIENT)
Age: 45
End: 2025-07-08
Payer: MEDICAID

## 2025-07-08 VITALS
DIASTOLIC BLOOD PRESSURE: 72 MMHG | HEIGHT: 60 IN | BODY MASS INDEX: 40.95 KG/M2 | WEIGHT: 208.6 LBS | SYSTOLIC BLOOD PRESSURE: 110 MMHG | HEART RATE: 98 BPM

## 2025-07-08 DIAGNOSIS — E05.90 HYPERTHYROIDISM: Primary | ICD-10-CM

## 2025-07-08 DIAGNOSIS — I10 PRIMARY HYPERTENSION: ICD-10-CM

## 2025-07-08 PROCEDURE — 99214 OFFICE O/P EST MOD 30 MIN: CPT | Performed by: INTERNAL MEDICINE

## 2025-07-08 PROCEDURE — 3078F DIAST BP <80 MM HG: CPT | Performed by: INTERNAL MEDICINE

## 2025-07-08 PROCEDURE — G2211 COMPLEX E/M VISIT ADD ON: HCPCS | Performed by: INTERNAL MEDICINE

## 2025-07-08 PROCEDURE — 3074F SYST BP LT 130 MM HG: CPT | Performed by: INTERNAL MEDICINE

## 2025-07-08 RX ORDER — TRIAMCINOLONE ACETONIDE 1 MG/G
CREAM TOPICAL
COMMUNITY

## 2025-07-08 RX ORDER — FLUOCINONIDE 0.5 MG/G
GEL TOPICAL
COMMUNITY
Start: 2025-04-24

## 2025-07-08 NOTE — PROGRESS NOTES
Chief Complaint   Patient presents with    Thyroid Problem     Pcp and pharmacy verified     History of Present Illness: Starla Condon is a 45 y.o. female here for follow up of hyperthyroidism.    At our last visit in January 2025 her TSH was 1.01 with FT4 of 1.54 and TT3 of 108. In instructed her to stop the MMI and repeat her TFTs in 6 weeks.  Pt did not have her 6 week TFTs drawn and did not have her pre-labs drawn prior to today's visit.    \"I just found out I had allergies, which was worsening my allergies. I am on allergy shots and my Asthma is doing better.\"    She is not taking the MMI (she has been off the MMI since January 2025).    Pt notes she is still taking the spironolactone 50mg once daily. \"I went to the dermatologist for my acne and they put me on that.\"  She does feel that it has helped with the acne.    She has not needed any Atenolol, she has not had any palpitations.    Her son, who has Autism, has graduated from high school.        She denies issues of palpitations, SOB, heat or cold intolerance.   She has IBS which can cause occasional irregularity.  \"I had a colonoscopy that showed diverticulitis and IBS\".  She denies issues of dysphagia or dysphonia.    She has been needing the saline eye drops. She denies erythema or eye pain.   \"I need to make a follow up appointment.\"   She saw her eye doctor in March 2024. \"I go back to see them this month.\"     Current Outpatient Medications   Medication Sig    fluocinonide (LIDEX) 0.05 % gel 1 APPLICATION TO (AFFECTED) SKIN 2 TIMES PER DAY, APPLY TO SCALP TWICE A DAY, 3 DAYS/WEEK    SYMBICORT 160-4.5 MCG/ACT AERO INHALE 2 PUFFS TWICE A DAY RINSE MOUTH AFTER USE    busPIRone (BUSPAR) 7.5 MG tablet TAKE 1-2 TABLETS BY MOUTH NIGHTLY AS NEEDED (ANXIETY, SLEEP)    fluticasone (FLONASE) 50 MCG/ACT nasal spray SPRAY TWO SPRAYS IN EACH NOSTRIL ONCE DAILY    levalbuterol (XOPENEX HFA) 45 MCG/ACT inhaler INHALE 2 PUFFS BY MOUTH EVERY 4 TO 6 HOURS AS

## 2025-07-09 LAB
T4 FREE SERPL-MCNC: 1.3 NG/DL (ref 0.8–1.5)
TSH SERPL DL<=0.05 MIU/L-ACNC: 0.58 UIU/ML (ref 0.36–3.74)

## 2025-07-10 LAB
T3 SERPL-MCNC: 109 NG/DL (ref 71–180)
TSH RECEP AB SER-ACNC: <1.1 IU/L (ref 0–1.75)

## 2025-07-13 LAB — RENIN PLAS-CCNC: 1.78 NG/ML/HR (ref 0.17–5.38)

## 2025-07-14 LAB
ALDOST SERPL-MCNC: 12.1 NG/DL (ref 0–30)
METANEPH FREE SERPL-MCNC: <25 PG/ML (ref 0–88)
NORMETANEPHRINE SERPL-MCNC: 102.8 PG/ML (ref 0–218.9)

## 2025-07-15 DIAGNOSIS — E05.90 HYPERTHYROIDISM: Primary | ICD-10-CM

## (undated) DEVICE — SET GRAV CK VLV NEEDLESS ST 3 GANGED 4WAY STPCOCK HI FLO 10

## (undated) DEVICE — SNARE ENDOSCP POLYP MED STD AD 2.4X27X240 CM 2.8 MM OVL SENS

## (undated) DEVICE — CONTAINER SPEC 20 ML LID NEUT BUFF FORMALIN 10 % POLYPR STS

## (undated) DEVICE — ELECTRODE PT RET AD L9FT HI MOIST COND ADH HYDRGEL CORDED

## (undated) DEVICE — ENDOSCOPIC KIT COMPLIANCE ENDOKIT

## (undated) DEVICE — CUFF BLD PRSS AD CLTH SGL TB W/ BAYNT CONN ROUNDED CORNER

## (undated) DEVICE — TRAP ENDOSCP POLYP 2 CHMBR DRAWER TYP

## (undated) DEVICE — TIP SUCT TRNSPAR RIB SURF STD BLB RIG NVENT W/ 5IN1 CONN DYND50138] MEDLINE INDUSTRIES INC]

## (undated) DEVICE — IV START KIT: Brand: MEDLINE